# Patient Record
Sex: FEMALE | Race: BLACK OR AFRICAN AMERICAN | Employment: UNEMPLOYED | URBAN - METROPOLITAN AREA
[De-identification: names, ages, dates, MRNs, and addresses within clinical notes are randomized per-mention and may not be internally consistent; named-entity substitution may affect disease eponyms.]

---

## 2018-11-18 ENCOUNTER — APPOINTMENT (OUTPATIENT)
Dept: CT IMAGING | Age: 48
End: 2018-11-18
Payer: COMMERCIAL

## 2018-11-18 ENCOUNTER — HOSPITAL ENCOUNTER (EMERGENCY)
Age: 48
Discharge: HOME | End: 2018-11-18
Attending: EMERGENCY MEDICINE
Payer: COMMERCIAL

## 2018-11-18 ENCOUNTER — APPOINTMENT (OUTPATIENT)
Dept: GENERAL RADIOLOGY | Age: 48
End: 2018-11-18
Payer: COMMERCIAL

## 2018-11-18 VITALS
TEMPERATURE: 98.6 F | HEART RATE: 86 BPM | BODY MASS INDEX: 37.59 KG/M2 | SYSTOLIC BLOOD PRESSURE: 171 MMHG | OXYGEN SATURATION: 96 % | WEIGHT: 240 LBS | DIASTOLIC BLOOD PRESSURE: 91 MMHG | RESPIRATION RATE: 20 BRPM

## 2018-11-18 DIAGNOSIS — S09.90XA HEAD INJURY, CLOSED, WITHOUT LOC, INITIAL ENCOUNTER: ICD-10-CM

## 2018-11-18 DIAGNOSIS — R07.9 CHEST PAIN, UNSPECIFIED TYPE: ICD-10-CM

## 2018-11-18 DIAGNOSIS — R74.01 TRANSAMINITIS: ICD-10-CM
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
## 2018-11-18 DIAGNOSIS — M79.2 RADICULAR PAIN OF LEFT UPPER EXTREMITY: ICD-10-CM

## 2018-11-18 LAB
A/G RATIO: 1.1 (ref 1.1–2.2)
ALBUMIN SERPL-MCNC: 4.1 G/DL (ref 3.4–5)
ALP BLD-CCNC: 69 U/L (ref 40–129)
ALT SERPL-CCNC: 61 U/L (ref 10–40)
ANION GAP SERPL CALCULATED.3IONS-SCNC: 17 MMOL/L (ref 3–16)
AST SERPL-CCNC: 128 U/L (ref 15–37)
BILIRUB SERPL-MCNC: <0.2 MG/DL (ref 0–1)
BUN BLDV-MCNC: 11 MG/DL (ref 7–20)
CALCIUM SERPL-MCNC: 8.7 MG/DL (ref 8.3–10.6)
CHLORIDE BLD-SCNC: 107 MMOL/L (ref 99–110)
CO2: 24 MMOL/L (ref 21–32)
CREAT SERPL-MCNC: 0.6 MG/DL (ref 0.6–1.1)
GFR AFRICAN AMERICAN: >60
GFR NON-AFRICAN AMERICAN: >60
GLOBULIN: 3.6 G/DL
GLUCOSE BLD-MCNC: 112 MG/DL (ref 70–99)
HCG QUALITATIVE: NEGATIVE
HCT VFR BLD CALC: 36 % (ref 36–48)
HEMOGLOBIN: 12.2 G/DL (ref 12–16)
MCH RBC QN AUTO: 30.8 PG (ref 26–34)
MCHC RBC AUTO-ENTMCNC: 33.9 G/DL (ref 31–36)
MCV RBC AUTO: 91 FL (ref 80–100)
PDW BLD-RTO: 22.4 % (ref 12.4–15.4)
PLATELET # BLD: 334 K/UL (ref 135–450)
PMV BLD AUTO: 8.9 FL (ref 5–10.5)
POTASSIUM SERPL-SCNC: 3.5 MMOL/L (ref 3.5–5.1)
RBC # BLD: 3.96 M/UL (ref 4–5.2)
SODIUM BLD-SCNC: 148 MMOL/L (ref 136–145)
TOTAL PROTEIN: 7.7 G/DL (ref 6.4–8.2)
TROPONIN: <0.01 NG/ML
WBC # BLD: 10.5 K/UL (ref 4–11)

## 2018-11-18 PROCEDURE — 71046 X-RAY EXAM CHEST 2 VIEWS: CPT

## 2018-11-18 PROCEDURE — 96374 THER/PROPH/DIAG INJ IV PUSH: CPT

## 2018-11-18 PROCEDURE — 70450 CT HEAD/BRAIN W/O DYE: CPT

## 2018-11-18 PROCEDURE — 72125 CT NECK SPINE W/O DYE: CPT

## 2018-11-18 PROCEDURE — 93010 ELECTROCARDIOGRAM REPORT: CPT | Performed by: INTERNAL MEDICINE

## 2018-11-18 PROCEDURE — 2580000003 HC RX 258: Performed by: EMERGENCY MEDICINE

## 2018-11-18 PROCEDURE — 6360000002 HC RX W HCPCS: Performed by: EMERGENCY MEDICINE

## 2018-11-18 PROCEDURE — 84484 ASSAY OF TROPONIN QUANT: CPT

## 2018-11-18 PROCEDURE — 96375 TX/PRO/DX INJ NEW DRUG ADDON: CPT

## 2018-11-18 PROCEDURE — 99285 EMERGENCY DEPT VISIT HI MDM: CPT

## 2018-11-18 PROCEDURE — 96361 HYDRATE IV INFUSION ADD-ON: CPT

## 2018-11-18 PROCEDURE — 84703 CHORIONIC GONADOTROPIN ASSAY: CPT

## 2018-11-18 PROCEDURE — 85027 COMPLETE CBC AUTOMATED: CPT

## 2018-11-18 PROCEDURE — 93005 ELECTROCARDIOGRAM TRACING: CPT | Performed by: EMERGENCY MEDICINE

## 2018-11-18 PROCEDURE — 80053 COMPREHEN METABOLIC PANEL: CPT

## 2018-11-18 RX ORDER — LIDOCAINE 50 MG/G
1 PATCH TOPICAL DAILY
Qty: 30 PATCH | Refills: 0 | Status: SHIPPED | OUTPATIENT
Start: 2018-11-18

## 2018-11-18 RX ORDER — CYCLOBENZAPRINE HCL 10 MG
10 TABLET ORAL 3 TIMES DAILY PRN
Qty: 30 TABLET | Refills: 0 | Status: SHIPPED | OUTPATIENT
Start: 2018-11-18 | End: 2018-11-28

## 2018-11-18 RX ORDER — 0.9 % SODIUM CHLORIDE 0.9 %
1000 INTRAVENOUS SOLUTION INTRAVENOUS ONCE
Status: COMPLETED | OUTPATIENT
Start: 2018-11-18 | End: 2018-11-18

## 2018-11-18 RX ORDER — DICLOFENAC SODIUM 75 MG/1
75 TABLET, DELAYED RELEASE ORAL 2 TIMES DAILY
Qty: 20 TABLET | Refills: 0 | Status: SHIPPED | OUTPATIENT
Start: 2018-11-18

## 2018-11-18 RX ORDER — KETOROLAC TROMETHAMINE 30 MG/ML
30 INJECTION, SOLUTION INTRAMUSCULAR; INTRAVENOUS ONCE
Status: COMPLETED | OUTPATIENT
Start: 2018-11-18 | End: 2018-11-18

## 2018-11-18 RX ORDER — LORAZEPAM 2 MG/ML
1 INJECTION INTRAMUSCULAR ONCE
Status: COMPLETED | OUTPATIENT
Start: 2018-11-18 | End: 2018-11-18

## 2018-11-18 RX ADMIN — SODIUM CHLORIDE 1000 ML: 9 INJECTION, SOLUTION INTRAVENOUS at 02:45

## 2018-11-18 RX ADMIN — KETOROLAC TROMETHAMINE 30 MG: 30 INJECTION, SOLUTION INTRAMUSCULAR at 03:49

## 2018-11-18 RX ADMIN — LORAZEPAM 1 MG: 2 INJECTION INTRAMUSCULAR; INTRAVENOUS at 03:48

## 2018-11-18 ASSESSMENT — PAIN DESCRIPTION - PAIN TYPE: TYPE: CHRONIC PAIN

## 2018-11-18 ASSESSMENT — PAIN DESCRIPTION - DESCRIPTORS: DESCRIPTORS: ACHING;CONSTANT

## 2018-11-18 ASSESSMENT — PAIN SCALES - GENERAL
PAINLEVEL_OUTOF10: 0
PAINLEVEL_OUTOF10: 8

## 2018-11-18 ASSESSMENT — PAIN DESCRIPTION - ORIENTATION: ORIENTATION: LEFT

## 2018-11-18 ASSESSMENT — PAIN DESCRIPTION - LOCATION: LOCATION: ARM

## 2018-11-18 NOTE — ED PROVIDER NOTES
Recently had [facility]AG      Social History     Social History    Marital status: Single     Spouse name: N/A    Number of children: N/A    Years of education: N/A     Occupational History    Not on file. Social History Main Topics    Smoking status: Current Every Day Smoker     Packs/day: 0.25     Years: 10.00     Types: Cigarettes     Last attempt to quit: 8/1/2009    Smokeless tobacco: Never Used      Comment: H.O. smoking 1 p.p.d x 10 yrs / Quit 8/2009    Alcohol use Yes      Comment: SOCIALLY- WEEKLY    Drug use: No    Sexual activity: Not on file     Other Topics Concern    Not on file     Social History Narrative    No narrative on file     No current facility-administered medications for this encounter. Current Outpatient Prescriptions   Medication Sig Dispense Refill    diclofenac (VOLTAREN) 75 MG EC tablet Take 1 tablet by mouth 2 times daily 20 tablet 0    cyclobenzaprine (FLEXERIL) 10 MG tablet Take 1 tablet by mouth 3 times daily as needed for Muscle spasms 30 tablet 0    lidocaine (LIDODERM) 5 % Place 1 patch onto the skin daily 12 hours on, 12 hours off. 30 patch 0    losartan (COZAAR) 50 MG tablet Take 1 tablet by mouth daily 30 tablet 3    metoprolol succinate (TOPROL XL) 200 MG extended release tablet Take 1 tablet by mouth daily 30 tablet 3    amLODIPine (NORVASC) 10 MG tablet Take 1 tablet by mouth daily 30 tablet 3    diclofenac sodium 1 % GEL Apply 2 g topically 4 times daily as needed for Pain (and swelling) 1 Tube 3    albuterol (PROVENTIL HFA) 108 (90 BASE) MCG/ACT inhaler Inhale 2 puffs into the lungs every 6 hours as needed for Wheezing 1 Inhaler 3    ibuprofen (IBU) 600 MG tablet Take 1 tablet by mouth every 8 hours as needed for Pain 90 tablet 3    mometasone (NASONEX) 50 MCG/ACT nasal spray USE 2 SPRAYS BY NASAL ROUTE DAILY 17 g 3    multivitamin (ANTIOXIDANT;PROSIGHT) TABS per tablet Take 1 tablet by mouth daily.         Elastic Bandages & Supports

## 2018-11-18 NOTE — ED NOTES
Patient walked out of room stating \"my chest hurts\". MD made aware and orders received.       Adelaida Landers RN  11/18/18 0251

## 2018-11-20 LAB
EKG ATRIAL RATE: 105 BPM
EKG DIAGNOSIS: NORMAL
EKG P AXIS: 43 DEGREES
EKG P-R INTERVAL: 144 MS
EKG Q-T INTERVAL: 332 MS
EKG QRS DURATION: 82 MS
EKG QTC CALCULATION (BAZETT): 438 MS
EKG R AXIS: 23 DEGREES
EKG T AXIS: 16 DEGREES
EKG VENTRICULAR RATE: 105 BPM

## 2020-07-25 ENCOUNTER — HOSPITAL ENCOUNTER (EMERGENCY)
Facility: CLINIC | Age: 50
Discharge: HOME | End: 2020-07-26
Attending: EMERGENCY MEDICINE | Admitting: EMERGENCY MEDICINE

## 2020-07-25 ENCOUNTER — APPOINTMENT (OUTPATIENT)
Dept: CT IMAGING | Facility: CLINIC | Age: 50
End: 2020-07-25
Attending: EMERGENCY MEDICINE

## 2020-07-25 DIAGNOSIS — S01.81XA FACIAL LACERATION, INITIAL ENCOUNTER: ICD-10-CM

## 2020-07-25 DIAGNOSIS — R55 SYNCOPE AND COLLAPSE: ICD-10-CM

## 2020-07-25 DIAGNOSIS — S16.1XXA STRAIN OF NECK MUSCLE, INITIAL ENCOUNTER: ICD-10-CM

## 2020-07-25 DIAGNOSIS — F10.920 ALCOHOLIC INTOXICATION WITHOUT COMPLICATION (H): ICD-10-CM

## 2020-07-25 LAB
ANION GAP SERPL CALCULATED.3IONS-SCNC: 10 MMOL/L (ref 3–14)
BASOPHILS # BLD AUTO: 0 10E9/L (ref 0–0.2)
BASOPHILS NFR BLD AUTO: 0.9 %
BUN SERPL-MCNC: 23 MG/DL (ref 7–30)
CALCIUM SERPL-MCNC: 8.5 MG/DL (ref 8.5–10.1)
CHLORIDE SERPL-SCNC: 113 MMOL/L (ref 94–109)
CO2 SERPL-SCNC: 22 MMOL/L (ref 20–32)
CREAT SERPL-MCNC: 0.79 MG/DL (ref 0.52–1.04)
DIFFERENTIAL METHOD BLD: ABNORMAL
EOSINOPHIL # BLD AUTO: 0.1 10E9/L (ref 0–0.7)
EOSINOPHIL NFR BLD AUTO: 1.1 %
ERYTHROCYTE [DISTWIDTH] IN BLOOD BY AUTOMATED COUNT: 19.6 % (ref 10–15)
GFR SERPL CREATININE-BSD FRML MDRD: 88 ML/MIN/{1.73_M2}
GLUCOSE SERPL-MCNC: 87 MG/DL (ref 70–99)
HCT VFR BLD AUTO: 39.4 % (ref 35–47)
HGB BLD-MCNC: 13.2 G/DL (ref 11.7–15.7)
IMM GRANULOCYTES # BLD: 0 10E9/L (ref 0–0.4)
IMM GRANULOCYTES NFR BLD: 0.4 %
LYMPHOCYTES # BLD AUTO: 1.4 10E9/L (ref 0.8–5.3)
LYMPHOCYTES NFR BLD AUTO: 29.2 %
MCH RBC QN AUTO: 30.6 PG (ref 26.5–33)
MCHC RBC AUTO-ENTMCNC: 33.5 G/DL (ref 31.5–36.5)
MCV RBC AUTO: 91 FL (ref 78–100)
MONOCYTES # BLD AUTO: 0.5 10E9/L (ref 0–1.3)
MONOCYTES NFR BLD AUTO: 10.4 %
NEUTROPHILS # BLD AUTO: 2.7 10E9/L (ref 1.6–8.3)
NEUTROPHILS NFR BLD AUTO: 58 %
NRBC # BLD AUTO: 0 10*3/UL
NRBC BLD AUTO-RTO: 0 /100
PLATELET # BLD AUTO: 378 10E9/L (ref 150–450)
POTASSIUM SERPL-SCNC: 3.6 MMOL/L (ref 3.4–5.3)
RBC # BLD AUTO: 4.32 10E12/L (ref 3.8–5.2)
SODIUM SERPL-SCNC: 145 MMOL/L (ref 133–144)
TROPONIN I SERPL-MCNC: <0.015 UG/L (ref 0–0.04)
WBC # BLD AUTO: 4.6 10E9/L (ref 4–11)

## 2020-07-25 PROCEDURE — 80048 BASIC METABOLIC PNL TOTAL CA: CPT | Performed by: EMERGENCY MEDICINE

## 2020-07-25 PROCEDURE — 12052 INTMD RPR FACE/MM 2.6-5.0 CM: CPT

## 2020-07-25 PROCEDURE — 25000125 ZZHC RX 250

## 2020-07-25 PROCEDURE — 70450 CT HEAD/BRAIN W/O DYE: CPT

## 2020-07-25 PROCEDURE — 93005 ELECTROCARDIOGRAM TRACING: CPT

## 2020-07-25 PROCEDURE — 80320 DRUG SCREEN QUANTALCOHOLS: CPT | Performed by: EMERGENCY MEDICINE

## 2020-07-25 PROCEDURE — 84484 ASSAY OF TROPONIN QUANT: CPT | Performed by: EMERGENCY MEDICINE

## 2020-07-25 PROCEDURE — 72125 CT NECK SPINE W/O DYE: CPT

## 2020-07-25 PROCEDURE — 99285 EMERGENCY DEPT VISIT HI MDM: CPT | Mod: 25

## 2020-07-25 PROCEDURE — 85025 COMPLETE CBC W/AUTO DIFF WBC: CPT | Performed by: EMERGENCY MEDICINE

## 2020-07-25 RX ORDER — BUPIVACAINE HYDROCHLORIDE 5 MG/ML
INJECTION, SOLUTION PERINEURAL
Status: COMPLETED
Start: 2020-07-25 | End: 2020-07-25

## 2020-07-25 RX ORDER — LIDOCAINE HYDROCHLORIDE AND EPINEPHRINE 10; 10 MG/ML; UG/ML
INJECTION, SOLUTION INFILTRATION; PERINEURAL
Status: COMPLETED
Start: 2020-07-25 | End: 2020-07-25

## 2020-07-25 RX ADMIN — LIDOCAINE HYDROCHLORIDE AND EPINEPHRINE 2 ML: 10; 10 INJECTION, SOLUTION INFILTRATION; PERINEURAL at 22:22

## 2020-07-25 RX ADMIN — BUPIVACAINE HYDROCHLORIDE 5 ML: 5 INJECTION, SOLUTION PERINEURAL at 22:05

## 2020-07-25 ASSESSMENT — ENCOUNTER SYMPTOMS
HEADACHES: 1
MYALGIAS: 1

## 2020-07-25 NOTE — ED AVS SNAPSHOT
[facility] Emergency Department  [address] [facility] [address]  Phone:  [phone]  Fax:  [phone]                                    Jose Corral   MRN: [id number]    Department:  [facility] Emergency Department   Date of Visit:  7/25/2020           After Visit Summary Signature Page    I have received my discharge instructions, and my questions have been answered. I have discussed any challenges I see with this plan with the nurse or doctor.    ..........................................................................................................................................  Patient/Patient Representative Signature      ..........................................................................................................................................  Patient Representative Print Name and Relationship to Patient    ..................................................               ................................................  Date                                   Time    ..........................................................................................................................................  Reviewed by Signature/Title    ...................................................              ..............................................  Date                                               Time          22EPIC Rev 08/18

## 2020-07-26 VITALS
RESPIRATION RATE: 18 BRPM | SYSTOLIC BLOOD PRESSURE: 145 MMHG | TEMPERATURE: 98.5 F | OXYGEN SATURATION: 100 % | HEART RATE: 81 BPM | DIASTOLIC BLOOD PRESSURE: 89 MMHG

## 2020-07-26 LAB
ETHANOL SERPL-MCNC: 0.41 G/DL
INTERPRETATION ECG - MUSE: NORMAL

## 2020-07-26 NOTE — ED TRIAGE NOTES
"Pt reportedly had a witnessed fall while going downstairs. Per EMS, pt's brother stated he thought she \"blacked out\" and that is what caused her to fall. Pt fell down 3 stairs and landed on tile on her face . Pt placed in c-collar by EMS, not complaining of neck pain upon arrival to ED. Pt is intoxicated. Pt's upper R. Lip is lacerated and bleeding.   "

## 2020-07-26 NOTE — ED NOTES
Bed: ED04  Expected date: 7/25/20  Expected time: 9:35 PM  Means of arrival: Ambulance  Comments:  [id number]

## 2020-07-26 NOTE — ED NOTES
Pt awake now and ambulated to the bathroom. Pt remains confused but is now more coherent, MD notified

## 2020-07-26 NOTE — ED NOTES
Contacted sister-in-law, Saundra @ [phone], and updated of the POC. Pt will remain being monitored at the moment. Will contact family again for updates

## 2020-07-26 NOTE — ED NOTES
Pt became apneic and SpO2 on the monitor was down to 54% on RA. RN rouse pt up immediately, pt woke up and was able to take deep breaths. RN applied 4L of O2 via NC and pt's SpO2 increased upto 100%. Pt encouraged to continue deep breaths. Pt remains lethargic but appropriate. Continues to be confused but pleasant. MD notified and will continue to monitor pt.

## 2020-07-26 NOTE — ED PROVIDER NOTES
History     Chief Complaint:  Fall      HPI history is limited due to patient intoxication  Jose Corral is a 49 year old female who presents via EMS for evaluation of a fall. Patient's brother states that the patient had a syncopal episode on the stairs and fell down three steps and landing on her face. Patient currently complains of headache and generalized body aches. Patient does endorse drinking gin tonight but states she only had one shot. EMS placed the patient in a c-collar PTA.     Allergies:  No known drug allergies    Medications:    The patient is not currently taking any prescribed medications.    Past Medical History:    The patient does not have any past pertinent medical history.    Past Surgical History:    The patient does not have any past pertinent medical history.    Family History:    History reviewed. No pertinent family history.     Social History:  The patient presents to the emergency department via EMS.  PCP: No primary care provider on file.    Review of Systems   Musculoskeletal: Positive for myalgias.   Neurological: Positive for syncope and headaches.   All other systems reviewed and are negative.    Physical Exam     Patient Vitals for the past 24 hrs:   BP Temp Temp src Pulse Heart Rate Resp SpO2   07/25/20 2148 (!) 182/109 98.5  F (36.9  C) Oral 90 90 18 98 %     Physical Exam  General: The patient is alert, in no respiratory distress. In C-collar.    HENT: Mucous membranes moist.    Cardiovascular: Regular rate and rhythm. Good pulses in all four extremities. Normal capillary refill and skin turgor.     Respiratory: Lungs are clear. No nasal flaring. No retractions. No wheezing, no crackles.    Gastrointestinal: Abdomen soft. No guarding, no rebound. No palpable hernias.     Musculoskeletal: No gross deformity.     Skin: No rashes or petechiae. Right upper lip 3 cm laceration near the [place] border.    Neurologic: The patient is alert and oriented x3. GCS 15. No testable  cranial nerve deficit. Follows commands with clear and appropriate speech. Gives appropriate answers. Good strength in all extremities. No gross neurologic deficit. Gross sensation intact. Pupils are round and reactive. No meningismus.     Lymphatic: No cervical adenopathy. No lower extremity swelling.    Psychiatric: The patient is non-tearful.      Emergency Department Course   ECG:  Normal sinus rhythm moderate LVH nonspecific ST segment ventricular 84 PA interval 154 QRS of 88  axis is within normal limits    Imaging:  CT Head without contrast:   Small right parietal scalp hematoma no intracranial hemorrhage    CT Cervical spine w/o contrast   No CT evidence for fracture or subluxation      Laboratory:  CBC: WBC: 4.6, HGB: 13.2, PLT: 378  BMP: Glucose 87, Sodium 145 (H), Chloride 113 (H), o/w WNL (Creatinine: 0.79)  2237 Troponin: <0.015  Alcohol ethyl: Pending    Procedures:  Complex lip laceration repair and infraorbital nerve block on the right  Location right upper lip  Through the vermilion border  3 cm in length  Anesthesia was with an infraorbital nerve block performed with 3 mL's of bupivacaine no epi there were no complications it was performed in the usual technique  The area was cleaned and explored.  I used 1 deep interrupted 4-0 Vicryl suture to approximate the edges and then used one 5-0 Vicryl repeated to approximate the Prolene border successfully.  I then placed 2 more Vicryl repeat sutures and four 5-0 chromic sutures to approximate the lip there were no complications          Emergency Department Course:  Nursing notes and vitals reviewed. (6627) I performed an exam of the patient as documented above.     Blood drawn. This was sent to the lab for further testing, results above.     The patient was sent for a CT while in the emergency department, findings above.     The patient's lip was sutured emergently due to bleeding the bleeding was quite heavy.  The patient had cleaning  exploration and hemostasis prior to going to CT    I did sign the patient out to Dr. Mack the coming physician regarding these results.    Impression & Plan    Medical Decision Making:  The patient had a brief episode of loss of consciousness admitted to only 1 cup of alcohol but her brother who came with her was there at the time said she had a lot more than that.  Her breath did smell of alcohol and I was suspicious that she was intoxicated.  The alcohol level is pending the patient had a significant lip laceration that was closed emergently due to bleeding I did however have time to clean and explore it and close it well.  She was sent for a head CT and CT scan of her C-spine.  Her laboratory studies that came back including her EKG looked good and I do not feel is likely was a syncopal episode but it is possible.  Here she is appropriate with no neurologic deficit outside of some mild intoxication.  I did center regarding results and the results were still pending but anticipate that she will go home.    Diagnosis:  1. Syncope and collapse    2. Alcoholic intoxication without complication (H)    3. Facial laceration, initial encounter    4. Strain of neck muscle, initial encounter          Disposition:  Patient signed out    Discharge Medications:  New Prescriptions    No medications on file       Francis Espinosa  7/25/2020   [facility] EMERGENCY DEPARTMENT  Scribe Disclosure:  I, Francis Espinosa, am serving as a scribe at 9:48 PM on 7/25/2020 to document services personally performed by Bunny Villarreal MD based on my observations and the provider's statements to me.        Bunny Villarreal MD  07/26/20 4014

## 2020-07-26 NOTE — ED NOTES
Contacted brother, Emre @ [phone] and updated pt on POC. Brother is now on his way to pick pt up. Pt understands POC.

## 2020-07-26 NOTE — ED NOTES
CT follow up:    CT Head w/o Contrast (Final result)   Result time 07/26/20 00:02:46   Final result by Nathan Dodson MD (07/26/20 00:02:46)                 Impression:     IMPRESSION:   HEAD CT:   1. Small right parietal scalp hematoma. No acute intracranial hemorrhage or calvarial fracture.     CERVICAL SPINE CT:   1.  No CT evidence for acute fracture or post traumatic subluxation.               Family willing to come provide transport home once medically cleared.  EtOH level pending, expect it to be elevated.  She is maintaining her airway and awakens to voice.  Anticipate discharge later this am.          Rahat Mack MD  07/26/20 0015

## 2020-07-26 NOTE — ED NOTES
Spoke with Pt's sister-in-law, Saundra. She is going to come pickup the pt when she is ready for discharge. # is [phone]

## 2020-07-26 NOTE — DISCHARGE INSTRUCTIONS
Discharge Instructions  Laceration (Cut)    You were seen today for a laceration (cut).  Your provider examined your laceration for any problems such a buried foreign body (like glass, a splinter, or gravel), or injury to blood vessels, tendons, and nerves.  Your provider may have also rinsed and/or scrubbed your laceration to help prevent an infection. It may not be possible to find all problems with your laceration on the first visit; occasionally foreign bodies or a tendon injury can go undetected.    Your laceration may have been closed in one of several ways:  No closure: many wounds will heal just fine without closure.  Stitches: regular stitches that require removal.  Staples: skin staples are often used in the scalp/head.  Wound adhesive (glue): skin glue can be used for certain lacerations and doesn t require removal.  Wound strips (aka Butterfly bandages or steri-strips): these are bandages that help to close a wound.  Absorbable stitches:  dissolving  stitches that go away on their own and usually don t require removal.    A small percentage of wounds will develop an infection regardless of how well the wound is cared for. Antibiotics are generally not indicated to prevent an infection so are only given for a small number of high-risk wounds. Some lacerations are too high risk to close, and are left open to heal because closure can increase the likelihood that an infection will develop.    Remember that all lacerations, no matter how expertly repaired, will cause scarring. We consider many factors, techniques, and materials, in our efforts to provide the best possible cosmetic outcome.    Generally, every Emergency Department visit should have a follow-up clinic visit with either a primary or a specialty clinic/provider. Please follow-up as instructed by your emergency provider today.     Return to the Emergency Department right away if:  You have more redness, swelling, pain, drainage (pus), a bad smell,  or red streaking from your laceration as these symptoms could indicate an infection.  You have a fever of 100.4 F or more.  You have bleeding that you cannot stop at home. If your cut starts to bleed, hold pressure on the bleeding area with a clean cloth or put pressure over the bandage.  If the bleeding does not stop after using constant pressure for 30 minutes, you should return to the Emergency Department for further treatment.  An area past the laceration is cool, pale, or blue compared with the other side, or has a slower return of color when squeezed.  Your dressing seems too tight or starts to get uncomfortable or painful. For children, signs of a problem might be irritability or restlessness.  You have loss of normal function or use of an area, such as being unable to straighten or bend a finger normally.  You have a numb area past the laceration.    Return to the Emergency Department or see your regular provider if:  The laceration starts to come open.   You have something coming out of the cut or a feeling that there is something in the laceration.  Your wound will not heal, or keeps breaking open. There can always be glass, wood, dirt or other things in any wound.  They will not always show up, even on x-rays.  If a wound does not heal, this may be why, and it is important to follow-up with your regular provider.    Home Care:  Take your dressing off in 12-24 hours, or as instructed by your provider, to check your laceration. Remove the dressing sooner if it seems too tight or painful, or if it is getting numb, tingly, or pale past the dressing.  Gently wash your laceration 1-2 times daily with clean water and mild soap. It is okay to shower or run clean water over the laceration, but do not let the laceration soak in water (no swimming).  If your laceration was closed with wound adhesive or strips: pat it dry and leave it open to the air. For all other repairs: after you wash your laceration, or at least  2 times a day, apply antibiotic ointment (such as Neosporin  or Bacitracin ) to the laceration, then cover it with a Band-Aid  or gauze.  Keep the laceration clean. Wear gloves or other protective clothing if you are around dirt.    Follow-up for removal:  If your wound was closed with staples or regular stitches, they need to be removed according to the instructions and timeline specified by your provider today.  If your wound was closed with absorbable ( dissolving ) sutures, they should fall out, dissolve, or not be visible in about one week. If they are still visible, then they should be removed according to the instructions and timeline specified by your provider today.    Scars:  To help minimize scarring:  Wear sunscreen over the healed laceration when out in the sun.  Massage the area regularly once healed.  You may apply Vitamin E to the healed wound.  Wait. Scars improve in appearance over months and years.    If you were given a prescription for medicine here today, be sure to read all of the information (including the package insert) that comes with your prescription.  This will include important information about the medicine, its side effects, and any warnings that you need to know about.  The pharmacist who fills the prescription can provide more information and answer questions you may have about the medicine.  If you have questions or concerns that the pharmacist cannot address, please call or return to the Emergency Department.       Remember that you can always come back to the Emergency Department if you are not able to see your regular provider in the amount of time listed above, if you get any new symptoms, or if there is anything that worries you.  ~~~~~~~~~~~~~~~~~~~~~~~~~~~~~~~    Discharge Instructions  Trauma    You were seen today for an injury due to some kind of trauma (crash, fall, etc.).  Some injuries may not show up until after you leave the Emergency Department.  It is important  that you pay attention to these instructions and follow-up with your regular doctor as instructed.    Return to the Emergency Department right away if:  You have abdominal pain or bruises, chest pain, pain in a new area, or pain that is getting worse.  You get short of breath.  You develop a fever over 101 degrees.  You have weakness in your arms or legs.  You faint or you are very lightheaded.  You have any new symptoms, you are feeling weak or unusually ill, or something worries you.   Injuries to the brain are possible with any accident.  Return right away if you have confusion, vomiting more than once, difficulty walking or a headache that is getting worse. Bring a child or a person who can t talk back if they seem to be behaving in an abnormal way.      MORE INFORMATION:    General Injuries:  Aches and pains are usually worse the day after your accident, but should not be severe, and should start getting better after that. Aches and pains are common in the neck and back.  Injuries from your accident may prevent you from working.  Follow-up with your regular doctor to get a work note and to find out how long you will not be able to work.  Pain medications or your injuries may make it unsafe for you to drive or operate machinery.  Use ice to injured areas for the first one or two days. Apply a bag of ice wrapped in a cloth for about 15 minutes at a time. You can do this as often as once an hour. Do not sleep with an ice pack, since it can burn you.   You can use non-prescription pain medicine, like Tylenol  (acetaminophen), Advil  (ibuprofen), Motrin  (ibuprofen), Nuprin  (ibuprofen) if your emergency doctor or your own doctor told you this is okay. Tylenol  (acetaminophen) is in many prescription medicines and non-prescription medicines--check all of your medicines to be sure you aren t taking more than 3000 mg per day.  Limit your activity for at least one or two days. Avoid doing things that hurt.  You need to  see your doctor if any injured area is not back to normal in 1 week.    Car Accident:  If you have been on a backboard or had a neck collar on, this may make you stiff and sore.  This should get better in 1-2 days.  Return to the Emergency Department if the pain or discomfort is severe or gets worse.  Be careful of shards of glass on your body or in your belongings.    Fractures, Sprains, and Strains:  Return to the Emergency Department right away if your injured area gets more painful, if the splint or dressing seems to be too tight, if it gets numb or tingly past the injury, or if the area past the injury gets pale, blue, or cold.  Use your crutches if you were given them today. Don t put weight on the injured area until the pain is gone.  Keep the injured area above the level of your heart while laying or sitting down.  This well help lessen the swelling (puffiness) and the pain.  You may use an elastic bandage (Ace  Wrap) if it makes you more comfortable. Wrap it just tight enough to provide mild compression, and loosen it if you get swelling past the bandage.    Note about X-rays: If you had x-rays done today, they were read by your emergency physician. They will also be read later by a radiologist. We will contact you if the radiologist thinks they show something different than the emergency physician did. Remember that there are some fractures (breaks in the bone) that can t be seen right away. Even if your x-rays today were normal, you must see your doctor in clinic to re-check.     Splints:  A splint put on in the Emergency Department is temporary. Your regular doctor or orthopedic doctor will remove it, and replace it with a cast or boot if needed.  Keep the splint dry. Cover it with a plastic bag when you wash. Even with a plastic bag, you still can t get in water or let water get right on it. If it does get wet, you should come back or see your doctor to have it replaced.  Do not put objects inside the  splint to scratch.  If there is an elastic bandage (Ace  Wrap) holding the splint on this may be loosened a little to relieve pressure or pain.  If pain continues return to the Emergency Department right away.  Return if the splint starts cutting into your skin.  Do not remove your splint by yourself unless told to by your doctor. You can t take it off and put it back on again.     Wounds:  Infections can follow many injuries. Watch for fevers, redness spreading from the wound, pus or stitches that open up. Return here or see your doctor if these happen.  There can always be glass, wood, dirt or other things in any wound.  They won t always show up even on x-rays.  If a wound doesn t heal, this may be why, and it is important to follow-up with your regular doctor. Small pieces of glass or other materials may work their way out on their own.  Cuts or scrapes may start to bleed after leaving the Emergency Department.  If this happens, hold pressure on the bleeding area with a clean cloth or put pressure over the bandage.  If the bleeding doesn t stop after you use constant pressure for   hour, you should return to the Emergency Department for further treatment.  Any bandage or dressing put on here should be removed in 12-24 hours, or as your doctor instructs. Remove the dressing sooner if it seems too tight or painful, or if it is getting numb, tingly, or pale past the dressing.  After you take off the dressing, wash the cut or scrape with soap and water once or twice a day.  Apply ointment like Bacitracin  (polypeptide antibiotic) to scrapes or cuts, and keep them covered with a Band-Aid  or gauze if possible, until they heal up or until your stitches are taken out.  Dermabond  or Steri-Strips  should be left alone and will come off by themselves.  Dissolving stitches should go away or fall out within about a week.  Regular stitches need to be taken out by your doctor in clinic.  Call today and schedule an  appointment.  Leave your stitches in for as long as you were told today.    Most injuries are preventable!  As your local emergency physicians, we encourage you to:  Wear your seat belt.  Do not talk on your cell phone while driving.  Do not read or send text messages while driving.  Wear a bike or motorcycle helmet.  Wear a helmet while skiing and snowboarding.  Wear personal flotation devices at all times while on the water.  Always have your child in a car seat.  Do not allow children less than 12 years old to ride in the front seat.  Go to the CDC website to find more information on preventing injures:  http://www.cdc.gov/injury/index.html    If you were given a prescription for medicine here today, be sure to read all of the information (including the package insert) that comes with your prescription.  This will include important information about the medicine, its side effects, and any warnings that you need to know about.  The pharmacist who fills the prescription can provide more information and answer questions you may have about the medicine.  If you have questions or concerns that the pharmacist cannot address, please call or return to the Emergency Department.     Opioid Medication Information    Pain medications are among the most commonly prescribed medicines, so we are including this information for all our patients. If you did not receive pain medication or get a prescription for pain medicine, you can ignore it.     You may have been given a prescription for an opioid (narcotic) pain medicine and/or have received a pain medicine while here in the Emergency Department. These medicines can make you drowsy or impaired. You must not drive, operate dangerous equipment, or engage in any other dangerous activities while taking these medications. If you drive while taking these medications, you could be arrested for DUI, or driving under the influence. Do not drink any alcohol while you are taking these  medications.     Opioid pain medications can cause addiction. If you have a history of chemical dependency of any type, you are at a higher risk of becoming addicted to pain medications.  Only take these prescribed medications to treat your pain when all other options have been tried. Take it for as short a time and as few doses as possible. Store your pain pills in a secure place, as they are frequently stolen and provide a dangerous opportunity for children or visitors in your house to start abusing these powerful medications. We will not replace any lost or stolen medicine.  As soon as your pain is better, you should flush all your remaining medication.     Many prescription pain medications contain Tylenol  (acetaminophen), including Vicodin , Tylenol #3 , Norco , Lortab , and Percocet .  You should not take any extra pills of Tylenol  if you are using these prescription medications or you can get very sick.  Do not ever take more than 3000 mg of acetaminophen in any 24 hour period.    All opioids tend to cause constipation. Drink plenty of water and eat foods that have a lot of fiber, such as fruits, vegetables, prune juice, apple juice and high fiber cereal.  Take a laxative if you don t move your bowels at least every other day. Miralax , Milk of Magnesia, Colace , or Senna  can be used to keep you regular.      Remember that you can always come back to the Emergency Department if you are not able to see your regular doctor in the amount of time listed above, if you get any new symptoms, or if there is anything that worries you.

## 2020-10-16 ENCOUNTER — HOSPITAL ENCOUNTER (EMERGENCY)
Facility: CLINIC | Age: 50
Discharge: HOME | End: 2020-10-16
Attending: EMERGENCY MEDICINE | Admitting: EMERGENCY MEDICINE

## 2020-10-16 ENCOUNTER — APPOINTMENT (OUTPATIENT)
Dept: GENERAL RADIOLOGY | Facility: CLINIC | Age: 50
End: 2020-10-16
Attending: EMERGENCY MEDICINE

## 2020-10-16 VITALS
DIASTOLIC BLOOD PRESSURE: 134 MMHG | HEART RATE: 74 BPM | SYSTOLIC BLOOD PRESSURE: 193 MMHG | RESPIRATION RATE: 48 BRPM | WEIGHT: 206 LBS | OXYGEN SATURATION: 100 % | TEMPERATURE: 98.5 F | HEIGHT: 65 IN | BODY MASS INDEX: 34.32 KG/M2

## 2020-10-16 DIAGNOSIS — E87.6 HYPOKALEMIA: ICD-10-CM

## 2020-10-16 DIAGNOSIS — I15.9 SECONDARY HYPERTENSION: ICD-10-CM

## 2020-10-16 LAB
ALBUMIN SERPL-MCNC: 3.3 G/DL (ref 3.4–5)
ALP SERPL-CCNC: 97 U/L (ref 40–150)
ALT SERPL W P-5'-P-CCNC: 31 U/L (ref 0–50)
ANION GAP SERPL CALCULATED.3IONS-SCNC: 11 MMOL/L (ref 3–14)
AST SERPL W P-5'-P-CCNC: 42 U/L (ref 0–45)
BASOPHILS # BLD AUTO: 0 10E9/L (ref 0–0.2)
BASOPHILS NFR BLD AUTO: 0.3 %
BILIRUB SERPL-MCNC: 0.5 MG/DL (ref 0.2–1.3)
BUN SERPL-MCNC: 9 MG/DL (ref 7–30)
CALCIUM SERPL-MCNC: 8.8 MG/DL (ref 8.5–10.1)
CHLORIDE SERPL-SCNC: 97 MMOL/L (ref 94–109)
CO2 SERPL-SCNC: 29 MMOL/L (ref 20–32)
CREAT SERPL-MCNC: 0.59 MG/DL (ref 0.52–1.04)
DIFFERENTIAL METHOD BLD: ABNORMAL
EOSINOPHIL # BLD AUTO: 0.1 10E9/L (ref 0–0.7)
EOSINOPHIL NFR BLD AUTO: 0.7 %
ERYTHROCYTE [DISTWIDTH] IN BLOOD BY AUTOMATED COUNT: 20.3 % (ref 10–15)
GFR SERPL CREATININE-BSD FRML MDRD: >90 ML/MIN/{1.73_M2}
GLUCOSE SERPL-MCNC: 135 MG/DL (ref 70–99)
HCT VFR BLD AUTO: 36.7 % (ref 35–47)
HGB BLD-MCNC: 12.6 G/DL (ref 11.7–15.7)
IMM GRANULOCYTES # BLD: 0 10E9/L (ref 0–0.4)
IMM GRANULOCYTES NFR BLD: 0.4 %
INTERPRETATION ECG - MUSE: NORMAL
LACTATE BLD-SCNC: 1.6 MMOL/L (ref 0.7–2)
LYMPHOCYTES # BLD AUTO: 1.3 10E9/L (ref 0.8–5.3)
LYMPHOCYTES NFR BLD AUTO: 17.2 %
MCH RBC QN AUTO: 31 PG (ref 26.5–33)
MCHC RBC AUTO-ENTMCNC: 34.3 G/DL (ref 31.5–36.5)
MCV RBC AUTO: 90 FL (ref 78–100)
MONOCYTES # BLD AUTO: 0.9 10E9/L (ref 0–1.3)
MONOCYTES NFR BLD AUTO: 12.9 %
NEUTROPHILS # BLD AUTO: 5 10E9/L (ref 1.6–8.3)
NEUTROPHILS NFR BLD AUTO: 68.5 %
NRBC # BLD AUTO: 0 10*3/UL
NRBC BLD AUTO-RTO: 0 /100
PLATELET # BLD AUTO: 208 10E9/L (ref 150–450)
POTASSIUM SERPL-SCNC: 2.8 MMOL/L (ref 3.4–5.3)
PROT SERPL-MCNC: 8.6 G/DL (ref 6.8–8.8)
RBC # BLD AUTO: 4.06 10E12/L (ref 3.8–5.2)
SODIUM SERPL-SCNC: 137 MMOL/L (ref 133–144)
TROPONIN I SERPL-MCNC: <0.015 UG/L (ref 0–0.04)
WBC # BLD AUTO: 7.3 10E9/L (ref 4–11)

## 2020-10-16 PROCEDURE — 87040 BLOOD CULTURE FOR BACTERIA: CPT | Performed by: EMERGENCY MEDICINE

## 2020-10-16 PROCEDURE — 71045 X-RAY EXAM CHEST 1 VIEW: CPT

## 2020-10-16 PROCEDURE — 96375 TX/PRO/DX INJ NEW DRUG ADDON: CPT

## 2020-10-16 PROCEDURE — 83605 ASSAY OF LACTIC ACID: CPT | Performed by: EMERGENCY MEDICINE

## 2020-10-16 PROCEDURE — 250N000013 HC RX MED GY IP 250 OP 250 PS 637: Performed by: EMERGENCY MEDICINE

## 2020-10-16 PROCEDURE — 80053 COMPREHEN METABOLIC PANEL: CPT | Performed by: EMERGENCY MEDICINE

## 2020-10-16 PROCEDURE — 96365 THER/PROPH/DIAG IV INF INIT: CPT

## 2020-10-16 PROCEDURE — 99285 EMERGENCY DEPT VISIT HI MDM: CPT | Mod: 25

## 2020-10-16 PROCEDURE — 258N000003 HC RX IP 258 OP 636: Performed by: EMERGENCY MEDICINE

## 2020-10-16 PROCEDURE — 96361 HYDRATE IV INFUSION ADD-ON: CPT

## 2020-10-16 PROCEDURE — 94640 AIRWAY INHALATION TREATMENT: CPT

## 2020-10-16 PROCEDURE — 85025 COMPLETE CBC W/AUTO DIFF WBC: CPT | Performed by: EMERGENCY MEDICINE

## 2020-10-16 PROCEDURE — 36415 COLL VENOUS BLD VENIPUNCTURE: CPT | Performed by: EMERGENCY MEDICINE

## 2020-10-16 PROCEDURE — 250N000011 HC RX IP 250 OP 636: Performed by: EMERGENCY MEDICINE

## 2020-10-16 PROCEDURE — 93005 ELECTROCARDIOGRAM TRACING: CPT

## 2020-10-16 PROCEDURE — 84484 ASSAY OF TROPONIN QUANT: CPT | Performed by: EMERGENCY MEDICINE

## 2020-10-16 PROCEDURE — 250N000009 HC RX 250: Performed by: EMERGENCY MEDICINE

## 2020-10-16 RX ORDER — POTASSIUM CHLORIDE 1500 MG/1
20 TABLET, EXTENDED RELEASE ORAL 2 TIMES DAILY
Qty: 28 TABLET | Refills: 0 | Status: SHIPPED | OUTPATIENT
Start: 2020-10-16 | End: 2020-10-16

## 2020-10-16 RX ORDER — POTASSIUM CHLORIDE 1500 MG/1
20 TABLET, EXTENDED RELEASE ORAL 2 TIMES DAILY
Qty: 28 TABLET | Refills: 0 | Status: SHIPPED | OUTPATIENT
Start: 2020-10-16 | End: 2020-10-30

## 2020-10-16 RX ORDER — POTASSIUM CHLORIDE 1500 MG/1
40 TABLET, EXTENDED RELEASE ORAL ONCE
Status: COMPLETED | OUTPATIENT
Start: 2020-10-16 | End: 2020-10-16

## 2020-10-16 RX ORDER — METHYLPREDNISOLONE SODIUM SUCCINATE 125 MG/2ML
125 INJECTION, POWDER, LYOPHILIZED, FOR SOLUTION INTRAMUSCULAR; INTRAVENOUS ONCE
Status: COMPLETED | OUTPATIENT
Start: 2020-10-16 | End: 2020-10-16

## 2020-10-16 RX ORDER — LORAZEPAM 2 MG/ML
1 INJECTION INTRAMUSCULAR ONCE
Status: COMPLETED | OUTPATIENT
Start: 2020-10-16 | End: 2020-10-16

## 2020-10-16 RX ORDER — LISINOPRIL 20 MG/1
20 TABLET ORAL DAILY
Qty: 21 TABLET | Refills: 0 | Status: SHIPPED | OUTPATIENT
Start: 2020-10-16 | End: 2021-04-27

## 2020-10-16 RX ORDER — LISINOPRIL 20 MG/1
20 TABLET ORAL DAILY
Qty: 21 TABLET | Refills: 0 | Status: SHIPPED | OUTPATIENT
Start: 2020-10-16 | End: 2020-10-16

## 2020-10-16 RX ORDER — POTASSIUM CHLORIDE 7.45 MG/ML
10 INJECTION INTRAVENOUS
Status: DISCONTINUED | OUTPATIENT
Start: 2020-10-16 | End: 2020-10-16 | Stop reason: HOSPADM

## 2020-10-16 RX ORDER — METOPROLOL TARTRATE 1 MG/ML
5 INJECTION, SOLUTION INTRAVENOUS ONCE
Status: COMPLETED | OUTPATIENT
Start: 2020-10-16 | End: 2020-10-16

## 2020-10-16 RX ORDER — IPRATROPIUM BROMIDE AND ALBUTEROL SULFATE 2.5; .5 MG/3ML; MG/3ML
6 SOLUTION RESPIRATORY (INHALATION) ONCE
Status: COMPLETED | OUTPATIENT
Start: 2020-10-16 | End: 2020-10-16

## 2020-10-16 RX ORDER — SODIUM CHLORIDE 9 MG/ML
INJECTION, SOLUTION INTRAVENOUS CONTINUOUS
Status: DISCONTINUED | OUTPATIENT
Start: 2020-10-16 | End: 2020-10-16 | Stop reason: HOSPADM

## 2020-10-16 RX ADMIN — POTASSIUM CHLORIDE 40 MEQ: 1500 TABLET, EXTENDED RELEASE ORAL at 12:22

## 2020-10-16 RX ADMIN — IPRATROPIUM BROMIDE AND ALBUTEROL SULFATE 3 ML: .5; 3 SOLUTION RESPIRATORY (INHALATION) at 11:30

## 2020-10-16 RX ADMIN — SODIUM CHLORIDE 1000 ML: 9 INJECTION, SOLUTION INTRAVENOUS at 11:28

## 2020-10-16 RX ADMIN — POTASSIUM CHLORIDE 10 MEQ: 7.46 INJECTION, SOLUTION INTRAVENOUS at 12:20

## 2020-10-16 RX ADMIN — LORAZEPAM 1 MG: 2 INJECTION INTRAMUSCULAR; INTRAVENOUS at 11:25

## 2020-10-16 RX ADMIN — METHYLPREDNISOLONE SODIUM SUCCINATE 125 MG: 125 INJECTION, POWDER, FOR SOLUTION INTRAMUSCULAR; INTRAVENOUS at 11:28

## 2020-10-16 RX ADMIN — METOPROLOL TARTRATE 5 MG: 5 INJECTION INTRAVENOUS at 13:06

## 2020-10-16 ASSESSMENT — ENCOUNTER SYMPTOMS
DIAPHORESIS: 1
DIZZINESS: 1
PALPITATIONS: 1
SHORTNESS OF BREATH: 1

## 2020-10-16 ASSESSMENT — MIFFLIN-ST. JEOR: SCORE: 1565.29

## 2020-10-16 NOTE — ED TRIAGE NOTES
"Pt has hx of asthma and \"haven't had an attack in years\".  Pt had difficulty breathing yesterday and again today while she was at work at a  and moving around quickly.  Pt gasping for breath at a rapid rate on arrival to ED.  "

## 2020-10-16 NOTE — ED AVS SNAPSHOT
[facility]  [address]  [facility] [address]  Phone: [phone]  Fax: [phone]                                    Jose Corral   MRN: [id number]    Department: [facility]   Date of Visit: 10/16/2020           After Visit Summary Signature Page    I have received my discharge instructions, and my questions have been answered. I have discussed any challenges I see with this plan with the nurse or doctor.    ..........................................................................................................................................  Patient/Patient Representative Signature      ..........................................................................................................................................  Patient Representative Print Name and Relationship to Patient    ..................................................               ................................................  Date                                   Time    ..........................................................................................................................................  Reviewed by Signature/Title    ...................................................              ..............................................  Date                                               Time          22EPIC Rev 08/18

## 2020-10-16 NOTE — ED PROVIDER NOTES
History     Chief Complaint:  Shortness of Breath     HPI   Jose Corral is a 49 year old female with history of asthma who presents for evaluation of shortness of breath. The patient reports that last night after work she started to become short of breath and feeling that her asthma was being exacerbated. However, this improved until this morning on her way to work the shortness of breath returned. She endorses associated palpitations describing that her heart is racing and that she is dizzy and diaphoretic. The patient denies chest pain. She notes that this has happened prior and she is a daily smoker.     Allergies:  No Known Drug Allergies    Medications:   No current medications.     Past Medical History:    Asthma     Past Surgical History:    Past surgical history reviewed. No pertinent past surgical history.      Family History:    Family history reviewed. No pertinent family history.     Social History:  The patient was unaccompanied to the ED.  Smoking Status: Never Smoker  Smokeless Tobacco: Never Used  Alcohol Use: Positive  Drug Use: Negative    Review of Systems   Constitutional: Positive for diaphoresis.   Respiratory: Positive for shortness of breath.    Cardiovascular: Positive for palpitations. Negative for chest pain.   Neurological: Positive for dizziness.   All other systems reviewed and are negative.    Physical Exam     Patient Vitals for the past 24 hrs:   BP Temp Temp src Pulse Resp SpO2 Height Weight   10/16/20 1345 (!) 194/140 -- -- 77 -- 100 % -- --   10/16/20 1330 (!) 214/130 -- -- 74 -- 100 % -- --   10/16/20 1315 -- -- -- 74 -- 98 % -- --   10/16/20 1300 (!) 216/127 -- -- 85 -- 97 % -- --   10/16/20 1245 (!) 194/121 -- -- 82 -- 100 % -- --   10/16/20 1230 (!) 191/129 -- -- 78 -- 100 % -- --   10/16/20 1215 (!) 207/119 -- -- 87 -- 92 % -- --   10/16/20 1200 (!) 162/114 -- -- 82 -- 100 % -- --   10/16/20 1145 (!) 168/87 -- -- 80 -- 97 % -- --   10/16/20 1130 (!) 197/118 -- -- 77 --  "91 % -- --   10/16/20 1115 (!) 138/100 -- -- 85 -- -- -- --   10/16/20 1100 (!) 136/117 98.5  F (36.9  C) Oral 101 (!) 48 100 % 1.651 m (5' 5\") 93.4 kg (206 lb)       Physical Exam    Constitutional: Alert. Breathing rapid shallow breaths. She appears tremulous and anxious.   HENT:    Nose: Nose normal.    Mouth/Throat: Oropharynx is clear, mucous membranes are moist  Eyes: EOM are normal. Pupils are equal, round, and reactive to light.   CV: Tachycardic rate. Regular rhythm, no murmurs, rubs or gallops.  Resp: tachypneic but clear lungs. No stridor, no wheezing, no adventitious lung sounds.  GI: Soft, non-distended. There is no tenderness. No rebound or guarding.   MSK: Normal range of motion. No deformity.   Neurological:   A/Ox3  5/5 strength is symmetric to the upper and lower extremities;   Sensation intact to light touch throughout the upper and lower extremities;   Skin: Skin is warm and dry.    Emergency Department Course     ECG:  ECG taken at 1122  Sinus rhythm   Moderate voltage criteria for LVH, may be normal variant   Borderline ECG  Rate 77 bpm. MA interval 162 ms. QRS duration 82 ms. QT/QTc 422/477 ms. P-R-T axes 47 21 34.    Imaging:  Radiology findings were communicated with the patient who voiced understanding of the findings.    XR Chest Port 1 View  Heart size is normal. Pulmonary vasculature is not  distended. Lungs are clear. No pleural fluid. No acute disease.  Reading per radiology     Laboratory:  Laboratory findings were communicated with the patient who voiced understanding of the findings.    CBC: WBC 7.3, HGB 12.6,   CMP: Potassium 2.8 (L), Glucose 135 (H), Albumin 3.3 (L), o/w WNL (Creatinine 0.59)    Troponin (Collected 1112): <0.015    Lactic Acid (Resulted: 1126): 1.6     Blood Culture x2: Pending         Interventions:  1125 Ativan 1 mg IV  1128 NS 1000 mL IV  1128 solu-medrol 125 mg IV   1220 potassium chloride 10 mEq IV  1222 potassium chloride 40 mEq PO  Metoprolol 5 mg " IV    Emergency Department Course:     Nursing notes and vitals reviewed. I performed an exam of the patient as documented above.     1112 IV was inserted and blood was drawn for laboratory testing, results above.    1122 EKG obtained as noted above.     1154 Portable chest XR obtained in the ED, results above.      Prior to discharge, I personally reviewed the results with the patient and all related questions were answered. The patient verbalized understanding and is amenable to plan.     Impression & Plan        Medical Decision Making:  This is a 49-year-old female who comes in appearing quite anxious and having difficulty breathing.  On exam, the patient is having clear lung sounds and is satting 100% on room air.  She was given a DuoNeb as she has a history of asthma.  Patient improved shortly after DuoNeb.  Labs and x-ray were obtained.  Potassium returned at 2.8.  She received IV potassium and oral potassium as well.  Her blood pressures did remain elevated despite her calming down and no longer tightening her muscles whenever blood pressure machine will go off.  She does have a history of intermittent hypertension when these episodes happen.  Patient was given 5 mg of IV metoprolol and this significantly improved her blood pressure and overshot a 20% improvement.  She continued to have no focal neurologic deficits.  She was feeling much better and was asking to be discharged home.  Troponin negative.  EKG nonischemic.  I do believe that the patient's constellation of symptoms is due to a combination of hypokalemia, anxiety, and high blood pressure.  Clinical suspicion for tachyarrhythmia, preexcitation arrhythmia, acute coronary syndrome, pulmonary embolism, dissection, pneumothorax, perforated viscus is low at this time.  Patient states that symptoms like this have happened before in the past and they always resolved.  She was concerned because there were 2 episodes within the last 24 hours today.  I  believe these episodes may be primarily brought on by her hypokalemia and recommended her to start oral potassium at home.  Patient also requested a prescription for blood pressure medicine as this is been recurrent but she does not see a primary care doctor regularly.  I prescribed her lisinopril and referred her to a primary care doctor.  Patient expressed understanding and she was in agreement the plan of care.        Diagnosis:      ICD-10-CM    1. Hypokalemia  E87.6    2. Secondary hypertension  I15.9        Disposition:   The patient is discharged to home.     Discharge Medications:  New Prescriptions    LISINOPRIL (ZESTRIL) 20 MG TABLET    Take 1 tablet (20 mg) by mouth daily for 21 days    POTASSIUM CHLORIDE ER (KLOR-CON M) 20 MEQ CR TABLET    Take 1 tablet (20 mEq) by mouth 2 times daily for 14 days     Scribe Disclosure:  I, Orla Severson, am serving as a scribe at 11:03 AM on 10/16/2020 to document services personally performed by Narayan Jones DO based on my observations and the provider's statements to me.     [facility] EMERGENCY DEPT         Narayan Jones DO  10/16/20 1403

## 2020-10-21 LAB
BACTERIA SPEC CULT: NO GROWTH
BACTERIA SPEC CULT: NO GROWTH
SPECIMEN SOURCE: NORMAL
SPECIMEN SOURCE: NORMAL

## 2020-11-05 ENCOUNTER — APPOINTMENT (OUTPATIENT)
Dept: ULTRASOUND IMAGING | Facility: CLINIC | Age: 50
End: 2020-11-05
Attending: EMERGENCY MEDICINE
Payer: OTHER GOVERNMENT

## 2020-11-05 ENCOUNTER — HOSPITAL ENCOUNTER (EMERGENCY)
Facility: CLINIC | Age: 50
Discharge: HOME | End: 2020-11-05
Attending: EMERGENCY MEDICINE | Admitting: EMERGENCY MEDICINE
Payer: OTHER GOVERNMENT

## 2020-11-05 VITALS
RESPIRATION RATE: 20 BRPM | SYSTOLIC BLOOD PRESSURE: 190 MMHG | TEMPERATURE: 98.8 F | HEART RATE: 70 BPM | OXYGEN SATURATION: 99 % | DIASTOLIC BLOOD PRESSURE: 115 MMHG

## 2020-11-05 DIAGNOSIS — R60.0 BILATERAL LOWER EXTREMITY EDEMA: ICD-10-CM

## 2020-11-05 DIAGNOSIS — I10 HYPERTENSION, UNSPECIFIED TYPE: ICD-10-CM

## 2020-11-05 PROBLEM — A59.01 TRICHOMONAL VAGINITIS: Status: ACTIVE | Noted: 2020-11-05

## 2020-11-05 PROBLEM — S06.5XAA SUBDURAL HEMATOMA (H): Status: ACTIVE | Noted: 2020-11-05

## 2020-11-05 LAB
ALBUMIN SERPL-MCNC: 3.3 G/DL (ref 3.4–5)
ALBUMIN UR-MCNC: 10 MG/DL
ALP SERPL-CCNC: 67 U/L (ref 40–150)
ALT SERPL W P-5'-P-CCNC: 26 U/L (ref 0–50)
ANION GAP SERPL CALCULATED.3IONS-SCNC: 10 MMOL/L (ref 3–14)
APPEARANCE UR: ABNORMAL
AST SERPL W P-5'-P-CCNC: 43 U/L (ref 0–45)
BACTERIA #/AREA URNS HPF: ABNORMAL /HPF
BASOPHILS # BLD AUTO: 0 10E9/L (ref 0–0.2)
BASOPHILS NFR BLD AUTO: 1.1 %
BILIRUB SERPL-MCNC: 0.4 MG/DL (ref 0.2–1.3)
BILIRUB UR QL STRIP: NEGATIVE
BUN SERPL-MCNC: 10 MG/DL (ref 7–30)
CALCIUM SERPL-MCNC: 8.2 MG/DL (ref 8.5–10.1)
CHLORIDE SERPL-SCNC: 109 MMOL/L (ref 94–109)
CO2 SERPL-SCNC: 25 MMOL/L (ref 20–32)
COLOR UR AUTO: ABNORMAL
CREAT SERPL-MCNC: 0.6 MG/DL (ref 0.52–1.04)
D DIMER PPP FEU-MCNC: 1.3 UG/ML FEU (ref 0–0.5)
DIFFERENTIAL METHOD BLD: ABNORMAL
EOSINOPHIL # BLD AUTO: 0 10E9/L (ref 0–0.7)
EOSINOPHIL NFR BLD AUTO: 0.8 %
ERYTHROCYTE [DISTWIDTH] IN BLOOD BY AUTOMATED COUNT: 19.6 % (ref 10–15)
GFR SERPL CREATININE-BSD FRML MDRD: >90 ML/MIN/{1.73_M2}
GLUCOSE SERPL-MCNC: 88 MG/DL (ref 70–99)
GLUCOSE UR STRIP-MCNC: NEGATIVE MG/DL
HCT VFR BLD AUTO: 32.7 % (ref 35–47)
HGB BLD-MCNC: 11.2 G/DL (ref 11.7–15.7)
HGB UR QL STRIP: NEGATIVE
IMM GRANULOCYTES # BLD: 0 10E9/L (ref 0–0.4)
IMM GRANULOCYTES NFR BLD: 0.3 %
INR PPP: 0.95 (ref 0.86–1.14)
INTERPRETATION ECG - MUSE: NORMAL
KETONES UR STRIP-MCNC: NEGATIVE MG/DL
LEUKOCYTE ESTERASE UR QL STRIP: NEGATIVE
LYMPHOCYTES # BLD AUTO: 1.1 10E9/L (ref 0.8–5.3)
LYMPHOCYTES NFR BLD AUTO: 29.1 %
MAGNESIUM SERPL-MCNC: 1.8 MG/DL (ref 1.6–2.3)
MCH RBC QN AUTO: 31.5 PG (ref 26.5–33)
MCHC RBC AUTO-ENTMCNC: 34.3 G/DL (ref 31.5–36.5)
MCV RBC AUTO: 92 FL (ref 78–100)
MONOCYTES # BLD AUTO: 0.5 10E9/L (ref 0–1.3)
MONOCYTES NFR BLD AUTO: 13.1 %
MUCOUS THREADS #/AREA URNS LPF: PRESENT /LPF
NEUTROPHILS # BLD AUTO: 2.1 10E9/L (ref 1.6–8.3)
NEUTROPHILS NFR BLD AUTO: 55.6 %
NITRATE UR QL: NEGATIVE
NRBC # BLD AUTO: 0 10*3/UL
NRBC BLD AUTO-RTO: 0 /100
PH UR STRIP: 6.5 PH (ref 5–7)
PLATELET # BLD AUTO: 313 10E9/L (ref 150–450)
POTASSIUM SERPL-SCNC: 3.8 MMOL/L (ref 3.4–5.3)
PROT SERPL-MCNC: 7.4 G/DL (ref 6.8–8.8)
RBC # BLD AUTO: 3.55 10E12/L (ref 3.8–5.2)
RBC #/AREA URNS AUTO: 1 /HPF (ref 0–2)
SARS-COV-2 RNA SPEC QL NAA+PROBE: NOT DETECTED
SODIUM SERPL-SCNC: 144 MMOL/L (ref 133–144)
SOURCE: ABNORMAL
SP GR UR STRIP: 1.02 (ref 1–1.03)
SPECIMEN SOURCE: NORMAL
SQUAMOUS #/AREA URNS AUTO: 8 /HPF (ref 0–1)
UROBILINOGEN UR STRIP-MCNC: NORMAL MG/DL (ref 0–2)
WBC # BLD AUTO: 3.7 10E9/L (ref 4–11)
WBC #/AREA URNS AUTO: 2 /HPF (ref 0–5)

## 2020-11-05 PROCEDURE — C9803 HOPD COVID-19 SPEC COLLECT: HCPCS

## 2020-11-05 PROCEDURE — 85610 PROTHROMBIN TIME: CPT | Performed by: EMERGENCY MEDICINE

## 2020-11-05 PROCEDURE — 93970 EXTREMITY STUDY: CPT

## 2020-11-05 PROCEDURE — 99285 EMERGENCY DEPT VISIT HI MDM: CPT | Mod: 25

## 2020-11-05 PROCEDURE — 81001 URINALYSIS AUTO W/SCOPE: CPT | Performed by: EMERGENCY MEDICINE

## 2020-11-05 PROCEDURE — 80053 COMPREHEN METABOLIC PANEL: CPT | Performed by: EMERGENCY MEDICINE

## 2020-11-05 PROCEDURE — U0003 INFECTIOUS AGENT DETECTION BY NUCLEIC ACID (DNA OR RNA); SEVERE ACUTE RESPIRATORY SYNDROME CORONAVIRUS 2 (SARS-COV-2) (CORONAVIRUS DISEASE [COVID-19]), AMPLIFIED PROBE TECHNIQUE, MAKING USE OF HIGH THROUGHPUT TECHNOLOGIES AS DESCRIBED BY CMS-2020-01-R: HCPCS | Performed by: EMERGENCY MEDICINE

## 2020-11-05 PROCEDURE — 84703 CHORIONIC GONADOTROPIN ASSAY: CPT | Performed by: EMERGENCY MEDICINE

## 2020-11-05 PROCEDURE — 250N000013 HC RX MED GY IP 250 OP 250 PS 637: Performed by: EMERGENCY MEDICINE

## 2020-11-05 PROCEDURE — 93005 ELECTROCARDIOGRAM TRACING: CPT

## 2020-11-05 PROCEDURE — 83735 ASSAY OF MAGNESIUM: CPT | Performed by: EMERGENCY MEDICINE

## 2020-11-05 PROCEDURE — 85025 COMPLETE CBC W/AUTO DIFF WBC: CPT | Performed by: EMERGENCY MEDICINE

## 2020-11-05 PROCEDURE — 85379 FIBRIN DEGRADATION QUANT: CPT | Performed by: EMERGENCY MEDICINE

## 2020-11-05 RX ORDER — LOSARTAN POTASSIUM AND HYDROCHLOROTHIAZIDE 12.5; 5 MG/1; MG/1
1 TABLET ORAL DAILY
Qty: 30 TABLET | Refills: 0 | Status: SHIPPED | OUTPATIENT
Start: 2020-11-05 | End: 2021-03-12

## 2020-11-05 RX ORDER — ACETAMINOPHEN 500 MG
1000 TABLET ORAL ONCE
Status: COMPLETED | OUTPATIENT
Start: 2020-11-05 | End: 2020-11-05

## 2020-11-05 RX ADMIN — ACETAMINOPHEN 1000 MG: 500 TABLET, FILM COATED ORAL at 08:13

## 2020-11-05 ASSESSMENT — ENCOUNTER SYMPTOMS
CONFUSION: 0
DYSURIA: 0
SORE THROAT: 0
SHORTNESS OF BREATH: 0
ACTIVITY CHANGE: 0
MYALGIAS: 0
VOMITING: 0
ABDOMINAL PAIN: 0
APPETITE CHANGE: 0
DIFFICULTY URINATING: 0
FEVER: 0
SEIZURES: 0
COUGH: 0
FLANK PAIN: 0
NAUSEA: 0

## 2020-11-05 NOTE — DISCHARGE INSTRUCTIONS
Elevate the legs as much as possible. Elevation means the legs need to be ABOVE THE HEART. Sitting upright in a recliner with the legs on the foot rest is not enough elevation because the legs are still below the heart.       Discharge Instructions  Hypertension - High Blood Pressure    During you visit to the Emergency Department, your blood pressure was higher than the recommended blood pressure.  This may be related to stress, pain, medication or other temporary conditions. In these cases, your blood pressure may return to normal on its own. If you have a history of high blood pressure, you may need to have your provider adjust your medications. Sometimes, your high measurement here may indicate that you have developed high blood pressure that will stay high unless it is treated. As a general rule, high blood pressure causes problems over years rather than days, weeks, or months. So, while it is important to treat blood pressure, it is rarely important to treat blood pressure immediately. Occasionally we will begin a medication in the Emergency Department; more often we will recommend close follow-up for medications with a primary doctor/clinic.    Generally, every Emergency Department visit should have a follow-up clinic visit with either a primary or a specialty clinic/provider. Please follow-up as instructed by your emergency provider today.    Return to the Emergency Department if you start to have:  A severe headache.  Chest pain.  Shortness of breath.  Weakness or numbness that affects one part of the body.  Confusion.  Vision changes.  Significant swelling of legs and/or eyes.  A reaction to any medication started in the Emergency Department.    What can I do to help myself?  Avoid alcohol.  Take any blood pressure medicine that you are prescribed.  Get a good night s sleep.  Lower your salt intake.  Exercise.  Lose weight.  Manage stress.  See your doctor regularly    If blood pressure medication was  started in the Emergency Department:  The medicine may not have an immediate effect. The body and brain determine what blood pressure you have. The medicine s job is to retrain the body s  thermostat  to a lower blood pressure.  You will need to follow up with your provider to see how this medicine is working for you.  If you were given a prescription for medicine here today, be sure to read all of the information (including the package insert) that comes with your prescription.  This will include important information about the medicine, its side effects, and any warnings that you need to know about.  The pharmacist who fills the prescription can provide more information and answer questions you may have about the medicine.  If you have questions or concerns that the pharmacist cannot address, please call or return to the Emergency Department.   Remember that you can always come back to the Emergency Department if you are not able to see your regular provider in the amount of time listed above, if you get any new symptoms, or if there is anything that worries you.

## 2020-11-05 NOTE — ED NOTES
Updated patient on plan of care and test results. Headache improved and BP remains elevated, currently lower than upon arrival.

## 2020-11-05 NOTE — ED TRIAGE NOTES
Pt aox4, ABCs. Pt arrives c/o leg pain and swelling to both legs. Pt able to ambulate without difficulty. Pain 8/10, pain increases with palpation.

## 2020-11-05 NOTE — ED AVS SNAPSHOT
[facility] Emergency Dept  [address]  [facility] [address]  Phone: [phone]  Fax: [phone]                                    Jose Corral   MRN: [id number]    Department: [facility] Emergency Dept   Date of Visit: 11/5/2020           After Visit Summary Signature Page    I have received my discharge instructions, and my questions have been answered. I have discussed any challenges I see with this plan with the nurse or doctor.    ..........................................................................................................................................  Patient/Patient Representative Signature      ..........................................................................................................................................  Patient Representative Print Name and Relationship to Patient    ..................................................               ................................................  Date                                   Time    ..........................................................................................................................................  Reviewed by Signature/Title    ...................................................              ..............................................  Date                                               Time          22EPIC Rev 08/18

## 2020-11-05 NOTE — ED PROVIDER NOTES
History     Chief Complaint:  Leg Swelling       HPI  Jose Corral is a 49 year old female with a history of alcohol abuse, asthma, sarcoidosis who presents for evaluation of bilateral lower extremity swelling over the last 4 to 5 days.  The patient states that she sleeps in a recliner and she puts the foot rest up at night however her feet and legs are still in a dependent position.  She denies any history of heart problems or kidney problems.  She states that both legs are swollen the same and she is also noted some puffiness around her face.  She denies any pain, fall or injury, abdominal pain or swelling, chest pain, shortness of breath or orthopnea.  She states that her asthma has been well controlled recently.  She denies any recent fevers or illness.  She states that this is happened transiently in the past after traveling or long car rides but this time she has not had any recent travel.  She denies any other symptoms at this time.  No change in urine output.    Allergies:  No Known Drug Allergies     Medications:   Zestril    Medical History:   Alcohol abuse   Diabetes type 2   Carpal tunnel  Obesity   Allergic rhinitis   Hypertension   Subdural hematoma  Asthma  Sarcoidosis   Lymphadenopathy   Depression    Surgical History   Cholecystectomy    Family History:   Father:  Diabetes, hypertension  Mother: Diabetes, CAD, hypertension     Social History:  Patient was not accompanied to the ED.   Smoking Status:  Smoker    Type: Cigarettes  Smokeless Tobacco: Never Used   Alcohol Use: Positive   Drug Use: Negative      Review of Systems   Constitutional: Negative for activity change, appetite change and fever.   HENT: Negative for congestion and sore throat.    Respiratory: Negative for cough and shortness of breath.    Cardiovascular: Positive for leg swelling. Negative for chest pain.   Gastrointestinal: Negative for abdominal pain, nausea and vomiting.   Genitourinary: Negative for difficulty urinating,  dysuria and flank pain.   Musculoskeletal: Negative for myalgias.   Skin: Negative for rash.   Neurological: Negative for seizures and syncope.   Psychiatric/Behavioral: Negative for confusion.   All other systems reviewed and are negative.      Physical Exam     Patient Vitals for the past 24 hrs:   BP Temp Pulse Resp SpO2   11/05/20 0703 (!) 205/136 -- -- 20 --   11/05/20 0701 -- 98.8  F (37.1  C) 103 -- 97 %          Physical Exam  General: Well appearing, nontoxic. Resting comfortably  Head:  Scalp, face, and head appear normal  Eyes:  Pupils are equal, round    Conjunctivae non-injected and sclerae white  ENT:    The external nose is normal    Pinnae are normal  Neck:  Normal range of motion    There is no rigidity noted    Trachea is in the midline  CV:  Regular rate and rhythm     Normal S1/S2, no S3/S4    No murmur or rub. Radial pulses 2+ bilaterally. DP pulses 2+ and intact bilaterally.   Resp:  Lungs are clear and equal bilaterally  There is no tachypnea    No increased work of breathing    No rales, wheezing, or rhonchi  GI:  Abdomen is soft, obese, no rigidity or guarding    No distension, or mass    No tenderness or rebound tenderness   MS:  Normal muscular tone    Symmetric motor strength    2+ pitting edema to the bilateral lower extremities below the knees. Calves, compartments and musculature of the bilateral lower extremities are atraumatic and non tender to palpation.  Skin:  No rash or acute skin lesions noted  Neuro: Awake and alert  Speech is normal and fluent  Moves all extremities spontaneously  Psych:  Normal affect. Appropriate interactions.      Emergency Department Course     ECG:  ECG taken at 0754, ECG read at 0806  Normal sinus rhythm  Minimal voltage criteria for LVH, may be normal variant  Septal infarct, age undetermined  Abnormal ECG  Rate 81 bpm. AR interval 158 ms. QRS duration 82 ms. QT/QTc 416/483 ms. P-R-T axes 45 16 58.     Imaging:  Radiology results were communicated with  the patient who voiced understanding of the findings.    US Lower Extremity Venous Duplex Bilateral  No deep venous thrombosis in the bilateral lower  extremities. Evaluation of the peroneal veins in the calves is  somewhat limited due to body habitus and lower extremity edema.    Reading per radiology     Laboratory:  Laboratory findings were communicated with the patient who voiced understanding of the findings.    CBC: WBC 3.7 (L) , HGB 11.2 (L) ,    CMP: Albumin 3.3 (L) Calcium 8.2 (L)  o/w WNL (Creatinine 0.60)    INR: 0.95  Magnesium: 1.8  D dimer: 1.3 (H)     UA with Microscopic: Protein albumin 10 (A) Bacteria few (A) .Squamous Epithelial 8 (H) Mucous present (A) o/w WNL      Asymptomatic COVID-19 Virus (Coronavirus), PCR NP Swab: pending        Interventions:   0813 Tylenol 1000 mg PO     Emergency Department Course:  0702 Nursing notes and vitals reviewed.   I performed an exam of the patient as documented above.     0754 EKG obtained as noted above.     0802 IV was inserted and blood was drawn for laboratory testing, results above.    0905 The patient provided a urine sample here in the emergency department. This was sent for laboratory testing, findings above.     0931 The patient was sent for US while in the emergency department, results above.    1044 Findings and plan explained to the Patient. Patient discharged home with instructions regarding supportive care, medications, and reasons to return. The importance of close follow-up was reviewed. The patient was prescribed as below.    Impression & Plan     Medical Decision Making:  Jose Corral is a 49 year old female who presents for evaluation of bilateral lower extremity edema.  On my evaluation she is well-appearing, hemodynamically stable and afebrile.  No increased work of breathing, respiratory stress.  Lungs are clear to auscultation without evidence of rales or pulmonary edema.  She denies any orthopnea.  No fevers or infectious  signs or symptoms.  A broad differential diagnosis is considered.  Notably the patient was diagnosed with elevated blood pressure readings at prior ED visits however she reports she has not followed up with a primary care provider regarding this.  Today her blood pressure is significantly elevated at 190-200 systolic.  She denies any chest pain or symptoms consistent with cardiac ischemia.  Work-up in the emergency department revealed unremarkable complete chemistry.  CBC shows mild normocytic anemia with hemoglobin of 11.2.  Patient denies any blood loss.  D-dimer was elevated at 1.3 therefore bilateral lower extremity venous duplex ultrasounds were obtained these were negative for any evidence of DVT.  Patient's clinical symptoms are not consistent with pulmonary embolism and I would not work-up further for pulmonary embolism at this time.  EKG shows normal sinus rhythm without evidence of ischemia or dysrhythmia.  There is no change from prior EKGs.  Urinalysis does show mild proteinuria.  Total serum protein is normal.  Ultimately I feel that her bilateral lower extremity edema is likely related to uncontrolled hypertension.  At this time there is no evidence for overt heart failure.  I discussed the findings with the patient.  I stressed the importance of blood pressure control and will start the patient on combination losartan hydrochlorothiazide.  I recommended compression stockings as well as elevating the legs is much as possible.  Patient require close outpatient follow-up for blood pressure recheck and medication titration as well as monitoring of her symptoms.  The patient was agreeable with the plan of care.  Close return precautions were provided and she was discharged in stable condition.    Covid-19  Jose Corral was evaluated during a global COVID-19 pandemic, which necessitated consideration that the patient might be at risk for infection with the SARS-CoV-2 virus that causes COVID-19.    Applicable protocols for evaluation were followed during the patient's care.   COVID-19 was considered as part of the patient's evaluation. The plan for testing is:  a test was obtained during this visit.    Diagnosis:     ICD-10-CM    1. Hypertension, unspecified type  I10    2. Bilateral lower extremity edema  R60.0         Disposition:  Discharged to home.    Discharge Medications:  New Prescriptions    LOSARTAN-HYDROCHLOROTHIAZIDE (HYZAAR) 50-12.5 MG TABLET    Take 1 tablet by mouth daily       Scribe Disclosure:  Hemanth DAMON, am serving as a scribe at 7:11 AM on 11/5/2020 to document services personally performed by Osmin Velasquez MD based on my observations and the provider's statements to me.     [place]rtle BeachOsmin Price RD, MD  11/05/20 1140

## 2020-11-06 NOTE — CONSULTS
"Care Management Note        ED Provider send me a message through \"in Basket\" asking me to assist Pt with a PCP    Pt is homeless and living in a hotel in [place].   Pt does not have any health insurance.    I phoned Pt at [phone], and educated Pt that there are free clinics where she can make an appointment and have a doctor help her manage her health.  Pt was agreeable to making an appointment on her own.  She did not have a way to write down the information and asked me to text it to her.  I texted Pt the following information:    [facility]  [phone]  [facility]  [phone].    Glory Harrington RN Care Coordinator,  BSN, PHN, CCM, MARIANNE  Inpatient Care Coordination - Emergency Department  [facility]   [phone]      "

## 2021-03-12 ENCOUNTER — HOSPITAL ENCOUNTER (EMERGENCY)
Facility: CLINIC | Age: 51
Discharge: HOME | End: 2021-03-12
Attending: EMERGENCY MEDICINE | Admitting: EMERGENCY MEDICINE

## 2021-03-12 ENCOUNTER — APPOINTMENT (OUTPATIENT)
Dept: CT IMAGING | Facility: CLINIC | Age: 51
End: 2021-03-12
Attending: EMERGENCY MEDICINE

## 2021-03-12 ENCOUNTER — APPOINTMENT (OUTPATIENT)
Dept: GENERAL RADIOLOGY | Facility: CLINIC | Age: 51
End: 2021-03-12
Attending: EMERGENCY MEDICINE

## 2021-03-12 ENCOUNTER — HOSPITAL ENCOUNTER (EMERGENCY)
Facility: CLINIC | Age: 51
End: 2021-03-12

## 2021-03-12 VITALS
RESPIRATION RATE: 20 BRPM | SYSTOLIC BLOOD PRESSURE: 199 MMHG | DIASTOLIC BLOOD PRESSURE: 107 MMHG | HEART RATE: 73 BPM | TEMPERATURE: 97 F | OXYGEN SATURATION: 100 %

## 2021-03-12 DIAGNOSIS — I10 BENIGN ESSENTIAL HYPERTENSION: ICD-10-CM

## 2021-03-12 DIAGNOSIS — F10.920 ALCOHOLIC INTOXICATION WITHOUT COMPLICATION (H): ICD-10-CM

## 2021-03-12 DIAGNOSIS — F32.A DEPRESSION, UNSPECIFIED DEPRESSION TYPE: ICD-10-CM

## 2021-03-12 DIAGNOSIS — S00.03XA HEMATOMA OF FRONTAL SCALP, INITIAL ENCOUNTER: ICD-10-CM

## 2021-03-12 LAB
ANION GAP SERPL CALCULATED.3IONS-SCNC: 7 MMOL/L (ref 3–14)
B-HCG FREE SERPL-ACNC: <5 IU/L
BASOPHILS # BLD AUTO: 0 10E9/L (ref 0–0.2)
BASOPHILS NFR BLD AUTO: 1.2 %
BUN SERPL-MCNC: 11 MG/DL (ref 7–30)
CALCIUM SERPL-MCNC: 8.8 MG/DL (ref 8.5–10.1)
CHLORIDE SERPL-SCNC: 108 MMOL/L (ref 94–109)
CO2 SERPL-SCNC: 28 MMOL/L (ref 20–32)
CREAT SERPL-MCNC: 0.51 MG/DL (ref 0.52–1.04)
DIFFERENTIAL METHOD BLD: ABNORMAL
EOSINOPHIL # BLD AUTO: 0 10E9/L (ref 0–0.7)
EOSINOPHIL NFR BLD AUTO: 1.2 %
ERYTHROCYTE [DISTWIDTH] IN BLOOD BY AUTOMATED COUNT: 21.9 % (ref 10–15)
ETHANOL SERPL-MCNC: 0.41 G/DL
GFR SERPL CREATININE-BSD FRML MDRD: >90 ML/MIN/{1.73_M2}
GLUCOSE SERPL-MCNC: 87 MG/DL (ref 70–99)
HCT VFR BLD AUTO: 34.9 % (ref 35–47)
HGB BLD-MCNC: 12 G/DL (ref 11.7–15.7)
IMM GRANULOCYTES # BLD: 0 10E9/L (ref 0–0.4)
IMM GRANULOCYTES NFR BLD: 0 %
INR PPP: 0.93 (ref 0.86–1.14)
INTERPRETATION ECG - MUSE: NORMAL
LYMPHOCYTES # BLD AUTO: 1.2 10E9/L (ref 0.8–5.3)
LYMPHOCYTES NFR BLD AUTO: 35.1 %
MCH RBC QN AUTO: 31 PG (ref 26.5–33)
MCHC RBC AUTO-ENTMCNC: 34.4 G/DL (ref 31.5–36.5)
MCV RBC AUTO: 90 FL (ref 78–100)
MONOCYTES # BLD AUTO: 0.4 10E9/L (ref 0–1.3)
MONOCYTES NFR BLD AUTO: 11.9 %
NEUTROPHILS # BLD AUTO: 1.8 10E9/L (ref 1.6–8.3)
NEUTROPHILS NFR BLD AUTO: 50.6 %
NRBC # BLD AUTO: 0 10*3/UL
NRBC BLD AUTO-RTO: 0 /100
NT-PROBNP SERPL-MCNC: 75 PG/ML (ref 0–450)
PLATELET # BLD AUTO: 311 10E9/L (ref 150–450)
POTASSIUM SERPL-SCNC: 3.4 MMOL/L (ref 3.4–5.3)
RBC # BLD AUTO: 3.87 10E12/L (ref 3.8–5.2)
SODIUM SERPL-SCNC: 143 MMOL/L (ref 133–144)
TROPONIN I SERPL-MCNC: <0.015 UG/L (ref 0–0.04)
WBC # BLD AUTO: 3.5 10E9/L (ref 4–11)

## 2021-03-12 PROCEDURE — 84702 CHORIONIC GONADOTROPIN TEST: CPT

## 2021-03-12 PROCEDURE — 93005 ELECTROCARDIOGRAM TRACING: CPT

## 2021-03-12 PROCEDURE — 82077 ASSAY SPEC XCP UR&BREATH IA: CPT | Performed by: EMERGENCY MEDICINE

## 2021-03-12 PROCEDURE — 85610 PROTHROMBIN TIME: CPT | Performed by: EMERGENCY MEDICINE

## 2021-03-12 PROCEDURE — 70486 CT MAXILLOFACIAL W/O DYE: CPT

## 2021-03-12 PROCEDURE — 83880 ASSAY OF NATRIURETIC PEPTIDE: CPT | Performed by: EMERGENCY MEDICINE

## 2021-03-12 PROCEDURE — 71046 X-RAY EXAM CHEST 2 VIEWS: CPT

## 2021-03-12 PROCEDURE — 99285 EMERGENCY DEPT VISIT HI MDM: CPT | Mod: 25

## 2021-03-12 PROCEDURE — 250N000011 HC RX IP 250 OP 636: Performed by: EMERGENCY MEDICINE

## 2021-03-12 PROCEDURE — 80048 BASIC METABOLIC PNL TOTAL CA: CPT | Performed by: EMERGENCY MEDICINE

## 2021-03-12 PROCEDURE — 250N000013 HC RX MED GY IP 250 OP 250 PS 637: Performed by: EMERGENCY MEDICINE

## 2021-03-12 PROCEDURE — 85025 COMPLETE CBC W/AUTO DIFF WBC: CPT | Performed by: EMERGENCY MEDICINE

## 2021-03-12 PROCEDURE — 90791 PSYCH DIAGNOSTIC EVALUATION: CPT

## 2021-03-12 PROCEDURE — 70450 CT HEAD/BRAIN W/O DYE: CPT

## 2021-03-12 PROCEDURE — 96374 THER/PROPH/DIAG INJ IV PUSH: CPT

## 2021-03-12 PROCEDURE — 84484 ASSAY OF TROPONIN QUANT: CPT | Performed by: EMERGENCY MEDICINE

## 2021-03-12 RX ORDER — GLIPIZIDE 10 MG/1
1 TABLET ORAL
Status: DISCONTINUED | OUTPATIENT
Start: 2021-03-12 | End: 2021-03-12 | Stop reason: HOSPADM

## 2021-03-12 RX ORDER — ACETAMINOPHEN 500 MG
1000 TABLET ORAL EVERY 4 HOURS PRN
Status: DISCONTINUED | OUTPATIENT
Start: 2021-03-12 | End: 2021-03-12

## 2021-03-12 RX ORDER — ACETAMINOPHEN 500 MG
1000 TABLET ORAL ONCE
Status: DISCONTINUED | OUTPATIENT
Start: 2021-03-12 | End: 2021-03-12 | Stop reason: HOSPADM

## 2021-03-12 RX ORDER — LABETALOL HYDROCHLORIDE 5 MG/ML
10 INJECTION, SOLUTION INTRAVENOUS ONCE
Status: COMPLETED | OUTPATIENT
Start: 2021-03-12 | End: 2021-03-12

## 2021-03-12 RX ORDER — LOSARTAN POTASSIUM AND HYDROCHLOROTHIAZIDE 12.5; 5 MG/1; MG/1
1 TABLET ORAL DAILY
Qty: 30 TABLET | Refills: 0 | Status: ON HOLD | OUTPATIENT
Start: 2021-03-12 | End: 2021-04-29

## 2021-03-12 RX ADMIN — HYDROCHLOROTHIAZIDE: 12.5 CAPSULE ORAL at 13:37

## 2021-03-12 RX ADMIN — LABETALOL HYDROCHLORIDE 10 MG: 5 INJECTION, SOLUTION INTRAVENOUS at 12:49

## 2021-03-12 ASSESSMENT — ENCOUNTER SYMPTOMS
SHORTNESS OF BREATH: 1
DIZZINESS: 1
ABDOMINAL PAIN: 0
HEADACHES: 1

## 2021-03-12 NOTE — ED NOTES
DATE:  3/12/2021   TIME OF RECEIPT FROM LAB:  1226  LAB TEST:  ETOH   LAB VALUE:  0.41  RESULTS GIVEN WITH READ-BACK TO (PROVIDER):  Dee Dee Foss, *  TIME LAB VALUE REPORTED TO PROVIDER:   1225

## 2021-03-12 NOTE — DISCHARGE INSTRUCTIONS
Recommend cutting back/stopping alcohol use  Restart the blood pressure medication and follow-up with primary care provider    Discharge Instructions  Alcohol Intoxication    You have been seen today with alcohol intoxication. This means that you have enough alcohol in your system to impair your ability to mentally and physically function, perhaps to the extent that you were unable to care for yourself.    Generally, every Emergency Department visit should have a follow-up clinic visit with either a primary or a specialty clinic/provider. Please follow-up as instructed by your emergency provider today.    You may have come to the Emergency Department because of your intoxication, or for another reason, such as because of an injury. No matter what the case is, this visit is a  red flag  regarding alcohol use, and you should consider whether your drinking pattern is a problem for you.     You may be at risk for alcohol-related problems if:    Men: you drink more than 14 drinks per week, or more than 4 drinks per occasion.    Women: you drink more than 7 drinks per week or more than 3 drinks per occasion.    You have black-outs.  You do things you regret while drinking.  You have legal problems because of drinking.  You have job problems because of drinking (you call in sick to work because of drinking).    CAGE Questions  Have you ever felt you should cut down on your drinking?  Have people annoyed you by criticizing your drinking?  Have you ever felt bad or guilty about your drinking?  Have you ever had a drink first thing in the morning to steady your nerves or get rid of a hangover (eye opener)?    If you answer yes to any of the CAGE questions, you may have a problem with alcohol.      Return to the Emergency Department if:  You become shaky or tremble when you try to stop drinking.   You have severe abdominal pain (belly pain).   You have a seizure or pass out.    You vomit (throw up) blood or have blood in your  stool. This may be bright red or it may look like black coffee grounds.  You become lightheaded or faint.      For further help, contact:   Your caregiver.    Alcoholics Anonymous (AA).    [facility]: [phone]  [facility] Central Office: [phone]   A drug or alcohol rehabilitation program.    You can get information on alcohol resources and groups by calling the number 211 or [phone] on any phone.     Seek medical care if:  You have persistent vomiting.   You have persistent pain in any part of your body.    You do not feel better after a few days.    If you were given a prescription for medicine here today, be sure to read all of the information (including the package insert) that comes with your prescription.  This will include important information about the medicine, its side effects, and any warnings that you need to know about.  The pharmacist who fills the prescription can provide more information and answer questions you may have about the medicine.  If you have questions or concerns that the pharmacist cannot address, please call or return to the Emergency Department.   Remember that you can always come back to the Emergency Department if you are not able to see your regular doctor in the amount of time listed above, if you get any new symptoms, or if there is anything that worries you.

## 2021-03-12 NOTE — ED PROVIDER NOTES
[facility] ED Mental Health Handoff Note:       Brief HPI:  This is a 50 year old female signed out to me by Dr. Foss.  See initial ED Provider note for full details of the presentation. Interval history is pertinent for seen by DEC.  Safety plan and therapy outpatient.      Home meds reviewed and ordered/administered: Yes    Medically stable for inpatient mental health admission: Yes.    Evaluated by mental health: No. Patient is clinically sober and awaiting evaluation for disposition.    Safety concerns: At the time I received sign out, there were no safety concerns.    Hold Status:  Active Orders   Legal    Health Officer Authority to Detain (GISEL)     Frequency: Effective Now     Start Date/Time: 03/12/21 1030      Number of Occurrences: Until Specified     Order Comments: This patient presented with an altered mental state that is suspected to be due to an intoxicating substance. The level of mentation is such that abuse of this substance is suspected. Given the patient's level of alteration and the circumstances in which the patient presented, it is likely that the patient utilizes intoxicating substances on a regular basis or has relapsed into utilizing them after a period of attempted sobriety. Given these circumstances, I am highly concerned that the patient has a problem with chemical dependency and cannot make safe decisions at this time. Currently, this endangers the patient's well-being and safety, and I am thus placing a Health Officer Authority hold upon the patient at this time.              Exam:   Patient Vitals for the past 24 hrs:   BP Temp Temp src Pulse Resp SpO2   03/12/21 1424 (!) 168/104 -- -- -- -- --   03/12/21 1337 (!) 143/86 -- -- -- -- --   03/12/21 1309 (!) 161/99 -- -- 74 -- --   03/12/21 1228 (!) 194/114 -- -- 82 -- --   03/12/21 1002 (!) 207/140 97  F (36.1  C) Temporal 88 20 98 %       ED Course:    Medications   artificial tears (GENTEAL) 0.1-0.2-0.3 % ophthalmic solution  1 drop (has no administration in time range)   acetaminophen (TYLENOL) tablet 1,000 mg (has no administration in time range)   labetalol (NORMODYNE/TRANDATE) injection 10 mg (10 mg Intravenous Given 3/12/21 1249)   losartan-hydrochlorothiazide (HYZAAR) 50-12.5 mg combo dose ( Oral Given 3/12/21 6087)            There were no significant events during my shift.    Patient was seen and evaluated by DEC.  Please see their note for further details.  There does not appear to be any need safety concerns but patient feels comfortable observing.  She is to find a safe ride home.  She appears to be clinically sober and able to discharge.  Previous provider has ordered home medications for blood pressure.  Patient has no other concerns at this time.  She wishes to leave.    Impression:    ICD-10-CM    1. Depression, unspecified depression type  F32.9    2. Benign essential hypertension  I10    3. Alcoholic intoxication without complication (H)  F10.920    4. Hematoma of frontal scalp, initial encounter  S00.03XA        Plan:    1. Discharged.      RESULTS:   Results for orders placed or performed during the hospital encounter of 03/12/21 (from the past 24 hour(s))   EKG 12-lead, tracing only     Status: None    Collection Time: 03/12/21 10:45 AM   Result Value Ref Range    Interpretation ECG Click View Image link to view waveform and result    CBC with platelets differential     Status: Abnormal    Collection Time: 03/12/21 11:34 AM   Result Value Ref Range    WBC 3.5 (L) 4.0 - 11.0 10e9/L    RBC Count 3.87 3.8 - 5.2 10e12/L    Hemoglobin 12.0 11.7 - 15.7 g/dL    Hematocrit 34.9 (L) 35.0 - 47.0 %    MCV 90 78 - 100 fl    MCH 31.0 26.5 - 33.0 pg    MCHC 34.4 31.5 - 36.5 g/dL    RDW 21.9 (H) 10.0 - 15.0 %    Platelet Count 311 150 - 450 10e9/L    Diff Method Automated Method     % Neutrophils 50.6 %    % Lymphocytes 35.1 %    % Monocytes 11.9 %    % Eosinophils 1.2 %    % Basophils 1.2 %    % Immature Granulocytes 0.0 %     Nucleated RBCs 0 0 /100    Absolute Neutrophil 1.8 1.6 - 8.3 10e9/L    Absolute Lymphocytes 1.2 0.8 - 5.3 10e9/L    Absolute Monocytes 0.4 0.0 - 1.3 10e9/L    Absolute Eosinophils 0.0 0.0 - 0.7 10e9/L    Absolute Basophils 0.0 0.0 - 0.2 10e9/L    Abs Immature Granulocytes 0.0 0 - 0.4 10e9/L    Absolute Nucleated RBC 0.0    Basic metabolic panel     Status: Abnormal    Collection Time: 03/12/21 11:34 AM   Result Value Ref Range    Sodium 143 133 - 144 mmol/L    Potassium 3.4 3.4 - 5.3 mmol/L    Chloride 108 94 - 109 mmol/L    Carbon Dioxide 28 20 - 32 mmol/L    Anion Gap 7 3 - 14 mmol/L    Glucose 87 70 - 99 mg/dL    Urea Nitrogen 11 7 - 30 mg/dL    Creatinine 0.51 (L) 0.52 - 1.04 mg/dL    GFR Estimate >90 >60 mL/min/[1.73_m2]    GFR Estimate If Black >90 >60 mL/min/[1.73_m2]    Calcium 8.8 8.5 - 10.1 mg/dL   Troponin I     Status: None    Collection Time: 03/12/21 11:34 AM   Result Value Ref Range    Troponin I ES <0.015 0.000 - 0.045 ug/L   Nt probnp inpatient (BNP)     Status: None    Collection Time: 03/12/21 11:34 AM   Result Value Ref Range    N-Terminal Pro BNP Inpatient 75 0 - 450 pg/mL   INR     Status: None    Collection Time: 03/12/21 11:34 AM   Result Value Ref Range    INR 0.93 0.86 - 1.14   Alcohol ethyl     Status: Abnormal    Collection Time: 03/12/21 11:34 AM   Result Value Ref Range    Ethanol g/dL 0.41 (HH) <0.01 g/dL   ISTAT HCG Quantitative Pregnancy POCT     Status: None    Collection Time: 03/12/21 11:36 AM   Result Value Ref Range    HCG Quantitative Serum <5.0 <5.0 IU/L   CT Head w/o Contrast     Status: None    Collection Time: 03/12/21 12:16 PM    Narrative    CT SCAN OF THE HEAD WITHOUT CONTRAST   3/12/2021 12:16 PM     HISTORY: Facial trauma.    TECHNIQUE: Axial images of the head and coronal reformations without  IV contrast material. Radiation dose for this scan was reduced using  automated exposure control, adjustment of the mA and/or kV according  to patient size, or iterative  reconstruction technique.    COMPARISON: Head CT 7/25/2020.    FINDINGS: The cerebral hemispheres, brainstem, and cerebellum  demonstrate normal morphology and attenuation. No evidence of acute  ischemia, hemorrhage, mass, mass effect or hydrocephalus. The  visualized calvarium, tympanic cavities, mastoid cavities, and  paranasal sinuses are unremarkable. Left frontal scalp contusion.      Impression    IMPRESSION:     1. No acute intracranial abnormality.  2. Left frontal scalp contusion.    STEVEN FULTON MD   CT Facial Bones without Contrast     Status: None    Collection Time: 03/12/21 12:16 PM    Narrative    CT FACIAL BONES WITHOUT CONTRAST  3/12/2021 12:16 PM     HISTORY: Facial trauma, fall.    TECHNIQUE: Axial CT images of the facial bones were completed with  sagittal and coronal reformations. Radiation dose for this scan was  reduced using automated exposure control, adjustment of the mA and/or  kV according to patient size, or iterative reconstruction technique.     COMPARISON: None.    FINDINGS: Left frontal scalp contusion. No evidence of underlying  fracture. Bilateral orbital proptosis related to prominent  intraorbital fat. Mild paranasal sinus mucosal thickening. Dental  disease with bilateral mandibular periapical abscesses.      Impression    IMPRESSION:   1. No evidence of fracture.  2. Left frontal contusion.    STEVEN FULTON MD   XR Chest 2 Views     Status: None    Collection Time: 03/12/21 12:16 PM    Narrative    CHEST TWO VIEWS  3/12/2021 12:16 PM     HISTORY: 50-year-old woman with chest pain.       Impression    IMPRESSION: Since 10/16/2020, heart size appears enlarged, though may  be due to AP technique. No pleural effusion or pneumothorax. Minimal  scattered opacities in both lung bases in the right middle lobe. This  may have been variably present on previous exam, though given  difference in technique, somewhat difficult to compare. Early  pneumonia is possible, although thought  to be less likely.    MD Steffi ALEJANDRO MD Bennett, Jennifer L, MD  03/12/21 [id number]

## 2021-03-12 NOTE — ED TRIAGE NOTES
"Pt arrives with multiple complaints. Pt intoxicated, states fell at work this morning and was dropped off by coworker. States \"can't deal with life anymore\". Pt states needs mental help. Pt is tearful and anxious in triage.  Pt has swollen L eye. Hypertensive in triage.   "

## 2021-03-12 NOTE — ED PROVIDER NOTES
"  History   Chief Complaint:  Multiple Complaints     HPI   Jose Corral is a 50 year old female with history of alcohol abuse, hypertension, and diabetes mellitus who presents with multiple complaints. Patient states she drank last night and has been drinking everyday. She denies a history of withdrawal. She has been dealing with family problems and feels a large amount of stress recently. She has been having suicidal ideation but no plan to execute. She states she had onset dizziness while walking to work this morning and describes it as \"seeing stars\". She fell while at work hit her head. She denies loss of consciousness but has a swollen left eye. In addition, she complains of a headache which has been ongoing for some time. She also complains of blurry vision and describes it as a \"going in and out\" feeling\" for a month. She has recently began smoking cigarettes again. She complains of chest pain and shortness of breath that are also chronic for her. Denies abdominal pain. No hx of DVT/PE. Is not taking the blood pressure medication anymore.    Review of Systems   Eyes: Positive for visual disturbance.        Swollen Left Eye      Respiratory: Positive for shortness of breath.    Cardiovascular: Positive for chest pain.   Gastrointestinal: Negative for abdominal pain.   Neurological: Positive for dizziness and headaches.   Psychiatric/Behavioral: Positive for suicidal ideas. Negative for self-injury.   All other systems reviewed and are negative.    Allergies:  No known drug allergies    Medications:  Lisinopril   Hyzarr     Past Medical History:    Alcohol Abuse   Diabetes Mellitus   Trichomonal Vaginitis   DUB  Carpal Tunnel Syndrome   Hypertension   Asthma      Social History:  Patient arrives unaccompanied from work.     Physical Exam     Patient Vitals for the past 24 hrs:   BP Temp Temp src Pulse Resp SpO2   03/12/21 1309 (!) 161/99 -- -- 74 -- --   03/12/21 1228 (!) 194/114 -- -- 82 -- -- "   03/12/21 1002 (!) 207/140 97  F (36.1  C) Temporal 88 20 98 %       Physical Exam    General: Sitting up in bed  Eyes:  The pupils are equal and round    EOMI    Mild swelling on left eyebrow     Conjunctivae and sclerae are normal  ENT:    Wearing a mask  Neck:  Normal range of motion, no neck tenderness, full ROM of neck  CV:  Regular rate, regular rhythm    Skin warm and well perfused   Resp:  Non labored breathing on room air    No tachypnea    No cough heard  GI:  Abdomen is soft, there is no rigidity    No distension    No rebound tenderness     No abdominal tenderness  MS:  Bilateral LE edema, symmetric  Skin:  No rash or acute skin lesions noted  Neuro:   Awake, alert.      Speech is normal and fluent.    Face is symmetric.     Moves all extremities equally    Gait steady  Psych: Tearful, appears anxious    Emergency Department Course   ECG  ECG taken at 1045, ECG read at 1053  Normal sinus rhythm   Minimal voltage criteria for LVH, may be normal variant   Septal infarct, age undetermined    Abnormal ECG   Rate 68 bpm. IA interval 172 ms. QRS duration 98 ms. QT/QTc 442/469 ms. P-R-T axes 52 20 39.     Imaging:  CT Head w/o Contrast:  1. No acute intracranial abnormality.  2. Left frontal scalp contusion.  Reading per radiology    XR Chest 2 Views:  Since 10/16/2020, heart size appears enlarged, though may  be due to AP technique. No pleural effusion or pneumothorax. Minimal  scattered opacities in both lung bases in the right middle lobe. This  may have been variably present on previous exam, though given  difference in technique, somewhat difficult to compare. Early  pneumonia is possible, although thought to be less likely.  Reading per radiology    CT Facial Bones w/o Contrast:  1. No evidence of fracture.  2. Left frontal contusion.  Reading per radiology    Laboratory:  CBC: WBC 3.5 (L), HGB 12.0,    BMP: Creatinine 0.51, o/w WNL    Troponin (Resulted 1212): <0.015  BNP: 75  INR:  0.93    Alcohol Ethyl: 0.41 (VH)    ISTAT Qualitative Pregnancy POCT: <5.0    Emergency Department Course:    Reviewed:  I reviewed nursing notes and past medical history    Assessments:  1025 I obtained history and examined the patient as noted above.   1304 I rechecked the patient and explained findings.     Interventions:  1249 Labetalol 10 mg IV   Pending Hyzaar 12.5 mg IV     Disposition:  Care of the patient was transferred to my colleague Dr. Marcos pending DEC assessment.       Impression & Plan     Medical Decision Making:  Jose Corral is a 50 year old female who presented to the ED with multiple complaints. Patient with fall while intoxicated. CT head and face show no fractures or intra-cranial pathology. No other injuries from fall. Is hypertensive which is a chronic problem for her and she is not taking the blood pressure medication as she reports she did not have money to fill prescription. Reports having money now to fill prescription. Has not follow-up with PCP. Troponin negative. Likely has leg swelling from uncontrolled hypertension. Doubt DVT. I do not that she had leg swelling in December as well and had negative leg ultrasounds done at that time. Don't think repeat ultrasound indicated. Chest xray with mild cardiomegaly likely from uncontrolled hypertension. Doubt pneumonia based on symptoms. Discussed these findings with her and she wants to leave to go home. Do think she needs DEC assessment once clinically sober as she does endorse depression and passive suicidal ideation. Patient signed out to Dr. Marcos pending DEC assessment and likely home after this. Will refill the blood pressure medication started in December and gave her resources for PCP follow-up.    Diagnosis:    ICD-10-CM    1. Depression, unspecified depression type  F32.9    2. Benign essential hypertension  I10    3. Alcoholic intoxication without complication (H)  F10.920    4. Hematoma of frontal scalp, initial  encounter  S00.03XA      Discharge Medications:  Current Discharge Medication List          Scribe Disclosure:  I, Miguel Ribeiro, am serving as a scribe at 10:10 AM on 3/12/2021 to document services personally performed by Dee Dee Foss MD based on my observations and the provider's statements to me.              Dee Dee Foss MD  03/12/21 [id number]

## 2021-04-27 ENCOUNTER — APPOINTMENT (OUTPATIENT)
Dept: CT IMAGING | Facility: CLINIC | Age: 51
DRG: 305 | End: 2021-04-27
Attending: EMERGENCY MEDICINE

## 2021-04-27 ENCOUNTER — TRANSFERRED RECORDS (OUTPATIENT)
Dept: HEALTH INFORMATION MANAGEMENT | Facility: CLINIC | Age: 51
End: 2021-04-27

## 2021-04-27 ENCOUNTER — HOSPITAL ENCOUNTER (INPATIENT)
Facility: CLINIC | Age: 51
LOS: 2 days | Discharge: LEFT BEFORE DISCHARGE | DRG: 305 | End: 2021-04-29
Attending: EMERGENCY MEDICINE | Admitting: INTERNAL MEDICINE

## 2021-04-27 DIAGNOSIS — R07.9 CHEST PAIN, UNSPECIFIED TYPE: ICD-10-CM

## 2021-04-27 DIAGNOSIS — I16.0 HYPERTENSIVE URGENCY: ICD-10-CM

## 2021-04-27 DIAGNOSIS — R05.9 COUGH: ICD-10-CM

## 2021-04-27 DIAGNOSIS — R45.851 SUICIDAL IDEATION: ICD-10-CM

## 2021-04-27 DIAGNOSIS — R00.2 PALPITATIONS: ICD-10-CM

## 2021-04-27 DIAGNOSIS — F32.A DEPRESSION, UNSPECIFIED DEPRESSION TYPE: ICD-10-CM

## 2021-04-27 LAB
ALBUMIN SERPL-MCNC: 3.5 G/DL (ref 3.4–5)
ALBUMIN UR-MCNC: 100 MG/DL
ALP SERPL-CCNC: 84 U/L (ref 40–150)
ALT SERPL W P-5'-P-CCNC: 33 U/L (ref 0–50)
ANION GAP SERPL CALCULATED.3IONS-SCNC: 7 MMOL/L (ref 3–14)
APPEARANCE UR: ABNORMAL
AST SERPL W P-5'-P-CCNC: 52 U/L (ref 0–45)
BACTERIA #/AREA URNS HPF: ABNORMAL /HPF
BASOPHILS # BLD AUTO: 0.1 10E9/L (ref 0–0.2)
BASOPHILS NFR BLD AUTO: 1.8 %
BILIRUB SERPL-MCNC: 0.2 MG/DL (ref 0.2–1.3)
BILIRUB UR QL STRIP: NEGATIVE
BUN SERPL-MCNC: 10 MG/DL (ref 7–30)
CALCIUM SERPL-MCNC: 8.3 MG/DL (ref 8.5–10.1)
CHLORIDE SERPL-SCNC: 109 MMOL/L (ref 94–109)
CO2 SERPL-SCNC: 28 MMOL/L (ref 20–32)
COLOR UR AUTO: YELLOW
CREAT SERPL-MCNC: 0.59 MG/DL (ref 0.52–1.04)
D DIMER PPP FEU-MCNC: 1 UG/ML FEU (ref 0–0.5)
DIFFERENTIAL METHOD BLD: ABNORMAL
EOSINOPHIL # BLD AUTO: 0.1 10E9/L (ref 0–0.7)
EOSINOPHIL NFR BLD AUTO: 1.3 %
ERYTHROCYTE [DISTWIDTH] IN BLOOD BY AUTOMATED COUNT: 20.7 % (ref 10–15)
ETHANOL SERPL-MCNC: 0.46 G/DL
FLUAV RNA RESP QL NAA+PROBE: NEGATIVE
FLUBV RNA RESP QL NAA+PROBE: NEGATIVE
GFR SERPL CREATININE-BSD FRML MDRD: >90 ML/MIN/{1.73_M2}
GLUCOSE BLDC GLUCOMTR-MCNC: 103 MG/DL (ref 70–99)
GLUCOSE SERPL-MCNC: 87 MG/DL (ref 70–99)
GLUCOSE UR STRIP-MCNC: NEGATIVE MG/DL
HCT VFR BLD AUTO: 37.2 % (ref 35–47)
HGB BLD-MCNC: 12.6 G/DL (ref 11.7–15.7)
HGB UR QL STRIP: ABNORMAL
IMM GRANULOCYTES # BLD: 0 10E9/L (ref 0–0.4)
IMM GRANULOCYTES NFR BLD: 0.5 %
KETONES UR STRIP-MCNC: ABNORMAL MG/DL
LABORATORY COMMENT REPORT: NORMAL
LEUKOCYTE ESTERASE UR QL STRIP: ABNORMAL
LYMPHOCYTES # BLD AUTO: 1.9 10E9/L (ref 0.8–5.3)
LYMPHOCYTES NFR BLD AUTO: 49.5 %
MCH RBC QN AUTO: 31.5 PG (ref 26.5–33)
MCHC RBC AUTO-ENTMCNC: 33.9 G/DL (ref 31.5–36.5)
MCV RBC AUTO: 93 FL (ref 78–100)
MONOCYTES # BLD AUTO: 0.4 10E9/L (ref 0–1.3)
MONOCYTES NFR BLD AUTO: 10 %
MUCOUS THREADS #/AREA URNS LPF: PRESENT /LPF
NEUTROPHILS # BLD AUTO: 1.4 10E9/L (ref 1.6–8.3)
NEUTROPHILS NFR BLD AUTO: 36.9 %
NITRATE UR QL: NEGATIVE
NRBC # BLD AUTO: 0 10*3/UL
NRBC BLD AUTO-RTO: 0 /100
NT-PROBNP SERPL-MCNC: 65 PG/ML (ref 0–450)
PH UR STRIP: 5.5 PH (ref 5–7)
PLATELET # BLD AUTO: 292 10E9/L (ref 150–450)
POTASSIUM SERPL-SCNC: 3.2 MMOL/L (ref 3.4–5.3)
PROT SERPL-MCNC: 8.2 G/DL (ref 6.8–8.8)
RBC # BLD AUTO: 4 10E12/L (ref 3.8–5.2)
RBC #/AREA URNS AUTO: 3 /HPF (ref 0–2)
RSV RNA SPEC QL NAA+PROBE: NORMAL
SARS-COV-2 RNA RESP QL NAA+PROBE: NEGATIVE
SODIUM SERPL-SCNC: 144 MMOL/L (ref 133–144)
SOURCE: ABNORMAL
SP GR UR STRIP: 1.03 (ref 1–1.03)
SPECIMEN SOURCE: NORMAL
SQUAMOUS #/AREA URNS AUTO: 10 /HPF (ref 0–1)
TROPONIN I SERPL-MCNC: <0.015 UG/L (ref 0–0.04)
TSH SERPL DL<=0.005 MIU/L-ACNC: 1.3 MU/L (ref 0.4–4)
UROBILINOGEN UR STRIP-MCNC: NORMAL MG/DL (ref 0–2)
WBC # BLD AUTO: 3.8 10E9/L (ref 4–11)
WBC #/AREA URNS AUTO: 8 /HPF (ref 0–5)

## 2021-04-27 PROCEDURE — 83036 HEMOGLOBIN GLYCOSYLATED A1C: CPT | Performed by: EMERGENCY MEDICINE

## 2021-04-27 PROCEDURE — 84484 ASSAY OF TROPONIN QUANT: CPT | Performed by: EMERGENCY MEDICINE

## 2021-04-27 PROCEDURE — 999N001017 HC STATISTIC GLUCOSE BY METER IP

## 2021-04-27 PROCEDURE — 99223 1ST HOSP IP/OBS HIGH 75: CPT | Mod: AI | Performed by: INTERNAL MEDICINE

## 2021-04-27 PROCEDURE — 250N000011 HC RX IP 250 OP 636: Performed by: EMERGENCY MEDICINE

## 2021-04-27 PROCEDURE — 93005 ELECTROCARDIOGRAM TRACING: CPT

## 2021-04-27 PROCEDURE — 84443 ASSAY THYROID STIM HORMONE: CPT | Performed by: EMERGENCY MEDICINE

## 2021-04-27 PROCEDURE — 120N000001 HC R&B MED SURG/OB

## 2021-04-27 PROCEDURE — HZ2ZZZZ DETOXIFICATION SERVICES FOR SUBSTANCE ABUSE TREATMENT: ICD-10-PCS | Performed by: INTERNAL MEDICINE

## 2021-04-27 PROCEDURE — 99285 EMERGENCY DEPT VISIT HI MDM: CPT | Mod: 25

## 2021-04-27 PROCEDURE — 80307 DRUG TEST PRSMV CHEM ANLYZR: CPT | Performed by: INTERNAL MEDICINE

## 2021-04-27 PROCEDURE — 87636 SARSCOV2 & INF A&B AMP PRB: CPT | Performed by: EMERGENCY MEDICINE

## 2021-04-27 PROCEDURE — 250N000009 HC RX 250: Performed by: EMERGENCY MEDICINE

## 2021-04-27 PROCEDURE — 85025 COMPLETE CBC W/AUTO DIFF WBC: CPT | Performed by: EMERGENCY MEDICINE

## 2021-04-27 PROCEDURE — C9803 HOPD COVID-19 SPEC COLLECT: HCPCS

## 2021-04-27 PROCEDURE — 250N000011 HC RX IP 250 OP 636: Performed by: INTERNAL MEDICINE

## 2021-04-27 PROCEDURE — 71275 CT ANGIOGRAPHY CHEST: CPT

## 2021-04-27 PROCEDURE — 82077 ASSAY SPEC XCP UR&BREATH IA: CPT | Performed by: EMERGENCY MEDICINE

## 2021-04-27 PROCEDURE — 81001 URINALYSIS AUTO W/SCOPE: CPT | Performed by: EMERGENCY MEDICINE

## 2021-04-27 PROCEDURE — 83880 ASSAY OF NATRIURETIC PEPTIDE: CPT | Performed by: EMERGENCY MEDICINE

## 2021-04-27 PROCEDURE — 80053 COMPREHEN METABOLIC PANEL: CPT | Performed by: EMERGENCY MEDICINE

## 2021-04-27 PROCEDURE — 250N000013 HC RX MED GY IP 250 OP 250 PS 637: Performed by: INTERNAL MEDICINE

## 2021-04-27 PROCEDURE — 85379 FIBRIN DEGRADATION QUANT: CPT | Performed by: EMERGENCY MEDICINE

## 2021-04-27 RX ORDER — MULTIPLE VITAMINS W/ MINERALS TAB 9MG-400MCG
1 TAB ORAL DAILY
Status: DISCONTINUED | OUTPATIENT
Start: 2021-04-28 | End: 2021-04-29 | Stop reason: HOSPADM

## 2021-04-27 RX ORDER — PANTOPRAZOLE SODIUM 40 MG/1
40 TABLET, DELAYED RELEASE ORAL 2 TIMES DAILY
Status: DISCONTINUED | OUTPATIENT
Start: 2021-04-27 | End: 2021-04-29 | Stop reason: HOSPADM

## 2021-04-27 RX ORDER — LORAZEPAM 2 MG/ML
1-2 INJECTION INTRAMUSCULAR EVERY 30 MIN PRN
Status: DISCONTINUED | OUTPATIENT
Start: 2021-04-27 | End: 2021-04-29

## 2021-04-27 RX ORDER — POLYETHYLENE GLYCOL 3350 17 G/17G
17 POWDER, FOR SOLUTION ORAL DAILY PRN
Status: DISCONTINUED | OUTPATIENT
Start: 2021-04-27 | End: 2021-04-29 | Stop reason: HOSPADM

## 2021-04-27 RX ORDER — LABETALOL HYDROCHLORIDE 5 MG/ML
10 INJECTION, SOLUTION INTRAVENOUS
Status: DISCONTINUED | OUTPATIENT
Start: 2021-04-27 | End: 2021-04-28

## 2021-04-27 RX ORDER — LOSARTAN POTASSIUM 50 MG/1
50 TABLET ORAL DAILY
Status: DISCONTINUED | OUTPATIENT
Start: 2021-04-28 | End: 2021-04-28

## 2021-04-27 RX ORDER — LABETALOL HYDROCHLORIDE 5 MG/ML
10 INJECTION, SOLUTION INTRAVENOUS ONCE
Status: DISCONTINUED | OUTPATIENT
Start: 2021-04-27 | End: 2021-04-27

## 2021-04-27 RX ORDER — LIDOCAINE 40 MG/G
CREAM TOPICAL
Status: DISCONTINUED | OUTPATIENT
Start: 2021-04-27 | End: 2021-04-29 | Stop reason: HOSPADM

## 2021-04-27 RX ORDER — AMOXICILLIN 250 MG
2 CAPSULE ORAL 2 TIMES DAILY PRN
Status: DISCONTINUED | OUTPATIENT
Start: 2021-04-27 | End: 2021-04-29 | Stop reason: HOSPADM

## 2021-04-27 RX ORDER — NICOTINE POLACRILEX 4 MG
15-30 LOZENGE BUCCAL
Status: DISCONTINUED | OUTPATIENT
Start: 2021-04-27 | End: 2021-04-29 | Stop reason: HOSPADM

## 2021-04-27 RX ORDER — LANOLIN ALCOHOL/MO/W.PET/CERES
200 CREAM (GRAM) TOPICAL 3 TIMES DAILY
Status: DISCONTINUED | OUTPATIENT
Start: 2021-04-27 | End: 2021-04-29 | Stop reason: HOSPADM

## 2021-04-27 RX ORDER — FOLIC ACID 1 MG/1
1 TABLET ORAL DAILY
Status: DISCONTINUED | OUTPATIENT
Start: 2021-04-28 | End: 2021-04-29 | Stop reason: HOSPADM

## 2021-04-27 RX ORDER — ACETAMINOPHEN 325 MG/1
650 TABLET ORAL EVERY 4 HOURS PRN
Status: DISCONTINUED | OUTPATIENT
Start: 2021-04-27 | End: 2021-04-29 | Stop reason: HOSPADM

## 2021-04-27 RX ORDER — DEXTROSE MONOHYDRATE 25 G/50ML
25-50 INJECTION, SOLUTION INTRAVENOUS
Status: DISCONTINUED | OUTPATIENT
Start: 2021-04-27 | End: 2021-04-29 | Stop reason: HOSPADM

## 2021-04-27 RX ORDER — ASPIRIN 81 MG/1
81 TABLET, CHEWABLE ORAL DAILY
Status: DISCONTINUED | OUTPATIENT
Start: 2021-04-28 | End: 2021-04-29 | Stop reason: HOSPADM

## 2021-04-27 RX ORDER — SODIUM CHLORIDE AND POTASSIUM CHLORIDE 150; 900 MG/100ML; MG/100ML
INJECTION, SOLUTION INTRAVENOUS CONTINUOUS
Status: DISCONTINUED | OUTPATIENT
Start: 2021-04-27 | End: 2021-04-28

## 2021-04-27 RX ORDER — LANOLIN ALCOHOL/MO/W.PET/CERES
100 CREAM (GRAM) TOPICAL DAILY
Status: DISCONTINUED | OUTPATIENT
Start: 2021-05-05 | End: 2021-04-29 | Stop reason: HOSPADM

## 2021-04-27 RX ORDER — LANOLIN ALCOHOL/MO/W.PET/CERES
100 CREAM (GRAM) TOPICAL 3 TIMES DAILY
Status: DISCONTINUED | OUTPATIENT
Start: 2021-04-29 | End: 2021-04-29 | Stop reason: HOSPADM

## 2021-04-27 RX ORDER — SODIUM CHLORIDE AND POTASSIUM CHLORIDE 150; 900 MG/100ML; MG/100ML
INJECTION, SOLUTION INTRAVENOUS CONTINUOUS
Status: DISCONTINUED | OUTPATIENT
Start: 2021-04-27 | End: 2021-04-27

## 2021-04-27 RX ORDER — ONDANSETRON 2 MG/ML
4 INJECTION INTRAMUSCULAR; INTRAVENOUS EVERY 6 HOURS PRN
Status: DISCONTINUED | OUTPATIENT
Start: 2021-04-27 | End: 2021-04-29 | Stop reason: HOSPADM

## 2021-04-27 RX ORDER — FLUMAZENIL 0.1 MG/ML
0.2 INJECTION, SOLUTION INTRAVENOUS
Status: DISCONTINUED | OUTPATIENT
Start: 2021-04-27 | End: 2021-04-29 | Stop reason: HOSPADM

## 2021-04-27 RX ORDER — IOPAMIDOL 755 MG/ML
500 INJECTION, SOLUTION INTRAVASCULAR ONCE
Status: COMPLETED | OUTPATIENT
Start: 2021-04-27 | End: 2021-04-27

## 2021-04-27 RX ORDER — ASPIRIN 81 MG/1
81 TABLET, CHEWABLE ORAL DAILY
COMMUNITY
End: 2022-03-26

## 2021-04-27 RX ORDER — HYDROCHLOROTHIAZIDE 12.5 MG/1
12.5 CAPSULE ORAL DAILY
Status: DISCONTINUED | OUTPATIENT
Start: 2021-04-28 | End: 2021-04-28

## 2021-04-27 RX ORDER — ACETAMINOPHEN 650 MG/1
650 SUPPOSITORY RECTAL EVERY 4 HOURS PRN
Status: DISCONTINUED | OUTPATIENT
Start: 2021-04-27 | End: 2021-04-29 | Stop reason: HOSPADM

## 2021-04-27 RX ORDER — SODIUM CHLORIDE 9 MG/ML
INJECTION, SOLUTION INTRAVENOUS CONTINUOUS
Status: CANCELLED | OUTPATIENT
Start: 2021-04-27

## 2021-04-27 RX ORDER — OLANZAPINE 5 MG/1
5-10 TABLET, ORALLY DISINTEGRATING ORAL EVERY 6 HOURS PRN
Status: DISCONTINUED | OUTPATIENT
Start: 2021-04-27 | End: 2021-04-29 | Stop reason: HOSPADM

## 2021-04-27 RX ORDER — LOSARTAN POTASSIUM AND HYDROCHLOROTHIAZIDE 12.5; 5 MG/1; MG/1
1 TABLET ORAL DAILY
Status: DISCONTINUED | OUTPATIENT
Start: 2021-04-28 | End: 2021-04-27 | Stop reason: CLARIF

## 2021-04-27 RX ORDER — AMOXICILLIN 250 MG
1 CAPSULE ORAL 2 TIMES DAILY PRN
Status: DISCONTINUED | OUTPATIENT
Start: 2021-04-27 | End: 2021-04-29 | Stop reason: HOSPADM

## 2021-04-27 RX ORDER — IBUPROFEN 600 MG/1
600 TABLET, FILM COATED ORAL EVERY 6 HOURS PRN
Status: DISCONTINUED | OUTPATIENT
Start: 2021-04-27 | End: 2021-04-28

## 2021-04-27 RX ORDER — POTASSIUM CHLORIDE 1.5 G/1.58G
40 POWDER, FOR SOLUTION ORAL ONCE
Status: COMPLETED | OUTPATIENT
Start: 2021-04-27 | End: 2021-04-27

## 2021-04-27 RX ORDER — LORAZEPAM 1 MG/1
1-2 TABLET ORAL EVERY 30 MIN PRN
Status: DISCONTINUED | OUTPATIENT
Start: 2021-04-27 | End: 2021-04-29

## 2021-04-27 RX ORDER — HALOPERIDOL 5 MG/ML
2.5-5 INJECTION INTRAMUSCULAR EVERY 6 HOURS PRN
Status: DISCONTINUED | OUTPATIENT
Start: 2021-04-27 | End: 2021-04-29 | Stop reason: HOSPADM

## 2021-04-27 RX ORDER — ONDANSETRON 4 MG/1
4 TABLET, ORALLY DISINTEGRATING ORAL EVERY 6 HOURS PRN
Status: DISCONTINUED | OUTPATIENT
Start: 2021-04-27 | End: 2021-04-29 | Stop reason: HOSPADM

## 2021-04-27 RX ORDER — HYDRALAZINE HYDROCHLORIDE 20 MG/ML
10 INJECTION INTRAMUSCULAR; INTRAVENOUS EVERY 4 HOURS PRN
Status: DISCONTINUED | OUTPATIENT
Start: 2021-04-27 | End: 2021-04-29 | Stop reason: HOSPADM

## 2021-04-27 RX ADMIN — IOPAMIDOL 68 ML: 755 INJECTION, SOLUTION INTRAVENOUS at 19:20

## 2021-04-27 RX ADMIN — HYDRALAZINE HYDROCHLORIDE 10 MG: 20 INJECTION INTRAMUSCULAR; INTRAVENOUS at 22:27

## 2021-04-27 RX ADMIN — PANTOPRAZOLE SODIUM 40 MG: 40 TABLET, DELAYED RELEASE ORAL at 22:31

## 2021-04-27 RX ADMIN — SODIUM CHLORIDE 87 ML: 9 INJECTION, SOLUTION INTRAVENOUS at 19:20

## 2021-04-27 RX ADMIN — POTASSIUM CHLORIDE 40 MEQ: 1.5 POWDER, FOR SOLUTION ORAL at 22:58

## 2021-04-27 RX ADMIN — POTASSIUM CHLORIDE AND SODIUM CHLORIDE: 900; 150 INJECTION, SOLUTION INTRAVENOUS at 22:38

## 2021-04-27 RX ADMIN — THIAMINE HCL TAB 100 MG 200 MG: 100 TAB at 22:31

## 2021-04-27 RX ADMIN — LABETALOL HYDROCHLORIDE 10 MG: 5 INJECTION, SOLUTION INTRAVENOUS at 23:25

## 2021-04-27 ASSESSMENT — MIFFLIN-ST. JEOR: SCORE: 1475.92

## 2021-04-27 ASSESSMENT — ENCOUNTER SYMPTOMS
FEVER: 0
NERVOUS/ANXIOUS: 1
COUGH: 1
DYSURIA: 1

## 2021-04-27 NOTE — ED PROVIDER NOTES
History   Chief Complaint:  Chest Pain, Cough, and Suicidal     History limited due to intoxication.    JOE Corral is a 50 year old female with history of hypertension, asthma, and diabetes who presents with chest pain and cough. Jose has been having a cough that started in the past couple weeks and palpitations for several days, which progressed more in the past two days. She states that she does not want to take meds for anxiety, despite feeling anxious. The patient did also report having chronic leg swelling and a bruise to her lower right leg, as well as chronic intermittent headaches. She does smoke and drink alcohol, and had a drink last night. Jose denies history of high blood pressure, high cholesterol, and diabetes, as well as having a fever. She denies urinary complications, as well as any history of heart attacks or stents. She denies active suicidal thoughts, but does agree that she has had vague thoughts of suicide recently.     Review of Systems   Constitutional: Negative for fever.   Respiratory: Positive for cough.    Cardiovascular: Positive for chest pain.   Genitourinary: Positive for dysuria (any urinary complications).   Psychiatric/Behavioral: Negative for suicidal ideas. The patient is nervous/anxious.    All other systems reviewed and are negative.      Allergies:  No known allergies.    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    Physical Exam     Patient Vitals for the past 24 hrs:   BP Temp Pulse Resp SpO2   04/27/21 2015 (!) 143/84 -- 81 -- 94 %   04/27/21 2005 (!) 157/103  -- 82 -- --   04/27/21 1939 (!) 178/105 -- 83 -- 95 %   04/27/21 1900 (!) 179/129 -- 87 -- 97 %   04/27/21 1657 (!) 220/132 -- -- -- --   04/27/21 1656 -- 97.3  F (36.3  C) 93 28 98 %       Physical Exam  General: Intoxicated, appears well-developed and well-nourished. Minimaly cooperative.     In moderate distress, anxious, tearful  HEENT:  Head:  Atraumatic  Ears:  External ears are normal  Mouth/Throat:  Oropharynx is without erythema or exudate and mucous membranes are moist.   Eyes:   Conjunctivae normal and EOM are normal. No scleral icterus.  CV:  Normal rate, regular rhythm, normal heart sounds and radial pulses are 2+ and symmetric.  No murmur.  Resp:  Breath sounds are clear bilaterally    Non-labored, no retractions or accessory muscle use  GI:  Obese.  Abdomen is soft, no distension, no tenderness. No rebound or guarding.  No CVA tenderness bilaterally  MS:  Normal range of motion. No edema.    Normal strength in all 4 extremities.     Back atraumatic.    No midline cervical, thoracic, or lumbar tenderness  Skin:  Warm and dry.  Faint bruising to the medial aspect of the left ankle/lower leg.  No tenderness to palpation.  No rash or lesions noted.  Neuro: Intoxicated. Normal strength.  GCS: 14  Psych:  Depressed mood and affect.  Seems very anxious.  Vague suicidal thoughts, but tells me she is not suicidal right now.  Tearful.     Emergency Department Course   ECG  ECG taken at 1828, ECG read at 1836  Normal sinus rhythm  Possible Left atrial enlargement  Left ventricular hypertrophy  Cannot rule out Septal infarct, age undetermined   No significant change as compared to prior, dated 3/12/21.  Rate 83 bpm. OR interval 156 ms. QRS duration 96 ms. QT/QTc 412/484 ms. P-R-T axes 44 19 21.     Imaging:  CT Chest Pulmonary Embolism w Contrast  1.  No evidence for pulmonary embolism.   2.  Cardiomegaly.  3.  Pulmonary arterial hypertension.  4.  Possible esophagitis, please correlate clinically.    Reading per  radiology.    Laboratory:  CBC: WBC 3.8 (L), HGB 12.6,    CMP: Potassium 3.2 (L), Calcium 8.3 (L), AST 52 (H) o/w WNL (Creatinine 0.59)     Ddimer: 1.0 (H)  Troponin (Collected 1751): <0.015  TSH: 1.30    BNP: 65  Alcohol ethyl: 0.46 (H)    UA with microscopic: Ketone trace (A), Blood small (A), Protein albumin 100 (A), Leukocyte esterase small (A), WBC/HPF 8 (H), RBC/HPF 3 (H), Bacteria few (A), Squamous epithelial/HPF 10 (H), Mucous present (A) o/w WNL     Symptomatic Influenza A/B & SARS-CoV2 (COVID19) Virus PCR Multiplex: AWNL      Emergency Department Course:    Reviewed:  I reviewed nursing notes, vitals, past medical history and care everywhere    Assessments:  1712 I obtained history and examined the patient as noted above.   1905 I rechecked the patient and explained findings.     Consults:   2012 I consulted Dr. Shon Rajput of the hospitalist service. They agree to accept care of the patient.    Interventions:   Labetalol 10 mg IV   NS 1000 mL IV    Disposition:  The patient was admitted to the hospital under the care of Dr. Shon Rajput.       Impression & Plan   CMS Diagnoses: None    Medical Decision Making:  Jose Corral is a 50-year-old female with past medical history of diabetes and hypertension who presents with hypertension, palpitations, chest pain, cough, shortness of breath, and suicidal thoughts.  Unfortunately, patient is quite intoxicated this evening with a blood alcohol of 0.46 and challenging to interpret her clinical history over recent days.  I do suspect she has been having ongoing palpitations or chest discomfort, I am just unclear of the timing of her symptoms.  She is quite hypertensive here, with her initial blood pressure of 220/132 on arrival.  We did provide a single dose of IV labetalol and IV fluids here due to suspected hypertensive urgency.  Reassuringly, there have been no signs or evidence of end-organ damage.  EKG shows no concerning ischemic changes and troponin  initially is undetectable; lower concern for ACS.  D-dimer was elevated and due to persistence of cough as well as chest discomfort, we did send for CT imaging to evaluate for pulmonary embolism.  Reassuringly, no signs of pulmonary embolism on CT.  There was noted cardiomegaly and likely pulmonary arterial hypertension.  Patient self reports that she is not on blood pressure medications, but I do feel that she likely would need to be on long-term blood pressure management.  There is also a triage report that she had been suicidal, but did not further explore these symptoms as she was very vague about any depression or suicidal thoughts when I spoke with her at bedside.  Due to concerns for hypertensive urgency, depression and/or suicidal thoughts, as well as ongoing palpitations and chest discomfort I would like to admit the patient for further rule out for ACS as well as further evaluation and treatment of her hypertension.  Lastly she will likely need psychiatry evaluation once appropriately sober to better assess her depression and/or suicidal thoughts.  Patient case discussed with Dr. Rajput who agreed to admission.    Covid-19  Jose Corral was evaluated during a global COVID-19 pandemic, which necessitated consideration that the patient might be at risk for infection with the SARS-CoV-2 virus that causes COVID-19.   Applicable protocols for evaluation were followed during the patient's care.   COVID-19 was considered as part of the patient's evaluation. The plan for testing is:  a test was obtained during this visit.    Diagnosis:    ICD-10-CM    1. Hypertensive urgency  I16.0    2. Cough  R05    3. Palpitations  R00.2    4. Chest pain, unspecified type  R07.9    5. Suicidal ideation  R45.851    6. Depression, unspecified depression type  F32.9        Scribe Disclosure:  I, Osmin Weaver, am serving as a scribe at 5:02 PM on 4/27/2021 to document services personally performed by Javier Graff MD based on my  observations and the provider's statements to me.     Scribe Disclosure:  I, Sylwia Adilia, am serving as a scribe at 8:17 PM on 4/27/2021 to document services personally performed by Javier Graff MD based on my observations and the provider's statements to me.           Javier Graff MD  04/27/21 [id number]

## 2021-04-27 NOTE — ED TRIAGE NOTES
"When initially screened for depression and thoughts of suicide, patient screens positive. Patient then states, \"Wait! Chart no. It's nothing I can't handle.\" Reports has missed a mental health appointment that was set up due to suicidal comments.   "

## 2021-04-27 NOTE — ED TRIAGE NOTES
Patient sent to the ED from urgent care. Patient states she coughed at work and was sent home due to COVID concerns. Patient is very tearful and anxious about this and states that she has allergies. Patient additionally reports has been having some chest pain, which she attributes to anxiety.

## 2021-04-28 ENCOUNTER — APPOINTMENT (OUTPATIENT)
Dept: CARDIOLOGY | Facility: CLINIC | Age: 51
DRG: 305 | End: 2021-04-28
Attending: INTERNAL MEDICINE

## 2021-04-28 LAB
AMPHETAMINES UR QL SCN: NEGATIVE
ANION GAP SERPL CALCULATED.3IONS-SCNC: 10 MMOL/L (ref 3–14)
BARBITURATES UR QL: NEGATIVE
BENZODIAZ UR QL: NEGATIVE
BUN SERPL-MCNC: 8 MG/DL (ref 7–30)
CALCIUM SERPL-MCNC: 8.5 MG/DL (ref 8.5–10.1)
CANNABINOIDS UR QL SCN: NEGATIVE
CHLORIDE SERPL-SCNC: 111 MMOL/L (ref 94–109)
CO2 SERPL-SCNC: 23 MMOL/L (ref 20–32)
COCAINE UR QL: NEGATIVE
CREAT SERPL-MCNC: 0.51 MG/DL (ref 0.52–1.04)
ERYTHROCYTE [DISTWIDTH] IN BLOOD BY AUTOMATED COUNT: 20.8 % (ref 10–15)
GFR SERPL CREATININE-BSD FRML MDRD: >90 ML/MIN/{1.73_M2}
GLUCOSE BLDC GLUCOMTR-MCNC: 102 MG/DL (ref 70–99)
GLUCOSE BLDC GLUCOMTR-MCNC: 102 MG/DL (ref 70–99)
GLUCOSE BLDC GLUCOMTR-MCNC: 105 MG/DL (ref 70–99)
GLUCOSE BLDC GLUCOMTR-MCNC: 117 MG/DL (ref 70–99)
GLUCOSE BLDC GLUCOMTR-MCNC: 75 MG/DL (ref 70–99)
GLUCOSE BLDC GLUCOMTR-MCNC: 76 MG/DL (ref 70–99)
GLUCOSE BLDC GLUCOMTR-MCNC: 88 MG/DL (ref 70–99)
GLUCOSE BLDC GLUCOMTR-MCNC: 93 MG/DL (ref 70–99)
GLUCOSE SERPL-MCNC: 75 MG/DL (ref 70–99)
HBA1C MFR BLD: 5.2 % (ref 0–5.6)
HCT VFR BLD AUTO: 32.3 % (ref 35–47)
HGB BLD-MCNC: 11 G/DL (ref 11.7–15.7)
INTERPRETATION ECG - MUSE: NORMAL
LACTATE BLD-SCNC: 1.7 MMOL/L (ref 0.7–2)
LACTATE BLD-SCNC: 3.6 MMOL/L (ref 0.7–2)
MAGNESIUM SERPL-MCNC: 1.6 MG/DL (ref 1.6–2.3)
MCH RBC QN AUTO: 31.3 PG (ref 26.5–33)
MCHC RBC AUTO-ENTMCNC: 34.1 G/DL (ref 31.5–36.5)
MCV RBC AUTO: 92 FL (ref 78–100)
OPIATES UR QL SCN: NEGATIVE
PCP UR QL SCN: NEGATIVE
PLATELET # BLD AUTO: 269 10E9/L (ref 150–450)
POTASSIUM SERPL-SCNC: 3.5 MMOL/L (ref 3.4–5.3)
POTASSIUM SERPL-SCNC: 3.8 MMOL/L (ref 3.4–5.3)
RBC # BLD AUTO: 3.51 10E12/L (ref 3.8–5.2)
SODIUM SERPL-SCNC: 144 MMOL/L (ref 133–144)
TROPONIN I SERPL-MCNC: <0.015 UG/L (ref 0–0.04)
WBC # BLD AUTO: 3.8 10E9/L (ref 4–11)

## 2021-04-28 PROCEDURE — 250N000013 HC RX MED GY IP 250 OP 250 PS 637: Performed by: INTERNAL MEDICINE

## 2021-04-28 PROCEDURE — 36415 COLL VENOUS BLD VENIPUNCTURE: CPT | Performed by: HOSPITALIST

## 2021-04-28 PROCEDURE — 84132 ASSAY OF SERUM POTASSIUM: CPT | Performed by: INTERNAL MEDICINE

## 2021-04-28 PROCEDURE — 99233 SBSQ HOSP IP/OBS HIGH 50: CPT | Performed by: HOSPITALIST

## 2021-04-28 PROCEDURE — 93306 TTE W/DOPPLER COMPLETE: CPT | Mod: 26 | Performed by: INTERNAL MEDICINE

## 2021-04-28 PROCEDURE — 36415 COLL VENOUS BLD VENIPUNCTURE: CPT | Performed by: INTERNAL MEDICINE

## 2021-04-28 PROCEDURE — 85027 COMPLETE CBC AUTOMATED: CPT | Performed by: INTERNAL MEDICINE

## 2021-04-28 PROCEDURE — 120N000001 HC R&B MED SURG/OB

## 2021-04-28 PROCEDURE — 999N001017 HC STATISTIC GLUCOSE BY METER IP

## 2021-04-28 PROCEDURE — 83735 ASSAY OF MAGNESIUM: CPT | Performed by: INTERNAL MEDICINE

## 2021-04-28 PROCEDURE — 84484 ASSAY OF TROPONIN QUANT: CPT | Performed by: INTERNAL MEDICINE

## 2021-04-28 PROCEDURE — 93306 TTE W/DOPPLER COMPLETE: CPT

## 2021-04-28 PROCEDURE — 83605 ASSAY OF LACTIC ACID: CPT | Performed by: HOSPITALIST

## 2021-04-28 PROCEDURE — 250N000011 HC RX IP 250 OP 636: Performed by: INTERNAL MEDICINE

## 2021-04-28 PROCEDURE — 80048 BASIC METABOLIC PNL TOTAL CA: CPT | Performed by: INTERNAL MEDICINE

## 2021-04-28 PROCEDURE — 250N000013 HC RX MED GY IP 250 OP 250 PS 637: Performed by: HOSPITALIST

## 2021-04-28 RX ORDER — CHLORTHALIDONE 25 MG/1
25 TABLET ORAL DAILY
Status: DISCONTINUED | OUTPATIENT
Start: 2021-04-28 | End: 2021-04-29

## 2021-04-28 RX ORDER — CLONIDINE HYDROCHLORIDE 0.1 MG/1
0.2 TABLET ORAL 3 TIMES DAILY
Status: DISCONTINUED | OUTPATIENT
Start: 2021-04-28 | End: 2021-04-29 | Stop reason: HOSPADM

## 2021-04-28 RX ORDER — LOSARTAN POTASSIUM 100 MG/1
100 TABLET ORAL DAILY
Status: DISCONTINUED | OUTPATIENT
Start: 2021-04-28 | End: 2021-04-29 | Stop reason: HOSPADM

## 2021-04-28 RX ORDER — CARVEDILOL 12.5 MG/1
12.5 TABLET ORAL 2 TIMES DAILY WITH MEALS
Status: DISCONTINUED | OUTPATIENT
Start: 2021-04-28 | End: 2021-04-28

## 2021-04-28 RX ORDER — CARVEDILOL 25 MG/1
25 TABLET ORAL 2 TIMES DAILY WITH MEALS
Status: DISCONTINUED | OUTPATIENT
Start: 2021-04-28 | End: 2021-04-29 | Stop reason: HOSPADM

## 2021-04-28 RX ORDER — LABETALOL HYDROCHLORIDE 5 MG/ML
20 INJECTION, SOLUTION INTRAVENOUS EVERY 4 HOURS PRN
Status: DISCONTINUED | OUTPATIENT
Start: 2021-04-28 | End: 2021-04-29 | Stop reason: HOSPADM

## 2021-04-28 RX ORDER — CHLORTHALIDONE 25 MG/1
25 TABLET ORAL DAILY
Status: DISCONTINUED | OUTPATIENT
Start: 2021-04-28 | End: 2021-04-28

## 2021-04-28 RX ADMIN — LORAZEPAM 2 MG: 2 INJECTION INTRAMUSCULAR; INTRAVENOUS at 10:40

## 2021-04-28 RX ADMIN — THIAMINE HCL TAB 100 MG 200 MG: 100 TAB at 22:12

## 2021-04-28 RX ADMIN — LABETALOL HYDROCHLORIDE 10 MG: 5 INJECTION, SOLUTION INTRAVENOUS at 08:20

## 2021-04-28 RX ADMIN — CLONIDINE HYDROCHLORIDE 0.2 MG: 0.1 TABLET ORAL at 16:17

## 2021-04-28 RX ADMIN — CLONIDINE HYDROCHLORIDE 0.2 MG: 0.1 TABLET ORAL at 22:12

## 2021-04-28 RX ADMIN — PANTOPRAZOLE SODIUM 40 MG: 40 TABLET, DELAYED RELEASE ORAL at 22:12

## 2021-04-28 RX ADMIN — CLONIDINE HYDROCHLORIDE 0.2 MG: 0.1 TABLET ORAL at 10:08

## 2021-04-28 RX ADMIN — PANTOPRAZOLE SODIUM 40 MG: 40 TABLET, DELAYED RELEASE ORAL at 08:19

## 2021-04-28 RX ADMIN — CHLORTHALIDONE 25 MG: 25 TABLET ORAL at 16:17

## 2021-04-28 RX ADMIN — FOLIC ACID 1 MG: 1 TABLET ORAL at 08:20

## 2021-04-28 RX ADMIN — CARVEDILOL 25 MG: 25 TABLET, FILM COATED ORAL at 18:47

## 2021-04-28 RX ADMIN — ACETAMINOPHEN 650 MG: 325 TABLET, FILM COATED ORAL at 13:45

## 2021-04-28 RX ADMIN — LOSARTAN POTASSIUM 100 MG: 100 TABLET, FILM COATED ORAL at 08:20

## 2021-04-28 RX ADMIN — THIAMINE HCL TAB 100 MG 200 MG: 100 TAB at 16:17

## 2021-04-28 RX ADMIN — MULTIPLE VITAMINS W/ MINERALS TAB 1 TABLET: TAB at 08:20

## 2021-04-28 RX ADMIN — LORAZEPAM 1 MG: 2 INJECTION INTRAMUSCULAR; INTRAVENOUS at 13:49

## 2021-04-28 RX ADMIN — ONDANSETRON 4 MG: 2 INJECTION INTRAMUSCULAR; INTRAVENOUS at 10:40

## 2021-04-28 RX ADMIN — POTASSIUM CHLORIDE AND SODIUM CHLORIDE: 900; 150 INJECTION, SOLUTION INTRAVENOUS at 09:54

## 2021-04-28 RX ADMIN — ASPIRIN 81 MG 81 MG: 81 TABLET ORAL at 08:30

## 2021-04-28 RX ADMIN — CARVEDILOL 12.5 MG: 12.5 TABLET, FILM COATED ORAL at 09:54

## 2021-04-28 RX ADMIN — THIAMINE HCL TAB 100 MG 200 MG: 100 TAB at 08:19

## 2021-04-28 RX ADMIN — HYDRALAZINE HYDROCHLORIDE 10 MG: 20 INJECTION INTRAMUSCULAR; INTRAVENOUS at 09:54

## 2021-04-28 ASSESSMENT — ACTIVITIES OF DAILY LIVING (ADL)
ADLS_ACUITY_SCORE: 16
ADLS_ACUITY_SCORE: 14

## 2021-04-28 NOTE — PROGRESS NOTES
DATE:  4/28/2021   TIME OF RECEIPT FROM LAB:  0811  LAB TEST:  lactate  LAB VALUE:  3.6  RESULTS GIVEN WITH READ-BACK TO (PROVIDER): Dr. Rahat Rajput  TIME LAB VALUE REPORTED TO PROVIDER:   0811    Informed primary RN, Katja Hurley.  (Dr. Rajput is rounding on the patient as I write this. )      **Physician came up and saw pt and altered orders as necessary.

## 2021-04-28 NOTE — PLAN OF CARE
Presentation/Diagnosis: Pt admitted  for chest pain, cough, palpitation. DARIN of 0.4, hypertensive urgency.   History: ETOH. HTN, asthma, DM, sarcoidosis   Labs/Protocols: B, 93   Vitals: Hypertensive. IV Labetalol and Hydralazine given. Most recent /89  Telemetry: T SR   Respiratory: WDL.   Neuro: Anxious, sad, depressed. Suicide precautions.   GI/: WDL. Need to collect pregnancy test   Skin: WDL.   LDA's: r & l piv sl   Diet: Reg   Activity: SBA.   Teaching: Educated on lorazepam, BP meds, nausea medication    Plan: plan is to have psych consult, PT, and SW. Home in 1-2 days.

## 2021-04-28 NOTE — ED NOTES
[facility]  ED Nurse Handoff Report    Jose Corral is a 50 year old female   ED Chief complaint: Chest Pain, Cough, and Suicidal  . ED Diagnosis:   Final diagnoses:   Hypertensive urgency   Cough   Palpitations   Chest pain, unspecified type   Suicidal ideation   Depression, unspecified depression type     Allergies: No Known Allergies    Code Status: Full Code  Activity level - Baseline/Home:  Independent. Activity Level - Current:   Stand by Assist. Lift room needed: No. Bariatric: No   Needed: No   Isolation: No. Infection: Not Applicable.     Vital Signs:   Vitals:    04/27/21 1900 04/27/21 1939 04/27/21 2005 04/27/21 2015   BP: (!) 179/129 (!) 178/105 (!) 157/103 (!) 143/84   Pulse: 87 83 82 81   Resp:       Temp:       SpO2: 97% 95%  94%       Cardiac Rhythm:  ,      Pain level:    Patient confused: Yes. Patient Falls Risk: Yes.   Elimination Status: Has voided   Patient Report - Initial Complaint: Chest pain. Focused Assessment: Alert. Intoxicated and forgetful.  C/O chest pain and palpitations that started today after being told she could not come to work due to a cough. Pt states she then went home and drank and she does that to cope sometimes. Hypertensive since arrival. Labetalol given with improvement. Pt is intermittently tearful and anxious. In need of redirection. Not using call light for needs. Declines nicotine patch.   Tests Performed: Labs, Chest CT. Abnormal Results:   Labs Ordered and Resulted from Time of ED Arrival Up to the Time of Departure from the ED   CBC WITH PLATELETS DIFFERENTIAL - Abnormal; Notable for the following components:       Result Value    WBC 3.8 (*)     RDW 20.7 (*)     Absolute Neutrophil 1.4 (*)     All other components within normal limits   D DIMER QUANTITATIVE - Abnormal; Notable for the following components:    D Dimer 1.0 (*)     All other components within normal limits   COMPREHENSIVE METABOLIC PANEL - Abnormal; Notable for the  following components:    Potassium 3.2 (*)     Calcium 8.3 (*)     AST 52 (*)     All other components within normal limits   ALCOHOL ETHYL - Abnormal; Notable for the following components:    Ethanol g/dL 0.46 (*)     All other components within normal limits   ROUTINE UA WITH MICROSCOPIC - Abnormal; Notable for the following components:    Ketones Urine Trace (*)     Blood Urine Small (*)     Protein Albumin Urine 100 (*)     Leukocyte Esterase Urine Small (*)     WBC Urine 8 (*)     RBC Urine 3 (*)     Bacteria Urine Few (*)     Squamous Epithelial /HPF Urine 10 (*)     Mucous Urine Present (*)     All other components within normal limits   TROPONIN I   TSH WITH FREE T4 REFLEX   INFLUENZA A/B & SARS-COV2 PCR MULTIPLEX   NT PROBNP INPATIENT   CARDIAC CONTINUOUS MONITORING   DOCUMENT IN LEGAL HOLD NAVIGATOR     Labs Ordered and Resulted from Time of ED Arrival Up to the Time of Departure from the ED   CBC WITH PLATELETS DIFFERENTIAL - Abnormal; Notable for the following components:       Result Value    WBC 3.8 (*)     RDW 20.7 (*)     Absolute Neutrophil 1.4 (*)     All other components within normal limits   D DIMER QUANTITATIVE - Abnormal; Notable for the following components:    D Dimer 1.0 (*)     All other components within normal limits   COMPREHENSIVE METABOLIC PANEL - Abnormal; Notable for the following components:    Potassium 3.2 (*)     Calcium 8.3 (*)     AST 52 (*)     All other components within normal limits   ALCOHOL ETHYL - Abnormal; Notable for the following components:    Ethanol g/dL 0.46 (*)     All other components within normal limits   ROUTINE UA WITH MICROSCOPIC - Abnormal; Notable for the following components:    Ketones Urine Trace (*)     Blood Urine Small (*)     Protein Albumin Urine 100 (*)     Leukocyte Esterase Urine Small (*)     WBC Urine 8 (*)     RBC Urine 3 (*)     Bacteria Urine Few (*)     Squamous Epithelial /HPF Urine 10 (*)     Mucous Urine Present (*)     All other  components within normal limits   TROPONIN I   TSH WITH FREE T4 REFLEX   INFLUENZA A/B & SARS-COV2 PCR MULTIPLEX   NT PROBNP INPATIENT   CARDIAC CONTINUOUS MONITORING   DOCUMENT IN LEGAL HOLD NAVIGATOR     CT Chest Pulmonary Embolism w Contrast   Final Result   IMPRESSION:   1.  No evidence for pulmonary embolism.    2.  Cardiomegaly.   3.  Pulmonary arterial hypertension.   4.  Possible esophagitis, please correlate clinically.        .   Treatments provided: IVF,   Family Comments: No family at bedside. Pt has been in touch with her brother.   OBS brochure/video discussed/provided to patient:  No  ED Medications:   Medications   labetalol (NORMODYNE/TRANDATE) injection 10 mg (has no administration in time range)   0.9% sodium chloride BOLUS (has no administration in time range)   CT Scan Flush (87 mLs Intravenous Given 4/27/21 1920)   iopamidol (ISOVUE-370) solution 500 mL (68 mLs Intravenous Given 4/27/21 1920)     Drips infusing:  No  For the majority of the shift, the patient's behavior yellow Interventions performed were Redirection.    Sepsis treatment initiated: No     Patient tested for COVID 19 prior to admission: YES    ED Nurse Name/Phone Number: Katja Pruett RN,   8:50 PM    RECEIVING UNIT ED HANDOFF REVIEW    Above ED Nurse Handoff Report was reviewed: Yes  Reviewed by: Faina Glaser RN on April 27, 2021 at 9:40 PM
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
## 2021-04-28 NOTE — PHARMACY-ADMISSION MEDICATION HISTORY
"Admission medication history interview status for this patient is complete. See [facility] admission navigator for allergy information, prior to admission medications and immunization status.     Medication history interview done, indicate source(s): Patient  Medication history resources (including written lists, pill bottles, clinic record): past discharge review  Pharmacy: Unknown where patient would like to  prescriptions upon discharge.  Please verify with patient prior to discharge and add the Pharmacy into the Admission navigator, under the \"Prior to Adm Meds\" section.       Changes made to PTA medication list:  Added: aspirin  Deleted: lisinopril  Changed: none    Actions taken by pharmacist (provider contacted, etc):None     Additional medication history information:  - Per AVS on March 12th, patient had a paper prescription for Hyzaar but pt did not fill it.     Medication reconciliation/reorder completed by provider prior to medication history?  N    Prior to Admission medications    Medication Sig Last Dose Taking? Auth Provider   aspirin (ASA) 81 MG chewable tablet Take 81 mg by mouth daily 4/26/2021 at Unknown time Yes Unknown, Entered By History   losartan-hydrochlorothiazide (HYZAAR) 50-12.5 MG tablet Take 1 tablet by mouth daily  at has never filled her paper rx in March  Dee Dee Foss MD         "

## 2021-04-28 NOTE — PLAN OF CARE
VSS ex htn. Initial /118 on admission, gave IV hydralazine x1 and IV labetalol x1, down to 153/72. Continues to c/o palpitations. Denies chest pain/SOB. Tele is SR with PVCs. On RA, LS clear. Pt is anxious and tearful, declined anxiety medication at this time. Also reporting some mild nausea and declines prn zofran. Pt appears intoxicated. CIWAs 7,6. Neuro assessment is intact. Sitter at bedside.     On suicide precautions and 72 hr hold. Pt belongings removed from room and locked in secure pt belongings cabinet include bag of clothing/shoes, backpack with various personal belongings including papers, notebook, food, spare change, credit card. Security called to dispose of 1/2 full bottle of vodka found in pt's bag.
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
## 2021-04-29 VITALS
BODY MASS INDEX: 34.45 KG/M2 | OXYGEN SATURATION: 99 % | SYSTOLIC BLOOD PRESSURE: 178 MMHG | TEMPERATURE: 98.1 F | DIASTOLIC BLOOD PRESSURE: 101 MMHG | HEIGHT: 63 IN | HEART RATE: 63 BPM | RESPIRATION RATE: 18 BRPM | WEIGHT: 194.4 LBS

## 2021-04-29 LAB
GLUCOSE BLDC GLUCOMTR-MCNC: 127 MG/DL (ref 70–99)
HCG UR QL: NEGATIVE
MAGNESIUM SERPL-MCNC: 1.6 MG/DL (ref 1.6–2.3)
POTASSIUM SERPL-SCNC: 3.9 MMOL/L (ref 3.4–5.3)

## 2021-04-29 PROCEDURE — 36415 COLL VENOUS BLD VENIPUNCTURE: CPT | Performed by: INTERNAL MEDICINE

## 2021-04-29 PROCEDURE — 999N001017 HC STATISTIC GLUCOSE BY METER IP

## 2021-04-29 PROCEDURE — 250N000011 HC RX IP 250 OP 636: Performed by: HOSPITALIST

## 2021-04-29 PROCEDURE — 83735 ASSAY OF MAGNESIUM: CPT | Performed by: INTERNAL MEDICINE

## 2021-04-29 PROCEDURE — 81025 URINE PREGNANCY TEST: CPT | Performed by: HOSPITALIST

## 2021-04-29 PROCEDURE — 99233 SBSQ HOSP IP/OBS HIGH 50: CPT | Performed by: HOSPITALIST

## 2021-04-29 PROCEDURE — 999N000216 HC STATISTIC ADULT CD FACE TO FACE-NO CHRG

## 2021-04-29 PROCEDURE — 250N000013 HC RX MED GY IP 250 OP 250 PS 637: Performed by: INTERNAL MEDICINE

## 2021-04-29 PROCEDURE — 250N000013 HC RX MED GY IP 250 OP 250 PS 637: Performed by: HOSPITALIST

## 2021-04-29 PROCEDURE — 84132 ASSAY OF SERUM POTASSIUM: CPT | Performed by: INTERNAL MEDICINE

## 2021-04-29 PROCEDURE — 250N000011 HC RX IP 250 OP 636: Performed by: INTERNAL MEDICINE

## 2021-04-29 RX ORDER — CARVEDILOL 25 MG/1
25 TABLET ORAL 2 TIMES DAILY WITH MEALS
Qty: 28 TABLET | Refills: 0 | Status: ON HOLD | OUTPATIENT
Start: 2021-04-29 | End: 2022-03-28

## 2021-04-29 RX ORDER — CHLORTHALIDONE 25 MG/1
50 TABLET ORAL DAILY
Status: DISCONTINUED | OUTPATIENT
Start: 2021-04-29 | End: 2021-04-29 | Stop reason: HOSPADM

## 2021-04-29 RX ORDER — LOSARTAN POTASSIUM 100 MG/1
100 TABLET ORAL DAILY
Qty: 14 TABLET | Refills: 0 | Status: ON HOLD | OUTPATIENT
Start: 2021-04-30 | End: 2022-03-28

## 2021-04-29 RX ADMIN — LABETALOL HYDROCHLORIDE 20 MG: 5 INJECTION, SOLUTION INTRAVENOUS at 06:06

## 2021-04-29 RX ADMIN — HYDRALAZINE HYDROCHLORIDE 10 MG: 20 INJECTION INTRAMUSCULAR; INTRAVENOUS at 05:32

## 2021-04-29 RX ADMIN — PANTOPRAZOLE SODIUM 40 MG: 40 TABLET, DELAYED RELEASE ORAL at 08:38

## 2021-04-29 RX ADMIN — LOSARTAN POTASSIUM 100 MG: 100 TABLET, FILM COATED ORAL at 08:38

## 2021-04-29 RX ADMIN — THIAMINE HCL TAB 100 MG 200 MG: 100 TAB at 08:38

## 2021-04-29 RX ADMIN — ASPIRIN 81 MG 81 MG: 81 TABLET ORAL at 08:38

## 2021-04-29 RX ADMIN — MULTIPLE VITAMINS W/ MINERALS TAB 1 TABLET: TAB at 08:38

## 2021-04-29 RX ADMIN — FOLIC ACID 1 MG: 1 TABLET ORAL at 08:38

## 2021-04-29 RX ADMIN — NICOTINE POLACRILEX 2 MG: 2 GUM, CHEWING ORAL at 11:40

## 2021-04-29 RX ADMIN — CLONIDINE HYDROCHLORIDE 0.2 MG: 0.1 TABLET ORAL at 08:37

## 2021-04-29 RX ADMIN — CARVEDILOL 25 MG: 25 TABLET, FILM COATED ORAL at 08:38

## 2021-04-29 RX ADMIN — CHLORTHALIDONE 50 MG: 25 TABLET ORAL at 08:37

## 2021-04-29 ASSESSMENT — ACTIVITIES OF DAILY LIVING (ADL)
ADLS_ACUITY_SCORE: 16
ADLS_ACUITY_SCORE: 16
ADLS_ACUITY_SCORE: 13
ADLS_ACUITY_SCORE: 16

## 2021-04-29 NOTE — PROGRESS NOTES
Follow-up hospitalist note    Pt wanting to leave AMA. I told her this AM that she needs to stay as I have started 4 BP meds and she is still uncontrolled, and she needs to have CD eval. I will prescribe coreg and losartan and she needs BMP in 1 week. She will need to sign out AMA as this is not a formal discharge. She was extensively counseled on etoh cessation this morning.    Rahat Rajput, DO  April 29, 2021

## 2021-04-29 NOTE — PLAN OF CARE
Vitals VSS, except BP elevated. Pt given prn hydralazine and labetalol  Neuro Q4hr neuro checks- intact. CIWA scores 1 and 6  Respiratory 95% RA  Cardiac/Tele SR  GI/ Continent   Skin WDL  LDAs PIV SL  Labs K+ 3.9, Mag 1.6  Diet Regular  Activity SBA  Plan Continue to monitor.

## 2021-04-29 NOTE — PLAN OF CARE
Pt A/O x 4, VSS (ex BPs 171/91 - gave scheduled medications - see MAR). Pt denies pain, headache, dizziness, N/V & SOB. Lung sounds clear, RA. Tele: SR. Pt independent in room. Chem Dep, Social Work & PT consulted. Possible discharge Friday. Will continue with plan of care.

## 2021-04-29 NOTE — PLAN OF CARE
A&OX3. Lethargic.  Disoriented to time. LS clear. Up with assist of 1 with gait belt. 72 hour hold. Sitter at bed side. Reg diet. Tele SR. Not able to collect urine output. Next shift nurse updated to follow up. CIWA 3 and 3. . Continue monitoring.

## 2021-04-29 NOTE — PLAN OF CARE
Care Management Discharge Note  --  Discharge Date: 04/29/21  Expected Time of Departure: Leaving AMA    Discharge Disposition: Outpatient Mental Health, Outpatient Chemical Dependency    Discharge Services: Chemical Dependency Resources, Mental Health Resources, Other (see comment)(Low cost dentist)    Discharge DME: None    Discharge Transportation: car, drives self    Private pay costs discussed: Not applicable    PAS Confirmation Code:  Not applicable  Patient/family educated on Medicare website which has current facility and service quality ratings: other (see comments)(NA)    Education Provided on the Discharge Plan:  yes  Persons Notified of Discharge Plans: patient  Patient/Family in Agreement with the Plan: yes    Handoff Referral Completed: No    Additional Information:  Patient is discharging AMA today. Returning home. Requested resources for low cost dental services and information on SUDs program. Information given. She had no other concerns for JAZMIN.    MARCEL Marina   Inpatient Care Coordination   Supervisor  [facility]  [phone]                NICKY Johnson
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
## 2021-04-29 NOTE — CONSULTS
4/29/2021      Spoke with pt via telephone for CD Consult. Pt reports she would like to attend outpatient CD treatment. Pt does not currently have insurance and is unable to self pay. Discussed with pt [facility] funding options. Pt reports she may not meet income eligibility guidelines. Pt reported she would like a referral to mobile SUDS. SW has been notified and a resource sheet with [facility] funding contact numbers  and information has been sent to SW to give to pt. [employer] doesn t take insurance or referrals/pay out of pocket only Phone: [phone]  Fax: [phone] but may be an option as they have a scholarship program. Pt could contact [employer] directly if she's interested.     Lynnette Mathis [facility]  Phone: [phone]  Email: [email or web address]
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
## 2021-11-04 ENCOUNTER — HOSPITAL ENCOUNTER (EMERGENCY)
Facility: CLINIC | Age: 51
Discharge: LEFT BEFORE DISCHARGE | End: 2021-11-04
Admitting: EMERGENCY MEDICINE

## 2021-11-04 VITALS
RESPIRATION RATE: 16 BRPM | HEART RATE: 85 BPM | TEMPERATURE: 98 F | SYSTOLIC BLOOD PRESSURE: 182 MMHG | OXYGEN SATURATION: 98 % | DIASTOLIC BLOOD PRESSURE: 101 MMHG

## 2021-11-04 LAB
FLUAV RNA SPEC QL NAA+PROBE: NEGATIVE
FLUBV RNA RESP QL NAA+PROBE: NEGATIVE
SARS-COV-2 RNA RESP QL NAA+PROBE: NEGATIVE

## 2021-11-04 PROCEDURE — 87636 SARSCOV2 & INF A&B AMP PRB: CPT | Performed by: EMERGENCY MEDICINE

## 2021-11-04 PROCEDURE — C9803 HOPD COVID-19 SPEC COLLECT: HCPCS

## 2021-11-04 PROCEDURE — 999N000104 HC STATISTIC NO CHARGE

## 2021-11-04 NOTE — ED TRIAGE NOTES
Patient is here for fatigue, headaches, dizziness, nausea, vomiting, cough, body aches for two weeks. She states she was feeling so unwell she needed to stay home from work the past two weeks.  She works with children.    ABCs intact.  Patient is alert and oriented x3.

## 2022-03-26 ENCOUNTER — HOSPITAL ENCOUNTER (INPATIENT)
Facility: CLINIC | Age: 52
LOS: 2 days | Discharge: HOME | DRG: 193 | End: 2022-03-28
Attending: EMERGENCY MEDICINE | Admitting: INTERNAL MEDICINE

## 2022-03-26 ENCOUNTER — APPOINTMENT (OUTPATIENT)
Dept: GENERAL RADIOLOGY | Facility: CLINIC | Age: 52
DRG: 193 | End: 2022-03-26
Attending: EMERGENCY MEDICINE

## 2022-03-26 DIAGNOSIS — F10.20 ALCOHOL USE DISORDER, MODERATE, DEPENDENCE (H): ICD-10-CM

## 2022-03-26 DIAGNOSIS — J18.9 PNEUMONIA DUE TO INFECTIOUS ORGANISM, UNSPECIFIED LATERALITY, UNSPECIFIED PART OF LUNG: ICD-10-CM

## 2022-03-26 DIAGNOSIS — I16.0 HYPERTENSIVE URGENCY: ICD-10-CM

## 2022-03-26 DIAGNOSIS — E87.6 HYPOKALEMIA: ICD-10-CM

## 2022-03-26 DIAGNOSIS — E83.42 HYPOMAGNESEMIA: ICD-10-CM

## 2022-03-26 DIAGNOSIS — K85.20 ALCOHOL-INDUCED ACUTE PANCREATITIS, UNSPECIFIED COMPLICATION STATUS: ICD-10-CM

## 2022-03-26 DIAGNOSIS — J45.20 MILD INTERMITTENT ASTHMA, UNSPECIFIED WHETHER COMPLICATED: Primary | ICD-10-CM

## 2022-03-26 LAB
ALBUMIN SERPL-MCNC: 3 G/DL (ref 3.4–5)
ALBUMIN UR-MCNC: 100 MG/DL
ALP SERPL-CCNC: 90 U/L (ref 40–150)
ALT SERPL W P-5'-P-CCNC: 62 U/L (ref 0–50)
ANION GAP SERPL CALCULATED.3IONS-SCNC: 9 MMOL/L (ref 3–14)
APPEARANCE UR: ABNORMAL
AST SERPL W P-5'-P-CCNC: 202 U/L (ref 0–45)
BACTERIA #/AREA URNS HPF: ABNORMAL /HPF
BASOPHILS # BLD AUTO: 0 10E3/UL (ref 0–0.2)
BASOPHILS NFR BLD AUTO: 0 %
BILIRUB SERPL-MCNC: 0.6 MG/DL (ref 0.2–1.3)
BILIRUB UR QL STRIP: NEGATIVE
BUN SERPL-MCNC: 7 MG/DL (ref 7–30)
CALCIUM SERPL-MCNC: 8.5 MG/DL (ref 8.5–10.1)
CHLORIDE BLD-SCNC: 98 MMOL/L (ref 94–109)
CO2 SERPL-SCNC: 30 MMOL/L (ref 20–32)
COLOR UR AUTO: YELLOW
CREAT SERPL-MCNC: 0.49 MG/DL (ref 0.52–1.04)
CREAT SERPL-MCNC: 0.53 MG/DL (ref 0.52–1.04)
EOSINOPHIL # BLD AUTO: 0 10E3/UL (ref 0–0.7)
EOSINOPHIL NFR BLD AUTO: 0 %
ERYTHROCYTE [DISTWIDTH] IN BLOOD BY AUTOMATED COUNT: 14.7 % (ref 10–15)
ETHANOL SERPL-MCNC: 0.33 G/DL
FLUAV RNA SPEC QL NAA+PROBE: NEGATIVE
FLUBV RNA RESP QL NAA+PROBE: NEGATIVE
GFR SERPL CREATININE-BSD FRML MDRD: >90 ML/MIN/1.73M2
GFR SERPL CREATININE-BSD FRML MDRD: >90 ML/MIN/1.73M2
GLUCOSE BLD-MCNC: 89 MG/DL (ref 70–99)
GLUCOSE UR STRIP-MCNC: NEGATIVE MG/DL
HCG SERPL QL: NEGATIVE
HCT VFR BLD AUTO: 33.3 % (ref 35–47)
HGB BLD-MCNC: 12 G/DL (ref 11.7–15.7)
HGB UR QL STRIP: ABNORMAL
HOLD SPECIMEN: NORMAL
IMM GRANULOCYTES # BLD: 0 10E3/UL
IMM GRANULOCYTES NFR BLD: 0 %
KETONES UR STRIP-MCNC: ABNORMAL MG/DL
LEUKOCYTE ESTERASE UR QL STRIP: ABNORMAL
LIPASE SERPL-CCNC: 1428 U/L (ref 73–393)
LYMPHOCYTES # BLD AUTO: 0.9 10E3/UL (ref 0.8–5.3)
LYMPHOCYTES NFR BLD AUTO: 14 %
MAGNESIUM SERPL-MCNC: 1.3 MG/DL (ref 1.6–2.3)
MAGNESIUM SERPL-MCNC: 1.5 MG/DL (ref 1.6–2.3)
MCH RBC QN AUTO: 36.4 PG (ref 26.5–33)
MCHC RBC AUTO-ENTMCNC: 36 G/DL (ref 31.5–36.5)
MCV RBC AUTO: 101 FL (ref 78–100)
MONOCYTES # BLD AUTO: 0.8 10E3/UL (ref 0–1.3)
MONOCYTES NFR BLD AUTO: 13 %
MUCOUS THREADS #/AREA URNS LPF: PRESENT /LPF
NEUTROPHILS # BLD AUTO: 4.7 10E3/UL (ref 1.6–8.3)
NEUTROPHILS NFR BLD AUTO: 73 %
NITRATE UR QL: NEGATIVE
NRBC # BLD AUTO: 0 10E3/UL
NRBC BLD AUTO-RTO: 0 /100
NT-PROBNP SERPL-MCNC: 146 PG/ML (ref 0–900)
PH UR STRIP: 6.5 [PH] (ref 5–7)
PLATELET # BLD AUTO: 112 10E3/UL (ref 150–450)
POTASSIUM BLD-SCNC: 2.8 MMOL/L (ref 3.4–5.3)
POTASSIUM BLD-SCNC: 3 MMOL/L (ref 3.4–5.3)
PROT SERPL-MCNC: 7.9 G/DL (ref 6.8–8.8)
RBC # BLD AUTO: 3.3 10E6/UL (ref 3.8–5.2)
RBC URINE: 7 /HPF
SARS-COV-2 RNA RESP QL NAA+PROBE: NEGATIVE
SODIUM SERPL-SCNC: 137 MMOL/L (ref 133–144)
SP GR UR STRIP: 1.02 (ref 1–1.03)
SQUAMOUS EPITHELIAL: 87 /HPF
TROPONIN I SERPL HS-MCNC: 13 NG/L
UROBILINOGEN UR STRIP-MCNC: 2 MG/DL
WBC # BLD AUTO: 6.4 10E3/UL (ref 4–11)
WBC URINE: 12 /HPF

## 2022-03-26 PROCEDURE — 82565 ASSAY OF CREATININE: CPT | Performed by: INTERNAL MEDICINE

## 2022-03-26 PROCEDURE — 84132 ASSAY OF SERUM POTASSIUM: CPT | Performed by: INTERNAL MEDICINE

## 2022-03-26 PROCEDURE — 93005 ELECTROCARDIOGRAM TRACING: CPT

## 2022-03-26 PROCEDURE — 82040 ASSAY OF SERUM ALBUMIN: CPT | Performed by: EMERGENCY MEDICINE

## 2022-03-26 PROCEDURE — 96368 THER/DIAG CONCURRENT INF: CPT

## 2022-03-26 PROCEDURE — 81001 URINALYSIS AUTO W/SCOPE: CPT | Performed by: EMERGENCY MEDICINE

## 2022-03-26 PROCEDURE — 99223 1ST HOSP IP/OBS HIGH 75: CPT | Mod: AI | Performed by: INTERNAL MEDICINE

## 2022-03-26 PROCEDURE — 84703 CHORIONIC GONADOTROPIN ASSAY: CPT | Performed by: EMERGENCY MEDICINE

## 2022-03-26 PROCEDURE — 250N000013 HC RX MED GY IP 250 OP 250 PS 637: Performed by: INTERNAL MEDICINE

## 2022-03-26 PROCEDURE — 87040 BLOOD CULTURE FOR BACTERIA: CPT | Performed by: EMERGENCY MEDICINE

## 2022-03-26 PROCEDURE — 83735 ASSAY OF MAGNESIUM: CPT | Performed by: INTERNAL MEDICINE

## 2022-03-26 PROCEDURE — 36415 COLL VENOUS BLD VENIPUNCTURE: CPT | Performed by: EMERGENCY MEDICINE

## 2022-03-26 PROCEDURE — 84484 ASSAY OF TROPONIN QUANT: CPT | Performed by: EMERGENCY MEDICINE

## 2022-03-26 PROCEDURE — 96361 HYDRATE IV INFUSION ADD-ON: CPT

## 2022-03-26 PROCEDURE — 258N000003 HC RX IP 258 OP 636: Performed by: EMERGENCY MEDICINE

## 2022-03-26 PROCEDURE — 82077 ASSAY SPEC XCP UR&BREATH IA: CPT | Performed by: EMERGENCY MEDICINE

## 2022-03-26 PROCEDURE — 36415 COLL VENOUS BLD VENIPUNCTURE: CPT | Performed by: INTERNAL MEDICINE

## 2022-03-26 PROCEDURE — 83880 ASSAY OF NATRIURETIC PEPTIDE: CPT | Performed by: EMERGENCY MEDICINE

## 2022-03-26 PROCEDURE — HZ2ZZZZ DETOXIFICATION SERVICES FOR SUBSTANCE ABUSE TREATMENT: ICD-10-PCS | Performed by: INTERNAL MEDICINE

## 2022-03-26 PROCEDURE — 87636 SARSCOV2 & INF A&B AMP PRB: CPT | Performed by: EMERGENCY MEDICINE

## 2022-03-26 PROCEDURE — 96365 THER/PROPH/DIAG IV INF INIT: CPT

## 2022-03-26 PROCEDURE — 258N000003 HC RX IP 258 OP 636: Performed by: INTERNAL MEDICINE

## 2022-03-26 PROCEDURE — 250N000011 HC RX IP 250 OP 636: Performed by: INTERNAL MEDICINE

## 2022-03-26 PROCEDURE — 85014 HEMATOCRIT: CPT | Performed by: EMERGENCY MEDICINE

## 2022-03-26 PROCEDURE — 250N000011 HC RX IP 250 OP 636: Performed by: EMERGENCY MEDICINE

## 2022-03-26 PROCEDURE — 87086 URINE CULTURE/COLONY COUNT: CPT | Performed by: EMERGENCY MEDICINE

## 2022-03-26 PROCEDURE — 99285 EMERGENCY DEPT VISIT HI MDM: CPT | Mod: 25

## 2022-03-26 PROCEDURE — C9803 HOPD COVID-19 SPEC COLLECT: HCPCS

## 2022-03-26 PROCEDURE — 250N000013 HC RX MED GY IP 250 OP 250 PS 637: Performed by: EMERGENCY MEDICINE

## 2022-03-26 PROCEDURE — 80053 COMPREHEN METABOLIC PANEL: CPT | Performed by: EMERGENCY MEDICINE

## 2022-03-26 PROCEDURE — 83735 ASSAY OF MAGNESIUM: CPT | Performed by: EMERGENCY MEDICINE

## 2022-03-26 PROCEDURE — 71046 X-RAY EXAM CHEST 2 VIEWS: CPT

## 2022-03-26 PROCEDURE — 83690 ASSAY OF LIPASE: CPT | Performed by: EMERGENCY MEDICINE

## 2022-03-26 PROCEDURE — 120N000001 HC R&B MED SURG/OB

## 2022-03-26 RX ORDER — OLANZAPINE 5 MG/1
5-10 TABLET, ORALLY DISINTEGRATING ORAL EVERY 6 HOURS PRN
Status: DISCONTINUED | OUTPATIENT
Start: 2022-03-26 | End: 2022-03-28 | Stop reason: HOSPADM

## 2022-03-26 RX ORDER — NALOXONE HYDROCHLORIDE 0.4 MG/ML
0.4 INJECTION, SOLUTION INTRAMUSCULAR; INTRAVENOUS; SUBCUTANEOUS
Status: DISCONTINUED | OUTPATIENT
Start: 2022-03-26 | End: 2022-03-28 | Stop reason: HOSPADM

## 2022-03-26 RX ORDER — POLYETHYLENE GLYCOL 3350 17 G/17G
17 POWDER, FOR SOLUTION ORAL DAILY PRN
Status: DISCONTINUED | OUTPATIENT
Start: 2022-03-26 | End: 2022-03-28 | Stop reason: HOSPADM

## 2022-03-26 RX ORDER — LIDOCAINE 40 MG/G
CREAM TOPICAL
Status: DISCONTINUED | OUTPATIENT
Start: 2022-03-26 | End: 2022-03-28 | Stop reason: HOSPADM

## 2022-03-26 RX ORDER — SODIUM CHLORIDE AND POTASSIUM CHLORIDE 150; 900 MG/100ML; MG/100ML
INJECTION, SOLUTION INTRAVENOUS CONTINUOUS
Status: DISCONTINUED | OUTPATIENT
Start: 2022-03-26 | End: 2022-03-28 | Stop reason: HOSPADM

## 2022-03-26 RX ORDER — PANTOPRAZOLE SODIUM 40 MG/1
40 TABLET, DELAYED RELEASE ORAL
Status: DISCONTINUED | OUTPATIENT
Start: 2022-03-27 | End: 2022-03-28 | Stop reason: HOSPADM

## 2022-03-26 RX ORDER — CEFTRIAXONE 1 G/1
1 INJECTION, POWDER, FOR SOLUTION INTRAMUSCULAR; INTRAVENOUS ONCE
Status: COMPLETED | OUTPATIENT
Start: 2022-03-26 | End: 2022-03-26

## 2022-03-26 RX ORDER — GABAPENTIN 100 MG/1
200 CAPSULE ORAL 2 TIMES DAILY
Status: DISCONTINUED | OUTPATIENT
Start: 2022-03-26 | End: 2022-03-28 | Stop reason: HOSPADM

## 2022-03-26 RX ORDER — FLUMAZENIL 0.1 MG/ML
0.2 INJECTION, SOLUTION INTRAVENOUS
Status: DISCONTINUED | OUTPATIENT
Start: 2022-03-26 | End: 2022-03-28 | Stop reason: HOSPADM

## 2022-03-26 RX ORDER — LORAZEPAM 2 MG/ML
1-2 INJECTION INTRAMUSCULAR EVERY 30 MIN PRN
Status: DISCONTINUED | OUTPATIENT
Start: 2022-03-26 | End: 2022-03-28 | Stop reason: HOSPADM

## 2022-03-26 RX ORDER — FOLIC ACID 1 MG/1
1 TABLET ORAL DAILY
Status: DISCONTINUED | OUTPATIENT
Start: 2022-03-26 | End: 2022-03-28 | Stop reason: HOSPADM

## 2022-03-26 RX ORDER — NALOXONE HYDROCHLORIDE 0.4 MG/ML
0.2 INJECTION, SOLUTION INTRAMUSCULAR; INTRAVENOUS; SUBCUTANEOUS
Status: DISCONTINUED | OUTPATIENT
Start: 2022-03-26 | End: 2022-03-28 | Stop reason: HOSPADM

## 2022-03-26 RX ORDER — AMOXICILLIN 250 MG
1 CAPSULE ORAL 2 TIMES DAILY PRN
Status: DISCONTINUED | OUTPATIENT
Start: 2022-03-26 | End: 2022-03-28 | Stop reason: HOSPADM

## 2022-03-26 RX ORDER — POTASSIUM CHLORIDE 1.5 G/1.58G
40 POWDER, FOR SOLUTION ORAL ONCE
Status: COMPLETED | OUTPATIENT
Start: 2022-03-26 | End: 2022-03-26

## 2022-03-26 RX ORDER — POTASSIUM CHLORIDE 7.45 MG/ML
10 INJECTION INTRAVENOUS ONCE
Status: COMPLETED | OUTPATIENT
Start: 2022-03-26 | End: 2022-03-26

## 2022-03-26 RX ORDER — AMOXICILLIN 250 MG
2 CAPSULE ORAL 2 TIMES DAILY PRN
Status: DISCONTINUED | OUTPATIENT
Start: 2022-03-26 | End: 2022-03-28 | Stop reason: HOSPADM

## 2022-03-26 RX ORDER — CLONIDINE HYDROCHLORIDE 0.1 MG/1
0.1 TABLET ORAL 2 TIMES DAILY
Status: DISCONTINUED | OUTPATIENT
Start: 2022-03-26 | End: 2022-03-28 | Stop reason: HOSPADM

## 2022-03-26 RX ORDER — IPRATROPIUM BROMIDE AND ALBUTEROL SULFATE 2.5; .5 MG/3ML; MG/3ML
3 SOLUTION RESPIRATORY (INHALATION) EVERY 4 HOURS PRN
Status: DISCONTINUED | OUTPATIENT
Start: 2022-03-26 | End: 2022-03-28 | Stop reason: HOSPADM

## 2022-03-26 RX ORDER — MAGNESIUM SULFATE HEPTAHYDRATE 40 MG/ML
2 INJECTION, SOLUTION INTRAVENOUS ONCE
Status: COMPLETED | OUTPATIENT
Start: 2022-03-26 | End: 2022-03-26

## 2022-03-26 RX ORDER — MAGNESIUM OXIDE 400 MG/1
400 TABLET ORAL 3 TIMES DAILY
Status: DISCONTINUED | OUTPATIENT
Start: 2022-03-26 | End: 2022-03-27

## 2022-03-26 RX ORDER — AZITHROMYCIN 250 MG/1
250 TABLET, FILM COATED ORAL DAILY
Status: DISCONTINUED | OUTPATIENT
Start: 2022-03-27 | End: 2022-03-28 | Stop reason: HOSPADM

## 2022-03-26 RX ORDER — MULTIPLE VITAMINS W/ MINERALS TAB 9MG-400MCG
1 TAB ORAL DAILY
Status: DISCONTINUED | OUTPATIENT
Start: 2022-03-26 | End: 2022-03-28 | Stop reason: HOSPADM

## 2022-03-26 RX ORDER — LORAZEPAM 1 MG/1
1-2 TABLET ORAL EVERY 30 MIN PRN
Status: DISCONTINUED | OUTPATIENT
Start: 2022-03-26 | End: 2022-03-28 | Stop reason: HOSPADM

## 2022-03-26 RX ORDER — AZITHROMYCIN 500 MG/5ML
500 INJECTION, POWDER, LYOPHILIZED, FOR SOLUTION INTRAVENOUS ONCE
Status: DISCONTINUED | OUTPATIENT
Start: 2022-03-26 | End: 2022-03-26

## 2022-03-26 RX ORDER — ONDANSETRON 2 MG/ML
4 INJECTION INTRAMUSCULAR; INTRAVENOUS EVERY 6 HOURS PRN
Status: DISCONTINUED | OUTPATIENT
Start: 2022-03-26 | End: 2022-03-28 | Stop reason: HOSPADM

## 2022-03-26 RX ORDER — OXYCODONE HYDROCHLORIDE 5 MG/1
5 TABLET ORAL EVERY 4 HOURS PRN
Status: DISCONTINUED | OUTPATIENT
Start: 2022-03-26 | End: 2022-03-28 | Stop reason: HOSPADM

## 2022-03-26 RX ORDER — POTASSIUM CHLORIDE 1500 MG/1
40 TABLET, EXTENDED RELEASE ORAL ONCE
Status: COMPLETED | OUTPATIENT
Start: 2022-03-26 | End: 2022-03-26

## 2022-03-26 RX ORDER — POTASSIUM CHLORIDE 1500 MG/1
20 TABLET, EXTENDED RELEASE ORAL ONCE
Status: COMPLETED | OUTPATIENT
Start: 2022-03-27 | End: 2022-03-27

## 2022-03-26 RX ORDER — HYDRALAZINE HYDROCHLORIDE 20 MG/ML
10 INJECTION INTRAMUSCULAR; INTRAVENOUS EVERY 4 HOURS PRN
Status: DISCONTINUED | OUTPATIENT
Start: 2022-03-26 | End: 2022-03-28 | Stop reason: HOSPADM

## 2022-03-26 RX ORDER — CEFTRIAXONE 1 G/1
1 INJECTION, POWDER, FOR SOLUTION INTRAMUSCULAR; INTRAVENOUS EVERY 24 HOURS
Status: DISCONTINUED | OUTPATIENT
Start: 2022-03-27 | End: 2022-03-28

## 2022-03-26 RX ORDER — HALOPERIDOL 5 MG/ML
2.5-5 INJECTION INTRAMUSCULAR EVERY 6 HOURS PRN
Status: DISCONTINUED | OUTPATIENT
Start: 2022-03-26 | End: 2022-03-28 | Stop reason: HOSPADM

## 2022-03-26 RX ORDER — ONDANSETRON 4 MG/1
4 TABLET, ORALLY DISINTEGRATING ORAL EVERY 6 HOURS PRN
Status: DISCONTINUED | OUTPATIENT
Start: 2022-03-26 | End: 2022-03-28 | Stop reason: HOSPADM

## 2022-03-26 RX ORDER — SODIUM CHLORIDE 9 MG/ML
INJECTION, SOLUTION INTRAVENOUS CONTINUOUS
Status: DISCONTINUED | OUTPATIENT
Start: 2022-03-26 | End: 2022-03-26

## 2022-03-26 RX ORDER — AZITHROMYCIN 500 MG/5ML
500 INJECTION, POWDER, LYOPHILIZED, FOR SOLUTION INTRAVENOUS ONCE
Status: COMPLETED | OUTPATIENT
Start: 2022-03-26 | End: 2022-03-26

## 2022-03-26 RX ADMIN — AZITHROMYCIN MONOHYDRATE 500 MG: 500 INJECTION, POWDER, LYOPHILIZED, FOR SOLUTION INTRAVENOUS at 21:12

## 2022-03-26 RX ADMIN — Medication 400 MG: at 22:54

## 2022-03-26 RX ADMIN — CEFTRIAXONE 1 G: 1 INJECTION, POWDER, FOR SOLUTION INTRAMUSCULAR; INTRAVENOUS at 19:24

## 2022-03-26 RX ADMIN — CLONIDINE HYDROCHLORIDE 0.1 MG: 0.1 TABLET ORAL at 21:21

## 2022-03-26 RX ADMIN — THIAMINE HCL TAB 100 MG 100 MG: 100 TAB at 21:11

## 2022-03-26 RX ADMIN — MULTIPLE VITAMINS W/ MINERALS TAB 1 TABLET: TAB at 21:11

## 2022-03-26 RX ADMIN — POTASSIUM CHLORIDE AND SODIUM CHLORIDE: 900; 150 INJECTION, SOLUTION INTRAVENOUS at 20:17

## 2022-03-26 RX ADMIN — MAGNESIUM SULFATE 2 G: 2 INJECTION INTRAVENOUS at 18:24

## 2022-03-26 RX ADMIN — POTASSIUM CHLORIDE 10 MEQ: 7.46 INJECTION, SOLUTION INTRAVENOUS at 17:21

## 2022-03-26 RX ADMIN — FOLIC ACID 1 MG: 1 TABLET ORAL at 21:11

## 2022-03-26 RX ADMIN — POTASSIUM CHLORIDE 40 MEQ: 1500 TABLET, EXTENDED RELEASE ORAL at 22:54

## 2022-03-26 RX ADMIN — GABAPENTIN 200 MG: 100 CAPSULE ORAL at 21:12

## 2022-03-26 RX ADMIN — SODIUM CHLORIDE 1000 ML: 9 INJECTION, SOLUTION INTRAVENOUS at 16:48

## 2022-03-26 RX ADMIN — POTASSIUM CHLORIDE 40 MEQ: 1.5 POWDER, FOR SOLUTION ORAL at 17:20

## 2022-03-26 ASSESSMENT — ENCOUNTER SYMPTOMS
COUGH: 1
NUMBNESS: 1
APPETITE CHANGE: 1
HEADACHES: 1
VOMITING: 1
ABDOMINAL PAIN: 0
FEVER: 1
DIARRHEA: 1

## 2022-03-26 ASSESSMENT — ACTIVITIES OF DAILY LIVING (ADL)
ADLS_ACUITY_SCORE: 12
ADLS_ACUITY_SCORE: 12
ADLS_ACUITY_SCORE: 10
ADLS_ACUITY_SCORE: 12

## 2022-03-26 NOTE — H&P
[facility]  History and Physical  Hospitalist - Bobby Rajput DO       Date of Admission:  3/26/2022    Chief Complaint   Cough    History is obtained from the patient, the emergency department physician, as well as the electronic medical record.    History of Present Illness   Jose Corral is a 51 year old female with past medical history of alcohol abuse, hypertension, asthma, sarcoidosis, active nicotine dependence with cigarettes who presented on 3/26/2022 with chief complaint of cough.  The patient reports approximately 2 weeks ago she developed a productive cough.  She is also had fatigue, headache, diarrhea, and one episode of emesis approximately 3 days prior to admission.  She also reports loss in appetite.  She denies any current abdominal pain.  On 3/21, she presented to urgent care who kendall lab work and found her to have lab abnormalities and so she was called and told to come to the emergency department.  She reports subjective chills over the last 2 weeks as well.  She reports that 1 L of gin will last her approximately 2 days.  Her last drink was yesterday.      In the emergency department the patient found have a temperature of 98.6  F, heart rate 94, blood pressure 138/89, respiratory rate 17, SPO2 93% on room air.  Lab work revealed hypokalemia of 2.8, albumin 3.0, AST//62, lipase 1428, platelet 112.  The patient had a negative influenza and Covid 19 PCR.  Chest x-ray reveale mild interstitial infiltrate lateral to the right suprahilar region.  The patient was started on azithromycin and ceftriaxone with concern for developing community-acquired pneumonia.  The patient was provided a DuoNeb treatment by EMS with improvement in some mild wheezing they saw on exam.  The patient was started on CIWA protocol, IV fluids, and a clear liquid diet.    ASSESSMENT/PLAN    Cough  Community Acquired Pneumonia  Asthma/COPD  - Start Azithromycin and ceftriaxone for 5 days  -  Doubt acute exacerbation  - Duonebs prn  - Pt would benefit from Albuterol inhaler at discharge    Acute Alcohol Intoxication  Alcohol Abuse  - Etoh level = 0.33 on admission  - Drinks 1 L of Gin every 2 days  - CIWA with Ativan prn  - MV, FA, Thiamine  - IVF    Alcohol Pancreatitis  - Clear liquid diet  - IVF  - Pain control prn    Active Nicotine Dependence with Cigarettes  - Smokes 1 pck/3days  - Declined nicotine patch  - Discussed outpatient options for assistance with tobacco cessation including chantix and outpatient programs    Hypokalemia  - IVF with KCl  - Electrolyte Replacement Protocol  - Daily BMP    Elevated LFTs  - Suspect secondary to etoh abuse  - IVF  - Daily Hepatic Panel    Thrombocytopenia  - Suspect secondary to etoh abuse  - Daily CBC    Hypertension  - Resume home antihypertensives once confirmed by pharmacy  - Hydralazine prn    Peripheral Neuropathy  - Suspect secondary to etoh abuse  - Discussed the importance of etoh cessation  - Gabapentin 200 mg BID    Chronic Medical Problems:  Sarcoidosis  Hx of Subdural Hematoma    PLAN: Resume home medications as appropriate once confirmed by pharmacy.     DVT Prophylaxis: Enoxaparin (Lovenox) SQ  Code Status: Full Code  Discharge Plan: Expected Discharge: 1-2 days  Anticipated discharge location: Home    Bobby Rajput DO    Primary Care Physician   Physician No Ref-Primary    -----------------------------------------------------------------------------------------------------------------------------------------------------------------------------------------------------    Past Medical History    I have reviewed this patient's medical history and updated it with pertinent information if needed.   Past Medical History:   Diagnosis Date     Alcohol abuse      Diabetes (H)    Hypertension  Peripheral Neuropathy  Sarcoidosis    Past Surgical History   I have reviewed this patient's surgical history and updated it with pertinent information if  needed.  Cholecystectomy   x1    Prior to Admission Medications   Prior to Admission Medications   Prescriptions Last Dose Informant Patient Reported? Taking?   aspirin (ASA) 81 MG chewable tablet   Yes No   Sig: Take 81 mg by mouth daily   carvedilol (COREG) 25 MG tablet   No No   Sig: Take 1 tablet (25 mg) by mouth 2 times daily (with meals) for 14 days   losartan (COZAAR) 100 MG tablet   No No   Sig: Take 1 tablet (100 mg) by mouth daily for 14 days      Facility-Administered Medications: None     Allergies   No Known Allergies    Social History   I have reviewed this patient's social history and updated it with pertinent information if needed. Jose Corral  reports that she has been smoking. She has never used smokeless tobacco. She reports current alcohol use. She reports previous drug use.  Smokes 1 pck every 3 days.  Drinks 1 L gin every 2 days.      Family History   I have reviewed this patient's family history and updated it with pertinent information if needed.   Mom - Heart disease, Diabetes  Brother - Heart disease  -----------------------------------------------------------------------------------------------------------------------------------------------------------------------------------------------------    Review of Systems   The 10 point Review of Systems is negative other than noted in the HPI or here.     Physical Exam   Temp: 98.6  F (37  C) Temp src: Oral BP: (!) 174/116 Pulse: 94   Resp: 12 SpO2: 98 % O2 Device: None (Room air)    Vital Signs with Ranges  Temp:  [98.6  F (37  C)] 98.6  F (37  C)  Pulse:  [86-99] 94  Resp:  [12-19] 12  BP: (138-174)/() 174/116  SpO2:  [93 %-99 %] 98 %  0 lbs 0 oz    Constitutional: Awake, alert, cooperative, no apparent distress.  Eyes: Conjunctiva and pupils examined and normal.  HEENT: Moist mucous membranes, normal dentition.  Respiratory: Clear to auscultation bilaterally, no crackles or wheezing.  Cardiovascular: Regular rate and  rhythm, normal S1 and S2, and no murmur noted.  GI: Soft, non-distended, non-tender, normal bowel sounds.  Lymph/Hematologic: No anterior cervical or supraclavicular adenopathy.  Skin: No rashes, no cyanosis, no edema.  Musculoskeletal: No joint swelling, erythema or tenderness.  Neurologic: Cranial nerves 2-12 intact, normal strength and sensation.  Psychiatric: Alert, oriented to person, place and time, no obvious anxiety or depression.     Data     Recent Labs   Lab 03/26/22  1646   WBC 6.4   HGB 12.0   *   *      POTASSIUM 2.8*   CHLORIDE 98   CO2 30   BUN 7   CR 0.53   ANIONGAP 9   SABINO 8.5   GLC 89   ALBUMIN 3.0*   PROTTOTAL 7.9   BILITOTAL 0.6   ALKPHOS 90   ALT 62*   *   LIPASE 1,428*       Recent Results (from the past 24 hour(s))   XR Chest 2 Views    Narrative    EXAM: XR CHEST 2 VW  LOCATION: [facility]  DATE/TIME: 3/26/2022 4:55 PM    INDICATION: cough  COMPARISON: 3/12/2021      Impression    IMPRESSION: Heart size and pulmonary vessels normal. Mild interstitial infiltrate lateral to right suprahilar region could relate to minimal pneumonitis. Lungs otherwise clear. No pleural effusion.  Consider follow-up exam in 6-8 weeks to reevaluate.

## 2022-03-26 NOTE — ED NOTES
"[facility]  ED Nurse Handoff Report    Jose Corral is a 51 year old female   ED Chief complaint: abnormal lab value  . ED Diagnosis:   Final diagnoses:   Alcohol-induced acute pancreatitis, unspecified complication status   Hypokalemia   Hypomagnesemia   Pneumonia due to infectious organism, unspecified laterality, unspecified part of lung     Allergies: No Known Allergies    Code Status: Full Code  Activity level - Baseline/Home:  Independent. Activity Level - Current:   Assist X 1. Lift room needed: No. Bariatric: No   Needed: No   Isolation: No. Infection: Not Applicable.     Vital Signs:   Vitals:    03/26/22 1645 03/26/22 1650 03/26/22 1745 03/26/22 1800   BP: (!) 149/97  (!) 174/116    Pulse:  95 86 94   Resp:   19 12   Temp:       TempSrc:       SpO2:  99% 94% 98%       Cardiac Rhythm:  ,   Cardiac  Cardiac Rhythm: Normal sinus rhythm  Pain level:    Patient confused: No. Patient Falls Risk: Yes.   Elimination Status: Has voided   Patient Report - Initial Complaint: Abnormal lab value.   Focused Assessment: HPI   Jose Corral is a 51 year old female with history of hypertension and asthma who presents with a cough. For the past month, the patient has been experiencing numbness in her feet that has made it difficult for her to walk. She has never experienced this numbness before. Two weeks ago the patient developed a productive cough. A week ago, she developed fatigue, diarrhea, headache and emesis. She states she experienced diarrhea \"all day\" and her last episode of emesis was two days ago. She states she has lost her appetite but denies any abdominal pain. Due to these symptoms she presented to . She was called two days ago due to her abnormal liver values during this visit, and she was advised to present to the ED. Today, she called EMS due to her abnormal liver value. With EMS she was wheezing and had an O2 sat of 89% on room air. She was given 1 duoneb which seemed " to help her symptoms. Of note, she drinks one bottle of liquor in a day multiple times a week. She does smoke cigarettes.      Review of Systems   Constitutional: Positive for appetite change and fever.   Respiratory: Positive for cough (productive).    Gastrointestinal: Positive for diarrhea and vomiting. Negative for abdominal pain.   Neurological: Positive for numbness (bilateral feet) and headaches.   All other systems reviewed and are negative.      Tests Performed: labs, imaging. Abnormal Results:   Labs Ordered and Resulted from Time of ED Arrival to Time of ED Departure   COMPREHENSIVE METABOLIC PANEL - Abnormal       Result Value    Sodium 137      Potassium 2.8 (*)     Chloride 98      Carbon Dioxide (CO2) 30      Anion Gap 9      Urea Nitrogen 7      Creatinine 0.53      Calcium 8.5      Glucose 89      Alkaline Phosphatase 90       (*)     ALT 62 (*)     Protein Total 7.9      Albumin 3.0 (*)     Bilirubin Total 0.6      GFR Estimate >90     LIPASE - Abnormal    Lipase 1,428 (*)    ROUTINE UA WITH MICROSCOPIC REFLEX TO CULTURE - Abnormal    Color Urine Yellow      Appearance Urine Cloudy (*)     Glucose Urine Negative      Bilirubin Urine Negative      Ketones Urine Trace (*)     Specific Gravity Urine 1.016      Blood Urine Trace (*)     pH Urine 6.5      Protein Albumin Urine 100  (*)     Urobilinogen Urine 2.0      Nitrite Urine Negative      Leukocyte Esterase Urine Small (*)     Bacteria Urine Few (*)     Mucus Urine Present (*)     RBC Urine 7 (*)     WBC Urine 12 (*)     Squamous Epithelials Urine 87 (*)    CBC WITH PLATELETS AND DIFFERENTIAL - Abnormal    WBC Count 6.4      RBC Count 3.30 (*)     Hemoglobin 12.0      Hematocrit 33.3 (*)      (*)     MCH 36.4 (*)     MCHC 36.0      RDW 14.7      Platelet Count 112 (*)     % Neutrophils 73      % Lymphocytes 14      % Monocytes 13      % Eosinophils 0      % Basophils 0      % Immature Granulocytes 0      NRBCs per 100 WBC 0       Absolute Neutrophils 4.7      Absolute Lymphocytes 0.9      Absolute Monocytes 0.8      Absolute Eosinophils 0.0      Absolute Basophils 0.0      Absolute Immature Granulocytes 0.0      Absolute NRBCs 0.0     MAGNESIUM - Abnormal    Magnesium 1.3 (*)    TROPONIN I - Normal    Troponin I High Sensitivity 13     HCG QUALITATIVE PREGNANCY - Normal    hCG Serum Qualitative Negative     INFLUENZA A/B & SARS-COV2 PCR MULTIPLEX - Normal    Influenza A PCR Negative      Influenza B PCR Negative      SARS CoV2 PCR Negative     NT PROBNP INPATIENT - Normal    N terminal Pro BNP Inpatient 146     ETHYL ALCOHOL LEVEL   URINE CULTURE   BLOOD CULTURE   BLOOD CULTURE     XR Chest 2 Views   Final Result   IMPRESSION: Heart size and pulmonary vessels normal. Mild interstitial infiltrate lateral to right suprahilar region could relate to minimal pneumonitis. Lungs otherwise clear. No pleural effusion.   Consider follow-up exam in 6-8 weeks to reevaluate.        .   Treatments provided: See MAR  Family Comments: NA  OBS brochure/video discussed/provided to patient:  N/A  ED Medications:   Medications   0.9% sodium chloride BOLUS (1,000 mLs Intravenous New Bag 3/26/22 1648)     Followed by   sodium chloride 0.9% infusion (has no administration in time range)   potassium chloride 10 mEq in 100 mL sterile water intermittent infusion (premix) (10 mEq Intravenous New Bag 3/26/22 1721)   cefTRIAXone (ROCEPHIN) 1 g vial to attach to  mL bag for ADULTS or NS 50 mL bag for PEDS (has no administration in time range)   azithromycin 500 mg (ZITHROMAX) in 0.9% NaCl 250 mL intermittent infusion 500 mg (has no administration in time range)   magnesium sulfate 2 g in water intermittent infusion (has no administration in time range)   potassium chloride (KLOR-CON) Packet 40 mEq (40 mEq Oral Given 3/26/22 1720)     Drips infusing:  Yes  For the majority of the shift, the patient's behavior Green. Interventions performed were NA.    Sepsis  treatment initiated: No     Patient tested for COVID 19 prior to admission: YES    ED Nurse Name/Phone Number: Evelia Mahan RN,   6:01 PM    RECEIVING UNIT ED HANDOFF REVIEW    Above ED Nurse Handoff Report was reviewed: Yes  Reviewed by: Samantha Schmitz RN on March 26, 2022 at 7:10 PM

## 2022-03-26 NOTE — ED TRIAGE NOTES
Pt arrives via EMS with complaints of abnormal liver values. Pt was drinking today. Per EMS pt was wheezy and 89% on RA. EMS gave 1 duoneb which seemed to help. ABCs intact.

## 2022-03-26 NOTE — PHARMACY-ADMISSION MEDICATION HISTORY
Admission medication history interview status for this patient is complete. See [facility] admission navigator for allergy information, prior to admission medications and immunization status.     Medication history interview done, indicate source(s): Patient  Medication history resources (including written lists, pill bottles, clinic record): patient, Sure Scripts, CareEverywhere  Pharmacy: [facility]    Changes made to PTA medication list:  Added: fexofenadine  Changed:   Reported as Not Taking: carvedilol and losartan--patient states she has not been able to take these medications due to not having insurance coverage.   Removed: aspirin     Actions taken by pharmacist (provider contacted, etc): left sticky to MD     Additional medication history information:None    Medication reconciliation/reorder completed by provider prior to medication history? N    Prior to Admission medications    Medication Sig Last Dose Taking? Auth Provider   Fexofenadine HCl (ALLEGRA ALLERGY PO) Take by mouth daily Past Week at Unknown time Yes Unknown, Entered By History   carvedilol (COREG) 25 MG tablet Take 1 tablet (25 mg) by mouth 2 times daily (with meals) for 14 days   Rahat Rajput DO   losartan (COZAAR) 100 MG tablet Take 1 tablet (100 mg) by mouth daily for 14 days   Rahat Rajput, DO

## 2022-03-26 NOTE — LETTER
[place] [address] [facility]  [address]  [facility] [address]  Phone: [phone]  Fax: [phone]    March 28, 2022        Jose Corral  [id number] [address] [address]          To whom it may concern:    RE: Jose Corral    Patient was seen and treated today at our Hospital from 3/26/2022-3/28/2022  She may return to Work on 3/30/2022          Sincerely,      Devan Velasquez MD  Hospitalist  [facility]

## 2022-03-26 NOTE — ED PROVIDER NOTES
"  History   Chief Complaint:  Cough    HPI   Jose Corral is a 51 year old female with history of hypertension and asthma who presents with a cough. For the past month, the patient has been experiencing numbness in her feet that has made it difficult for her to walk. She has never experienced this numbness before. Two weeks ago the patient developed a productive cough. A week ago, she developed fatigue, diarrhea, headache and emesis. She states she experienced diarrhea \"all day\" and her last episode of emesis was two days ago. She states she has lost her appetite but denies any abdominal pain. Due to these symptoms she presented to . She was called two days ago due to her abnormal liver values during this visit, and she was advised to present to the ED. Today, she called EMS due to her abnormal liver value. With EMS she was wheezing and had an O2 sat of 89% on room air. She was given 1 duoneb which seemed to help her symptoms. Of note, she drinks one bottle of liquor in a day multiple times a week. She does smoke cigarettes.     Review of Systems   Constitutional: Positive for appetite change and fever.   Respiratory: Positive for cough (productive).    Gastrointestinal: Positive for diarrhea and vomiting. Negative for abdominal pain.   Neurological: Positive for numbness (bilateral feet) and headaches.   All other systems reviewed and are negative.    Allergies:  The patient has no known allergies.     Medications:  Coreg  Cozaar    Past Medical History:    Alcohol abuse  Sarcoidosis  Obesity  Asthma  Diabetes  Hypertension  Allergic rhinitis   Carpal tunnel syndrome  Dysfunctional uterine bleeding  Trichomonal vaginitis  Subdural hematoma  Depression  Suicidal ideations  Palpitations     Past Surgical History:    Cholecystectomy    Family History:    Brother: Heart disease  Father: Diabetes, Hyperlipidemia  Mother: Diabetes, Heart disease, Hyperlipidemia    Social History:  The patient was accompanied to " the ED by EMS.  The patient smokes cigarettes.  The patient drinks alcohol multiple times a week.    Physical Exam     Patient Vitals for the past 24 hrs:   BP Temp Temp src Pulse Resp SpO2   03/26/22 1800 -- -- -- 94 12 98 %   03/26/22 1745 (!) 174/116 -- -- 86 19 94 %   03/26/22 1650 -- -- -- 95 -- 99 %   03/26/22 1645 (!) 149/97 -- -- -- -- --   03/26/22 1615 -- -- -- -- -- 99 %   03/26/22 1601 138/89 -- -- -- -- --   03/26/22 1600 138/89 -- -- 99 -- 94 %   03/26/22 1559 -- 98.6  F (37  C) Oral 94 17 93 %       Physical Exam  Constitutional:       General: She is not in acute distress.     Appearance: She is not diaphoretic.   HENT:      Head: Atraumatic.      Mouth/Throat:      Mouth: Mucous membranes are dry.      Pharynx: No oropharyngeal exudate.   Eyes:      General: No scleral icterus.     Pupils: Pupils are equal, round, and reactive to light.   Cardiovascular:      Pulses: Normal pulses.      Heart sounds: Normal heart sounds.   Pulmonary:      Effort: Pulmonary effort is normal. No respiratory distress.      Breath sounds: Normal breath sounds.      Comments: Coughing during exam  Abdominal:      General: Bowel sounds are normal.      Palpations: Abdomen is soft.      Tenderness: There is no abdominal tenderness.   Musculoskeletal:         General: No tenderness.      Comments: Trace edema around ankles bilaterally  Strength in legs intact  Reflex 1+ at patella   Skin:     General: Skin is warm.      Findings: No rash.   Neurological:      General: No focal deficit present.      Mental Status: She is oriented to person, place, and time. Mental status is at baseline.       Emergency Department Course   ECG:  ECG taken at 1603, ECG read at 1610  Normal sinus rhythm  Left ventricular hypertrophy with repolarization abnormality  Abnormal ECG  No ST changes  No significant change compared to EKG dated 04/27/2021  Rate 89 bpm. CA interval 156 ms. QRS duration 102 ms. QT/QTc 392/476 ms. P-R-T axes 46 25  35.    Imaging:  XR Chest 2 Views   Final Result   IMPRESSION: Heart size and pulmonary vessels normal. Mild interstitial infiltrate lateral to right suprahilar region could relate to minimal pneumonitis. Lungs otherwise clear. No pleural effusion.   Consider follow-up exam in 6-8 weeks to reevaluate.          Laboratory:  Labs Ordered and Resulted from Time of ED Arrival to Time of ED Departure   COMPREHENSIVE METABOLIC PANEL - Abnormal       Result Value    Sodium 137      Potassium 2.8 (*)     Chloride 98      Carbon Dioxide (CO2) 30      Anion Gap 9      Urea Nitrogen 7      Creatinine 0.53      Calcium 8.5      Glucose 89      Alkaline Phosphatase 90       (*)     ALT 62 (*)     Protein Total 7.9      Albumin 3.0 (*)     Bilirubin Total 0.6      GFR Estimate >90     LIPASE - Abnormal    Lipase 1,428 (*)    ROUTINE UA WITH MICROSCOPIC REFLEX TO CULTURE - Abnormal    Color Urine Yellow      Appearance Urine Cloudy (*)     Glucose Urine Negative      Bilirubin Urine Negative      Ketones Urine Trace (*)     Specific Gravity Urine 1.016      Blood Urine Trace (*)     pH Urine 6.5      Protein Albumin Urine 100  (*)     Urobilinogen Urine 2.0      Nitrite Urine Negative      Leukocyte Esterase Urine Small (*)     Bacteria Urine Few (*)     Mucus Urine Present (*)     RBC Urine 7 (*)     WBC Urine 12 (*)     Squamous Epithelials Urine 87 (*)    CBC WITH PLATELETS AND DIFFERENTIAL - Abnormal    WBC Count 6.4      RBC Count 3.30 (*)     Hemoglobin 12.0      Hematocrit 33.3 (*)      (*)     MCH 36.4 (*)     MCHC 36.0      RDW 14.7      Platelet Count 112 (*)     % Neutrophils 73      % Lymphocytes 14      % Monocytes 13      % Eosinophils 0      % Basophils 0      % Immature Granulocytes 0      NRBCs per 100 WBC 0      Absolute Neutrophils 4.7      Absolute Lymphocytes 0.9      Absolute Monocytes 0.8      Absolute Eosinophils 0.0      Absolute Basophils 0.0      Absolute Immature Granulocytes 0.0       Absolute NRBCs 0.0     MAGNESIUM - Abnormal    Magnesium 1.3 (*)    TROPONIN I - Normal    Troponin I High Sensitivity 13     HCG QUALITATIVE PREGNANCY - Normal    hCG Serum Qualitative Negative     INFLUENZA A/B & SARS-COV2 PCR MULTIPLEX - Normal    Influenza A PCR Negative      Influenza B PCR Negative      SARS CoV2 PCR Negative     NT PROBNP INPATIENT - Normal    N terminal Pro BNP Inpatient 146     ETHYL ALCOHOL LEVEL   URINE CULTURE   BLOOD CULTURE   BLOOD CULTURE        Emergency Department Course:  Mental Health Risk Assessment      PSS-3    Date and Time Over the past 2 weeks have you felt down, depressed, or hopeless? Over the past 2 weeks have you had thoughts of killing yourself? Have you ever attempted to kill yourself? When did this last happen? User   03/26/22 1600 yes no yes more than 6 months ago STW            Reviewed:  I reviewed nursing notes, vitals, past medical history and care everywhere    Assessments:  1605 I obtained history and examined the patient as noted above.      I rechecked and updated the patient regarding the imaging results, laboratory results and the plan for care.    Interventions:  Medications   0.9% sodium chloride BOLUS (1,000 mLs Intravenous New Bag 3/26/22 1648)     Followed by   sodium chloride 0.9% infusion (has no administration in time range)   potassium chloride 10 mEq in 100 mL sterile water intermittent infusion (premix) (10 mEq Intravenous New Bag 3/26/22 1721)   cefTRIAXone (ROCEPHIN) 1 g vial to attach to  mL bag for ADULTS or NS 50 mL bag for PEDS (has no administration in time range)   azithromycin 500 mg (ZITHROMAX) in 0.9% NaCl 250 mL intermittent infusion 500 mg (has no administration in time range)   magnesium sulfate 2 g in water intermittent infusion (has no administration in time range)   potassium chloride (KLOR-CON) Packet 40 mEq (40 mEq Oral Given 3/26/22 1720)     Disposition:  The patient was admitted to the hospital under the care of   Regulo.     Impression & Plan     Medical Decision Making:  This patient is a 51-year-old woman who presents with multiple complaints.  First she says that she has been coughing over the last 2 weeks.  She was noted to be hypoxic in the low 80s prehospital.  She has coughing and wheezing here in the ED.  COVID-19 test is negative but x-ray appears to show an infiltrate.  She will be covered for community-acquired pneumonia.    The patient also has been vomiting and has had lower chest pain.  She appears to have pancreatitis.  She drinks a liter of hard liquor most days.  She was drinking earlier today.  She also has significant electrolyte abnormalities including hypomagnesemia and hypokalemia.  This is being corrected.  The patient is being hydrated.    Given her numerous acute findings today she will be admitted to the hospital for management of pneumonia, pancreatitis, and electrolyte replacement.    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    Diagnosis:    ICD-10-CM    1. Alcohol-induced acute pancreatitis, unspecified complication status  K85.20    2. Hypokalemia  E87.6    3. Hypomagnesemia  E83.42    4. Pneumonia due to infectious organism, unspecified laterality, unspecified part of lung  J18.9        Discharge Medications:  New Prescriptions    No medications on file       Scribe Disclosure:  I, Wan Pineda, am serving as a scribe at 3:56 PM on 3/26/2022 to document services personally performed by Rolf Rivas MD based on my observations and the provider's statements to me.      Rolf Rivas MD  03/26/22 9537

## 2022-03-26 NOTE — ED NOTES
DATE:  3/26/2022   TIME OF RECEIPT FROM LAB:  1809  LAB TEST:  ETOH  LAB VALUE:  0.33  RESULTS GIVEN WITH READ-BACK TO (PROVIDER):  Rolf Rivas, *  TIME LAB VALUE REPORTED TO PROVIDER:   1810

## 2022-03-27 ENCOUNTER — APPOINTMENT (OUTPATIENT)
Dept: ULTRASOUND IMAGING | Facility: CLINIC | Age: 52
DRG: 193 | End: 2022-03-27
Attending: INTERNAL MEDICINE

## 2022-03-27 LAB
ALBUMIN SERPL-MCNC: 2.8 G/DL (ref 3.4–5)
ALP SERPL-CCNC: 91 U/L (ref 40–150)
ALT SERPL W P-5'-P-CCNC: 49 U/L (ref 0–50)
ANION GAP SERPL CALCULATED.3IONS-SCNC: 5 MMOL/L (ref 3–14)
AST SERPL W P-5'-P-CCNC: 128 U/L (ref 0–45)
BILIRUB DIRECT SERPL-MCNC: 0.2 MG/DL (ref 0–0.2)
BILIRUB SERPL-MCNC: 0.9 MG/DL (ref 0.2–1.3)
BUN SERPL-MCNC: 7 MG/DL (ref 7–30)
CALCIUM SERPL-MCNC: 8.5 MG/DL (ref 8.5–10.1)
CHLORIDE BLD-SCNC: 101 MMOL/L (ref 94–109)
CO2 SERPL-SCNC: 30 MMOL/L (ref 20–32)
CREAT SERPL-MCNC: 0.5 MG/DL (ref 0.52–1.04)
ERYTHROCYTE [DISTWIDTH] IN BLOOD BY AUTOMATED COUNT: 15.2 % (ref 10–15)
FOLATE SERPL-MCNC: 21.3 NG/ML
GFR SERPL CREATININE-BSD FRML MDRD: >90 ML/MIN/1.73M2
GLUCOSE BLD-MCNC: 126 MG/DL (ref 70–99)
HCT VFR BLD AUTO: 31.2 % (ref 35–47)
HGB BLD-MCNC: 11.2 G/DL (ref 11.7–15.7)
INR PPP: 1.05 (ref 0.85–1.15)
MAGNESIUM SERPL-MCNC: 1.5 MG/DL (ref 1.6–2.3)
MCH RBC QN AUTO: 35.9 PG (ref 26.5–33)
MCHC RBC AUTO-ENTMCNC: 35.9 G/DL (ref 31.5–36.5)
MCV RBC AUTO: 100 FL (ref 78–100)
PLATELET # BLD AUTO: 118 10E3/UL (ref 150–450)
POTASSIUM BLD-SCNC: 3.7 MMOL/L (ref 3.4–5.3)
PROT SERPL-MCNC: 7.5 G/DL (ref 6.8–8.8)
RBC # BLD AUTO: 3.12 10E6/UL (ref 3.8–5.2)
SODIUM SERPL-SCNC: 136 MMOL/L (ref 133–144)
VIT B12 SERPL-MCNC: 691 PG/ML (ref 193–986)
WBC # BLD AUTO: 7.6 10E3/UL (ref 4–11)

## 2022-03-27 PROCEDURE — 36415 COLL VENOUS BLD VENIPUNCTURE: CPT | Performed by: INTERNAL MEDICINE

## 2022-03-27 PROCEDURE — 82248 BILIRUBIN DIRECT: CPT | Performed by: INTERNAL MEDICINE

## 2022-03-27 PROCEDURE — 250N000011 HC RX IP 250 OP 636: Performed by: INTERNAL MEDICINE

## 2022-03-27 PROCEDURE — 82746 ASSAY OF FOLIC ACID SERUM: CPT | Performed by: INTERNAL MEDICINE

## 2022-03-27 PROCEDURE — 76700 US EXAM ABDOM COMPLETE: CPT

## 2022-03-27 PROCEDURE — 99232 SBSQ HOSP IP/OBS MODERATE 35: CPT | Performed by: INTERNAL MEDICINE

## 2022-03-27 PROCEDURE — 83735 ASSAY OF MAGNESIUM: CPT | Performed by: INTERNAL MEDICINE

## 2022-03-27 PROCEDURE — 250N000013 HC RX MED GY IP 250 OP 250 PS 637: Performed by: INTERNAL MEDICINE

## 2022-03-27 PROCEDURE — 82607 VITAMIN B-12: CPT | Performed by: INTERNAL MEDICINE

## 2022-03-27 PROCEDURE — 85610 PROTHROMBIN TIME: CPT | Performed by: INTERNAL MEDICINE

## 2022-03-27 PROCEDURE — 85027 COMPLETE CBC AUTOMATED: CPT | Performed by: INTERNAL MEDICINE

## 2022-03-27 PROCEDURE — 82310 ASSAY OF CALCIUM: CPT | Performed by: INTERNAL MEDICINE

## 2022-03-27 PROCEDURE — 120N000001 HC R&B MED SURG/OB

## 2022-03-27 RX ORDER — CARVEDILOL 25 MG/1
25 TABLET ORAL 2 TIMES DAILY WITH MEALS
Status: DISCONTINUED | OUTPATIENT
Start: 2022-03-27 | End: 2022-03-28 | Stop reason: HOSPADM

## 2022-03-27 RX ORDER — GABAPENTIN 100 MG/1
200 CAPSULE ORAL 2 TIMES DAILY
Status: DISCONTINUED | OUTPATIENT
Start: 2022-03-27 | End: 2022-03-27

## 2022-03-27 RX ORDER — MAGNESIUM SULFATE HEPTAHYDRATE 40 MG/ML
4 INJECTION, SOLUTION INTRAVENOUS ONCE
Status: COMPLETED | OUTPATIENT
Start: 2022-03-27 | End: 2022-03-27

## 2022-03-27 RX ORDER — LOSARTAN POTASSIUM 100 MG/1
100 TABLET ORAL DAILY
Status: DISCONTINUED | OUTPATIENT
Start: 2022-03-27 | End: 2022-03-28 | Stop reason: HOSPADM

## 2022-03-27 RX ORDER — FEXOFENADINE HCL 180 MG/1
180 TABLET ORAL DAILY
Status: DISCONTINUED | OUTPATIENT
Start: 2022-03-27 | End: 2022-03-28 | Stop reason: HOSPADM

## 2022-03-27 RX ADMIN — FEXOFENADINE HCL 180 MG: 180 TABLET ORAL at 13:58

## 2022-03-27 RX ADMIN — CLONIDINE HYDROCHLORIDE 0.1 MG: 0.1 TABLET ORAL at 07:59

## 2022-03-27 RX ADMIN — Medication 2500 MCG: at 14:06

## 2022-03-27 RX ADMIN — MAGNESIUM SULFATE HEPTAHYDRATE 4 G: 40 INJECTION, SOLUTION INTRAVENOUS at 11:43

## 2022-03-27 RX ADMIN — POTASSIUM CHLORIDE AND SODIUM CHLORIDE: 900; 150 INJECTION, SOLUTION INTRAVENOUS at 05:20

## 2022-03-27 RX ADMIN — Medication 400 MG: at 07:59

## 2022-03-27 RX ADMIN — GABAPENTIN 200 MG: 100 CAPSULE ORAL at 07:58

## 2022-03-27 RX ADMIN — MULTIPLE VITAMINS W/ MINERALS TAB 1 TABLET: TAB at 08:00

## 2022-03-27 RX ADMIN — FOLIC ACID 1 MG: 1 TABLET ORAL at 07:59

## 2022-03-27 RX ADMIN — CEFTRIAXONE 1 G: 1 INJECTION, POWDER, FOR SOLUTION INTRAMUSCULAR; INTRAVENOUS at 18:29

## 2022-03-27 RX ADMIN — AZITHROMYCIN MONOHYDRATE 250 MG: 250 TABLET ORAL at 08:00

## 2022-03-27 RX ADMIN — OXYCODONE HYDROCHLORIDE 5 MG: 5 TABLET ORAL at 08:00

## 2022-03-27 RX ADMIN — LORAZEPAM 1 MG: 1 TABLET ORAL at 01:00

## 2022-03-27 RX ADMIN — CARVEDILOL 25 MG: 25 TABLET, FILM COATED ORAL at 13:58

## 2022-03-27 RX ADMIN — PANTOPRAZOLE SODIUM 40 MG: 40 TABLET, DELAYED RELEASE ORAL at 07:59

## 2022-03-27 RX ADMIN — THIAMINE HCL TAB 100 MG 100 MG: 100 TAB at 07:59

## 2022-03-27 RX ADMIN — HYDRALAZINE HYDROCHLORIDE 10 MG: 20 INJECTION INTRAMUSCULAR; INTRAVENOUS at 13:10

## 2022-03-27 RX ADMIN — LOSARTAN POTASSIUM 100 MG: 100 TABLET, FILM COATED ORAL at 13:58

## 2022-03-27 RX ADMIN — POTASSIUM CHLORIDE 20 MEQ: 1500 TABLET, EXTENDED RELEASE ORAL at 00:25

## 2022-03-27 ASSESSMENT — ACTIVITIES OF DAILY LIVING (ADL)
TOILETING_ISSUES: NO
ADLS_ACUITY_SCORE: 5
ADLS_ACUITY_SCORE: 5
ADLS_ACUITY_SCORE: 12
WALKING_OR_CLIMBING_STAIRS_DIFFICULTY: NO
ADLS_ACUITY_SCORE: 5
CHANGE_IN_FUNCTIONAL_STATUS_SINCE_ONSET_OF_CURRENT_ILLNESS/INJURY: NO
ADLS_ACUITY_SCORE: 5
CONCENTRATING,_REMEMBERING_OR_MAKING_DECISIONS_DIFFICULTY: NO
ADLS_ACUITY_SCORE: 5
ADLS_ACUITY_SCORE: 5
ADLS_ACUITY_SCORE: 12
WEAR_GLASSES_OR_BLIND: NO
ADLS_ACUITY_SCORE: 5
ADLS_ACUITY_SCORE: 5
DOING_ERRANDS_INDEPENDENTLY_DIFFICULTY: NO
ADLS_ACUITY_SCORE: 5
ADLS_ACUITY_SCORE: 5
DIFFICULTY_COMMUNICATING: NO
FALL_HISTORY_WITHIN_LAST_SIX_MONTHS: NO
HEARING_DIFFICULTY_OR_DEAF: NO
ADLS_ACUITY_SCORE: 5
DIFFICULTY_EATING/SWALLOWING: NO
ADLS_ACUITY_SCORE: 5
DRESSING/BATHING_DIFFICULTY: NO
ADLS_ACUITY_SCORE: 12

## 2022-03-27 NOTE — PLAN OF CARE
Goal Outcome Evaluation:          End of Shift Summary  For vital signs and complete assessments, please see documentation flowsheets.     Pertinent assessments: A&Ox4. Ambulating independently in room. CIWA protocol - scoring 2-3. Mild tremors. BLE pain, sharp. BLE edema, legs elevated. Hypertensive - SBP in the 200s, DBP in the 100s. PRN hydralazine given and PO antihypertensives given per order. Last /80.      Major Shift Events: Diet advanced from clears to regular this afternoon. Now NPO for abdominal US this evening.     Treatment Plan: zithromax, rocephin, JASON, pain management.

## 2022-03-27 NOTE — PLAN OF CARE
Pertinent assessments: A&Ox4. Elevated Bp's. LS dim, shortness of breath. C/o BLE pain, declined intervention. Denies nausea. C/o BLE numbness and tingling. CIWA: 2. Up SBA.      Major Shift Events: admitted to floor    Treatment Plan: zithromax, rocephin, CIWA, pain management    Bedside Nurse: Samantha Schmitz RN

## 2022-03-27 NOTE — PROGRESS NOTES
[facility]    Medicine Progress Note - Hospitalist Service    Date of Admission:  3/26/2022    Assessment & Plan          Jose Corral is a 51 year old female with past medical history of alcohol abuse, hypertension, asthma, sarcoidosis, active nicotine dependence with cigarettes who presented on 3/26/2022 with chief complaint of cough.  The patient reported that approximately 2 weeks ago she developed a productive cough.  She is also had fatigue, headache, diarrhea, and one episode of emesis approximately 3 days prior to admission.  She also reports loss in appetite.  She denied any current abdominal pain.  On 3/21, she presented to urgent care who kendall lab work and found her to have lab abnormalities and so she was called and told to come to the emergency department.  She reported subjective chills over the last 2 weeks as well.  She reports that 1 L of gin will last her approximately 2 days.  Her last drink was 1 day PTA by her report     In the emergency department the patient found have a temperature of 98.6  F, heart rate 94, blood pressure 138/89, respiratory rate 17, SPO2 93% on room air.  Lab work revealed hypokalemia of 2.8, albumin 3.0, AST//62, lipase 1428, platelet 112.  The patient had a negative influenza and Covid 19 PCR.  Chest x-ray revealed mild interstitial infiltrate lateral to the right suprahilar region.  The patient was started on azithromycin and ceftriaxone with concern for developing community-acquired pneumonia.  The patient was provided a DuoNeb treatment by EMS with improvement in some mild wheezing they saw on exam.  The patient was started on CIWA protocol, IV fluids, and a clear liquid diet.     ASSESSMENT/PLAN     Cough  Community Acquired Pneumonia  Asthma/COPD  - Start Azithromycin and ceftriaxone for 5 days  - Dose not appear to have acute exacerbation  - Duonebs prn  - Pt would benefit from Albuterol inhaler at discharge  -remains afebrile, O2  sat ok     Acute Alcohol Intoxication  Alcohol Abuse  - Etoh level = 0.33 on admission  - Drinks 1 L of Gin every 2 days  - CIWA with Ativan prn in place  - MV, FA, Thiamine, will add B12 at her request  - IVF     Alcohol Pancreatitis  - Clear liquid diet on admission  - IVF  - Pain control prn, no pain present, will advance diet     Active Nicotine Dependence with Cigarettes  - Smokes 1 pck/3days  - Declined nicotine patch  - Discussed outpatient options for assistance with tobacco cessation including chantix and outpatient programs     Hypokalemia  - IVF with KCl  - Electrolyte Replacement Protocol  - Daily BMP  -normal this am     Elevated LFTs  - Suspect secondary to etoh abuse pattern consistent  - IVF  - Daily Hepatic Panel     Thrombocytopenia  - Suspect secondary to etoh abuse  - Daily CBC  -stable at 118     Hypertension  -significantly elevated this am  - Resumed home antihypertensives presently  - Hydralazine prn     Peripheral Neuropathy  - Suspect secondary to etoh abuse  - Discussed the importance of etoh cessation  - Gabapentin 200 mg BID  Hypomagnesemia  -mild, replace as well  Hypoalbuminemia   -likely secondary to poor nutrition.Ethoh as well  -advance diet     Chronic Medical Problems:  Sarcoidosis  Hx of Subdural Hematoma          Diet: Clear Liquid Diet  Advance to regular  DVT Prophylaxis: Enoxaparin (Lovenox) SQ  Sanchez Catheter: Not present  Central Lines: None  Cardiac Monitoring: ACTIVE order. Indication: ciwa  Code Status: Full Code      Disposition Plan   Expected Discharge: 03/29/2022     Anticipated discharge location:  home       The patient's care was discussed with the Patient.    Devan Velasquez MD  Hospitalist Service  [facility]  Securely message with the Vocera Web Console (learn more here)  Text page via PadProof Paging/Directory         Clinically Significant Risk Factors Present on Admission           # Hypomagnesemia: Mg = 1.5 mg/dL (Ref range: 1.6 - 2.3  "mg/dL) on admission, will replace as needed   # Hypoalbuminemia: Albumin = 2.8 g/dL (Ref range: 3.4 - 5.0 g/dL) on admission, will monitor as appropriate    # Thrombocytopenia: Plts = 118 10e3/uL (Ref range: 150 - 450 10e3/uL) on admission, will monitor for bleeding   # Obesity: Estimated body mass index is 31.98 kg/m  as calculated from the following:    Height as of this encounter: 1.626 m (5' 4\").    Weight as of this encounter: 84.5 kg (186 lb 4.8 oz).      ______________________________________________________________________    Interval History   Patient seen and examined, admitted last evening with CAP, hypokalemia, elevated lipase, has not experienced any abdominal pain. Continues to have cough, denies CP or SOB. Complains of bilateral LE numbness    Data reviewed today: I reviewed all medications, new labs and imaging results over the last 24 hours.     Physical Exam   Vital Signs: Temp: 98.6  F (37  C) Temp src: Oral BP: (!) 205/116 Pulse: 88   Resp: 18 SpO2: 99 % O2 Device: None (Room air)    Weight: 186 lbs 4.8 oz  Constitutional: awake, alert, cooperative, no apparent distress, and appears stated age  Respiratory: Has rales at base to mid lung field on right and occasional bilateral expiratory wheeze  Cardiovascular: Normal apical impulse, regular rate and rhythm, normal S1 and S2, no S3 or S4, and no murmur noted  GI: No scars, normal bowel sounds, soft, non-distended, non-tender, no masses palpated, no hepatosplenomegally  Neurologic: has decrease sensation peripheral LE to mid calf in a stocking distribution  Ext: no C/C/E    Data   Recent Labs   Lab 03/27/22  0752 03/26/22  2208 03/26/22  1958 03/26/22  1646   WBC 7.6  --   --  6.4   HGB 11.2*  --   --  12.0     --   --  101*   *  --   --  112*   INR 1.05  --   --   --      --   --  137   POTASSIUM 3.7 3.0*  --  2.8*   CHLORIDE 101  --   --  98   CO2 30  --   --  30   BUN 7  --   --  7   CR 0.50*  --  0.49* 0.53   ANIONGAP 5  --  "  --  9   SABINO 8.5  --   --  8.5   *  --   --  89   ALBUMIN 2.8*  --   --  3.0*   PROTTOTAL 7.5  --   --  7.9   BILITOTAL 0.9  --   --  0.6   ALKPHOS 91  --   --  90   ALT 49  --   --  62*   *  --   --  202*   LIPASE  --   --   --  1,428*     Recent Results (from the past 24 hour(s))   XR Chest 2 Views    Narrative    EXAM: XR CHEST 2 VW  LOCATION: [facility]  DATE/TIME: 3/26/2022 4:55 PM    INDICATION: cough  COMPARISON: 3/12/2021      Impression    IMPRESSION: Heart size and pulmonary vessels normal. Mild interstitial infiltrate lateral to right suprahilar region could relate to minimal pneumonitis. Lungs otherwise clear. No pleural effusion.  Consider follow-up exam in 6-8 weeks to reevaluate.     Medications     0.9% sodium chloride + KCl 20 mEq/L 125 mL/hr at 03/27/22 0520       azithromycin  250 mg Oral Daily     carvedilol  25 mg Oral BID w/meals     cefTRIAXone  1 g Intravenous Q24H     cloNIDine  0.1 mg Oral BID     enoxaparin ANTICOAGULANT  40 mg Subcutaneous Q24H     fexofenadine  180 mg Oral Daily     folic acid  1 mg Oral Daily     gabapentin  200 mg Oral BID     losartan  100 mg Oral Daily     magnesium oxide  400 mg Oral TID     magnesium sulfate  4 g Intravenous Once     multivitamin w/minerals  1 tablet Oral Daily     pantoprazole  40 mg Oral QAM AC     sodium chloride (PF)  3 mL Intracatheter Q8H     thiamine  100 mg Oral Daily

## 2022-03-27 NOTE — PLAN OF CARE
End of Shift Summary  For vital signs and complete assessments, please see documentation flowsheets.     Pertinent assessments: A&Ox4. Elevated Bp's. LS dim, CURRY. C/o BLE pain, declined intervention. Denies nausea. C/O BLE numbness and tingling. CIWA: 8, ativan given, recheck score 0, pt sleeping. Up SBA and walker     Major Shift Events: Uneventful    Treatment Plan: zithromax, rocephin, CIWA, pain management    Bedside Nurse: Марина Rider RN

## 2022-03-28 VITALS
SYSTOLIC BLOOD PRESSURE: 126 MMHG | DIASTOLIC BLOOD PRESSURE: 87 MMHG | RESPIRATION RATE: 18 BRPM | WEIGHT: 186.3 LBS | TEMPERATURE: 97.9 F | OXYGEN SATURATION: 100 % | HEART RATE: 77 BPM | BODY MASS INDEX: 31.8 KG/M2 | HEIGHT: 64 IN

## 2022-03-28 LAB
ALBUMIN SERPL-MCNC: 2.7 G/DL (ref 3.4–5)
ALP SERPL-CCNC: 88 U/L (ref 40–150)
ALT SERPL W P-5'-P-CCNC: 37 U/L (ref 0–50)
ANION GAP SERPL CALCULATED.3IONS-SCNC: 2 MMOL/L (ref 3–14)
AST SERPL W P-5'-P-CCNC: 57 U/L (ref 0–45)
ATRIAL RATE - MUSE: 89 BPM
BACTERIA UR CULT: NORMAL
BASOPHILS # BLD AUTO: 0 10E3/UL (ref 0–0.2)
BASOPHILS NFR BLD AUTO: 0 %
BILIRUB SERPL-MCNC: 0.9 MG/DL (ref 0.2–1.3)
BUN SERPL-MCNC: 8 MG/DL (ref 7–30)
CALCIUM SERPL-MCNC: 8.5 MG/DL (ref 8.5–10.1)
CHLORIDE BLD-SCNC: 104 MMOL/L (ref 94–109)
CO2 SERPL-SCNC: 31 MMOL/L (ref 20–32)
CREAT SERPL-MCNC: 0.55 MG/DL (ref 0.52–1.04)
DIASTOLIC BLOOD PRESSURE - MUSE: NORMAL MMHG
EOSINOPHIL # BLD AUTO: 0.1 10E3/UL (ref 0–0.7)
EOSINOPHIL NFR BLD AUTO: 3 %
ERYTHROCYTE [DISTWIDTH] IN BLOOD BY AUTOMATED COUNT: 15.1 % (ref 10–15)
GFR SERPL CREATININE-BSD FRML MDRD: >90 ML/MIN/1.73M2
GLUCOSE BLD-MCNC: 150 MG/DL (ref 70–99)
HCT VFR BLD AUTO: 30 % (ref 35–47)
HGB BLD-MCNC: 10.8 G/DL (ref 11.7–15.7)
IMM GRANULOCYTES # BLD: 0 10E3/UL
IMM GRANULOCYTES NFR BLD: 1 %
INTERPRETATION ECG - MUSE: NORMAL
LYMPHOCYTES # BLD AUTO: 0.7 10E3/UL (ref 0.8–5.3)
LYMPHOCYTES NFR BLD AUTO: 20 %
MAGNESIUM SERPL-MCNC: 2.2 MG/DL (ref 1.6–2.3)
MCH RBC QN AUTO: 36.4 PG (ref 26.5–33)
MCHC RBC AUTO-ENTMCNC: 36 G/DL (ref 31.5–36.5)
MCV RBC AUTO: 101 FL (ref 78–100)
MONOCYTES # BLD AUTO: 0.5 10E3/UL (ref 0–1.3)
MONOCYTES NFR BLD AUTO: 16 %
NEUTROPHILS # BLD AUTO: 2.1 10E3/UL (ref 1.6–8.3)
NEUTROPHILS NFR BLD AUTO: 60 %
NRBC # BLD AUTO: 0 10E3/UL
NRBC BLD AUTO-RTO: 0 /100
P AXIS - MUSE: 46 DEGREES
PLATELET # BLD AUTO: 144 10E3/UL (ref 150–450)
POTASSIUM BLD-SCNC: 4.2 MMOL/L (ref 3.4–5.3)
PR INTERVAL - MUSE: 156 MS
PROT SERPL-MCNC: 7.2 G/DL (ref 6.8–8.8)
QRS DURATION - MUSE: 102 MS
QT - MUSE: 392 MS
QTC - MUSE: 476 MS
R AXIS - MUSE: 25 DEGREES
RBC # BLD AUTO: 2.97 10E6/UL (ref 3.8–5.2)
SODIUM SERPL-SCNC: 137 MMOL/L (ref 133–144)
SYSTOLIC BLOOD PRESSURE - MUSE: NORMAL MMHG
T AXIS - MUSE: 35 DEGREES
VENTRICULAR RATE- MUSE: 89 BPM
WBC # BLD AUTO: 3.4 10E3/UL (ref 4–11)

## 2022-03-28 PROCEDURE — 99239 HOSP IP/OBS DSCHRG MGMT >30: CPT | Performed by: INTERNAL MEDICINE

## 2022-03-28 PROCEDURE — 36415 COLL VENOUS BLD VENIPUNCTURE: CPT | Performed by: INTERNAL MEDICINE

## 2022-03-28 PROCEDURE — 250N000011 HC RX IP 250 OP 636: Performed by: INTERNAL MEDICINE

## 2022-03-28 PROCEDURE — 999N000216 HC STATISTIC ADULT CD FACE TO FACE-NO CHRG

## 2022-03-28 PROCEDURE — 250N000013 HC RX MED GY IP 250 OP 250 PS 637: Performed by: INTERNAL MEDICINE

## 2022-03-28 PROCEDURE — 83735 ASSAY OF MAGNESIUM: CPT | Performed by: INTERNAL MEDICINE

## 2022-03-28 PROCEDURE — 80053 COMPREHEN METABOLIC PANEL: CPT | Performed by: INTERNAL MEDICINE

## 2022-03-28 PROCEDURE — 85025 COMPLETE CBC W/AUTO DIFF WBC: CPT | Performed by: INTERNAL MEDICINE

## 2022-03-28 RX ORDER — CEFUROXIME AXETIL 500 MG/1
500 TABLET ORAL EVERY 12 HOURS
Qty: 6 TABLET | Refills: 0 | Status: SHIPPED | OUTPATIENT
Start: 2022-03-28 | End: 2022-03-31

## 2022-03-28 RX ORDER — FOLIC ACID 1 MG/1
1 TABLET ORAL DAILY
Qty: 30 TABLET | Refills: 0 | Status: SHIPPED | OUTPATIENT
Start: 2022-03-29 | End: 2022-04-28

## 2022-03-28 RX ORDER — MULTIPLE VITAMINS W/ MINERALS TAB 9MG-400MCG
1 TAB ORAL DAILY
Qty: 30 TABLET | Refills: 0 | Status: SHIPPED | OUTPATIENT
Start: 2022-03-29 | End: 2022-04-28

## 2022-03-28 RX ORDER — GABAPENTIN 100 MG/1
200 CAPSULE ORAL 2 TIMES DAILY
Qty: 120 CAPSULE | Refills: 0 | Status: ON HOLD | OUTPATIENT
Start: 2022-03-28 | End: 2022-07-07

## 2022-03-28 RX ORDER — LOSARTAN POTASSIUM 100 MG/1
100 TABLET ORAL DAILY
Qty: 30 TABLET | Refills: 0 | Status: ON HOLD | OUTPATIENT
Start: 2022-03-28 | End: 2022-07-07

## 2022-03-28 RX ORDER — FEXOFENADINE HCL 180 MG/1
180 TABLET ORAL DAILY
Qty: 30 TABLET | Refills: 0 | Status: SHIPPED | OUTPATIENT
Start: 2022-03-29 | End: 2022-04-28

## 2022-03-28 RX ORDER — AZITHROMYCIN 250 MG/1
250 TABLET, FILM COATED ORAL DAILY
Qty: 3 TABLET | Refills: 0 | Status: SHIPPED | OUTPATIENT
Start: 2022-03-29 | End: 2022-04-01

## 2022-03-28 RX ORDER — ALBUTEROL SULFATE 90 UG/1
2 AEROSOL, METERED RESPIRATORY (INHALATION) EVERY 6 HOURS
Qty: 18 G | Refills: 0 | Status: ON HOLD | OUTPATIENT
Start: 2022-03-28 | End: 2022-07-07

## 2022-03-28 RX ORDER — CARVEDILOL 25 MG/1
25 TABLET ORAL 2 TIMES DAILY WITH MEALS
Qty: 60 TABLET | Refills: 0 | Status: ON HOLD | OUTPATIENT
Start: 2022-03-28 | End: 2022-04-20

## 2022-03-28 RX ORDER — CEFUROXIME AXETIL 250 MG/1
500 TABLET ORAL EVERY 12 HOURS SCHEDULED
Status: DISCONTINUED | OUTPATIENT
Start: 2022-03-28 | End: 2022-03-28 | Stop reason: HOSPADM

## 2022-03-28 RX ADMIN — GABAPENTIN 200 MG: 100 CAPSULE ORAL at 00:31

## 2022-03-28 RX ADMIN — CLONIDINE HYDROCHLORIDE 0.1 MG: 0.1 TABLET ORAL at 08:05

## 2022-03-28 RX ADMIN — FOLIC ACID 1 MG: 1 TABLET ORAL at 08:23

## 2022-03-28 RX ADMIN — THIAMINE HCL TAB 100 MG 100 MG: 100 TAB at 08:23

## 2022-03-28 RX ADMIN — CLONIDINE HYDROCHLORIDE 0.1 MG: 0.1 TABLET ORAL at 00:32

## 2022-03-28 RX ADMIN — MULTIPLE VITAMINS W/ MINERALS TAB 1 TABLET: TAB at 08:23

## 2022-03-28 RX ADMIN — FEXOFENADINE HCL 180 MG: 180 TABLET ORAL at 08:24

## 2022-03-28 RX ADMIN — POTASSIUM CHLORIDE AND SODIUM CHLORIDE: 900; 150 INJECTION, SOLUTION INTRAVENOUS at 11:51

## 2022-03-28 RX ADMIN — GABAPENTIN 200 MG: 100 CAPSULE ORAL at 08:23

## 2022-03-28 RX ADMIN — LOSARTAN POTASSIUM 100 MG: 100 TABLET, FILM COATED ORAL at 08:23

## 2022-03-28 RX ADMIN — CARVEDILOL 25 MG: 25 TABLET, FILM COATED ORAL at 08:05

## 2022-03-28 RX ADMIN — Medication 2500 MCG: at 08:05

## 2022-03-28 RX ADMIN — AZITHROMYCIN MONOHYDRATE 250 MG: 250 TABLET ORAL at 08:05

## 2022-03-28 RX ADMIN — HYDRALAZINE HYDROCHLORIDE 10 MG: 20 INJECTION INTRAMUSCULAR; INTRAVENOUS at 08:16

## 2022-03-28 RX ADMIN — POTASSIUM CHLORIDE AND SODIUM CHLORIDE: 900; 150 INJECTION, SOLUTION INTRAVENOUS at 03:49

## 2022-03-28 RX ADMIN — PANTOPRAZOLE SODIUM 40 MG: 40 TABLET, DELAYED RELEASE ORAL at 08:24

## 2022-03-28 ASSESSMENT — ACTIVITIES OF DAILY LIVING (ADL)
ADLS_ACUITY_SCORE: 5

## 2022-03-28 NOTE — PLAN OF CARE
Goal Outcome Evaluation:    AOX4. Room air. Up independetly in room. CIWA protocol, ativan prn. Mild tremors. Hypertensive SBP, blood pressure medications given. No hydralazine needed SBP<180.     Major shift events: uneventful    Treatment plan: IV fluids, Antibiotics, CIWA, pain management

## 2022-03-28 NOTE — CONSULTS
3/28/2022      Spoke with pt via telephone to offer CD services. Pt reports she would like treatment she can do after work. Pt reports she would like to receive CD resources but doesn't have her new email address written down. Email has been sent to unit SW with CD resources for pt. Pt is able to call Mental Health and Addiction Services Line: [phone] and make an appt through  [facility] if she would like an evaluation after she is discharged.    Home  [facility]-  https://www.ConnectYard        Supportive Services     SMART Recovery  https://www.smartrecovery.org    Alcoholics Anonymous  http://www.aa.org  Community Drug & Alcohol Support Resources   Alcoholics Anonymous   24/7 Phone Line: [phone]   https://[place].org/   https://a[place].org/   For additional list of online meetings: http://aa-intergroup.org        [place] [facility]   [address]   [place] [address]   Phone: [phone]    NATHAN Rogers  Phone: [phone]  Email: [email or web address] – [facility]

## 2022-03-28 NOTE — CONSULTS
Care Management Discharge Note    Discharge Date: 03/28/2022      Additional Information:    Pt has no insurance.  Financial advisors called Pt:    DOS 3/26/22: Spoke to pt regarding ins. Pt stated no ins. Screened pt for [facility]. Pt is a single adult with no children working FT. Pt [place] over [place] but should qualify for [facility]. Mailed [employer] care letter and jaylan to pt. Confirming acct self pay    CD also called Pt.  Resources given.    Pt doesn't have a primary care provider.  She is agreeable to resources.  Discharge AVS updated.    [facility]  Address: [address]  [place] [address]  Phone: [phone]    Marisela Weller RN BSN   Inpatient Care Coordination  [facility]  [phone]      Janell Weller RN

## 2022-03-28 NOTE — DISCHARGE SUMMARY
[facility]  Hospitalist Discharge Summary      Date of Admission:  3/26/2022  Date of Discharge:  3/28/2022  Discharging Provider: Devan Velasquez MD  Discharge Service: Hospitalist Service    Discharge Diagnoses   Cough  Community Acquired Pneumonia  Asthma/COPD   Acute Alcohol Intoxication  Alcohol Abuse  Alcohol Pancreatitis  Active Nicotine Dependence with Cigarettes  Hypokalemia  Elevated LFTs  Thrombocytopenia/Elevated MCV  Hypertension   Peripheral Neuropathy  Hypomagnesemia  Hypoalbuminemia      Chronic Medical Problems:  Sarcoidosis  Hx of Subdural Hematoma    Follow-ups Needed After Discharge    Follow-up Appointments     Follow-up and recommended labs and tests       Follow up with primary care provider, Physician No Ref-Primary, within 7   days for hospital follow- up.  No follow up labs or test are needed.         {Additional follow-up instructions/to-do's for PCP     Needs follow up scheduled with PCP    Unresulted Labs Ordered in the Past 30 Days of this Admission     Date and Time Order Name Status Description    3/26/2022  6:00 PM Blood Culture Wrist, Right Preliminary     3/26/2022  6:00 PM Blood Culture Hand, Right Preliminary       These results will be followed up by PCP    Discharge Disposition   Discharged to home  Condition at discharge: Stable    Hospital Course            Jose Corral is a 51 year old female with past medical history of alcohol abuse, hypertension, asthma, sarcoidosis, active nicotine dependence with cigarettes who presented on 3/26/2022 with chief complaint of cough.  The patient reported that approximately 2 weeks ago she developed a productive cough.  She is also had fatigue, headache, diarrhea, and one episode of emesis approximately 3 days prior to admission.  She also reports loss in appetite.  She denied any current abdominal pain.  On 3/21, she presented to urgent care who kendall lab work and found her to have lab abnormalities and so she  "was called and told to come to the emergency department.  She reported subjective chills over the last 2 weeks as well.  She reports that 1 L of gin will last her approximately 2 days.  Her last drink was 1 day PTA by her report     In the emergency department the patient found have a temperature of 98.6  F, heart rate 94, blood pressure 138/89, respiratory rate 17, SPO2 93% on room air.  Lab work revealed hypokalemia of 2.8, albumin 3.0, AST//62, lipase 1428, platelet 112.  The patient had a negative influenza and Covid 19 PCR.  Chest x-ray revealed mild interstitial infiltrate lateral to the right suprahilar region.  The patient was started on azithromycin and ceftriaxone with concern for developing community-acquired pneumonia.  The patient was provided a DuoNeb treatment by EMS with improvement in some mild wheezing they saw on exam.  The patient was started on CIWA protocol, IV fluids, and a clear liquid diet.     ASSESSMENT/PLAN     Cough  Community Acquired Pneumonia  Asthma/COPD  - Started Azithromycin and ceftriaxone, switch to Ceftin plus Azithromycin at discharge  - Dose not appear to have acute exacerbation of asthma/COPD  - Duonebs prn  - Pt  Rxed Albuterol inhaler at discharge  -remained afebrile, O2 sat ok     Acute Alcohol Intoxication  Alcohol Abuse  - Etoh level = 0.33 on admission  - Drinks 1 L of Gin every 2 days  - CIWA with Ativan/Zyprexa prn in place  - MV, FA, Thiamine,  Added po  B12 at her request  - IVF given  -stable throughout hospitalization  -Chemical dependency consulted, info given to patient for outpatient service, wants \"after work\"     Alcohol Pancreatitis  - Clear liquid diet on admission  - IVF  - Pain control prn, no pain present, advanced diet  -lipase elevated but no evidence of pancreatitis on imaging     Active Nicotine Dependence with Cigarettes  - Smokes 1 pck/3days  - Declined nicotine patch  - Discussed outpatient options for assistance with tobacco cessation " including chantix and outpatient programs     Hypokalemia  - IVF with KCl  - Electrolyte Replacement Protocol  - Daily BMP  -normal on am after admission and stable     Elevated LFTs  - Suspect secondary to etoh abuse pattern consistent  - IVF  - Daily Hepatic Panel, stable  -US revealed fatty liver without cirrhosis ot splenomegally     Thrombocytopenia  - Suspect secondary to etoh abuse  - Daily CBC  -stable at 118     Hypertension  -significantly elevated after discharge  - Resumed home antihypertensives presently  - Hydralazine given prn   -wasn't taking regularly as outpatient  -improved/stabalize with reestablishment of therapy     Peripheral Neuropathy  - Suspect secondary to etoh abuse  - Discussed the importance of etoh cessation  - Gabapentin 200 mg BID   -follow up further diagnostics with PCP  Hypomagnesemia  -mild, replace as well  Hypoalbuminemia   -likely secondary to poor nutrition.Ethoh as well  -advanced diet     Chronic Medical Problems:  Sarcoidosis  Hx of Subdural Hematoma         Consultations This Hospital Stay   CHEMICAL DEPENDENCY IP CONSULT    Code Status   Full Code    Time Spent on this Encounter   I, Devan Velasquez MD, personally saw the patient today and spent greater than 30 minutes discharging this patient.       Devan Velasquez MD  [place] [address] [facility]  [address] [place] [address]  Phone: [phone]  Fax: [phone]  ______________________________________________________________________    Physical Exam   Vital Signs: Temp: 97.9  F (36.6  C) Temp src: Oral BP: 126/87 Pulse: 77   Resp: 18 SpO2: 100 % O2 Device: None (Room air)    Weight: 186 lbs 4.8 oz  Constitutional: awake, alert, cooperative, no apparent distress, and appears stated age  Eyes: sclera clear  Respiratory: No increased work of breathing, good air exchange, clear to auscultation bilaterally, no crackles or wheezing  Cardiovascular: Normal apical impulse, regular rate and  rhythm, normal S1 and S2, no S3 or S4, and no murmur noted  GI: No scars, normal bowel sounds, soft, non-distended, non-tender, no masses palpated, no hepatosplenomegally  Neurologic: :has decrease sensation peripheral LE to mid calf in a stocking distribution  Ext: no C/C/E       Primary Care Physician   Physician No Ref-Primary    Discharge Orders      Follow-up and recommended labs and tests     Follow up with primary care provider, Physician No Ref-Primary, within 7 days for hospital follow- up.  No follow up labs or test are needed.     Activity    Your activity upon discharge: activity as tolerated     Reason for your hospital stay    Pneumonia     Activity    May return to work on 3/30/2022     Full Code     Diet    Follow this diet upon discharge: Orders Placed This Encounter      Regular Diet Adult       Significant Results and Procedures   Most Recent 3 CBC's:Recent Labs   Lab Test 03/28/22  0634 03/27/22  0752 03/26/22  1646   WBC 3.4* 7.6 6.4   HGB 10.8* 11.2* 12.0   * 100 101*   * 118* 112*     Most Recent 3 BMP's:Recent Labs   Lab Test 03/28/22  0634 03/27/22  0752 03/26/22  2208 03/26/22  1958 03/26/22  1646    136  --   --  137   POTASSIUM 4.2 3.7 3.0*  --  2.8*   CHLORIDE 104 101  --   --  98   CO2 31 30  --   --  30   BUN 8 7  --   --  7   CR 0.55 0.50*  --  0.49* 0.53   ANIONGAP 2* 5  --   --  9   SABINO 8.5 8.5  --   --  8.5   * 126*  --   --  89     Most Recent 2 LFT's:Recent Labs   Lab Test 03/28/22  0634 03/27/22  0752   AST 57* 128*   ALT 37 49   ALKPHOS 88 91   BILITOTAL 0.9 0.9     Most Recent 3 INR's:Recent Labs   Lab Test 03/27/22  0752 03/12/21  1134 11/05/20  0820   INR 1.05 0.93 0.95   ,   Results for orders placed or performed during the hospital encounter of 03/26/22   XR Chest 2 Views    Narrative    EXAM: XR CHEST 2 VW  LOCATION: [facility]  DATE/TIME: 3/26/2022 4:55 PM    INDICATION: cough  COMPARISON: 3/12/2021      Impression     IMPRESSION: Heart size and pulmonary vessels normal. Mild interstitial infiltrate lateral to right suprahilar region could relate to minimal pneumonitis. Lungs otherwise clear. No pleural effusion.  Consider follow-up exam in 6-8 weeks to reevaluate.   US Abdomen Complete    Narrative    EXAM: US ABDOMEN COMPLETE  LOCATION: [facility]  DATE/TIME: 3/27/2022 11:55 PM    INDICATION: Alcohol use disorder, abnl LFTs, elevated Lipase  COMPARISON: None.  TECHNIQUE: Complete abdominal ultrasound.    FINDINGS:    GALLBLADDER: Postcholecystectomy.    BILE DUCTS: No biliary dilatation. The common duct measures 5 mm.    LIVER: Echogenic parenchyma. Hepatic contour appears smooth. No focal mass.    RIGHT KIDNEY: Normal size. Normal echogenicity with no hydronephrosis or mass.     LEFT KIDNEY: Normal size. Normal echogenicity with no hydronephrosis or mass.     SPLEEN: Normal.    PANCREAS: The visualized portions are normal.    AORTA: Normal in caliber.     IVC: Normal where visualized.    No ascites.      Impression    IMPRESSION:  1.  Postcholecystectomy.  2.  Hepatic steatosis.             Discharge Medications   Current Discharge Medication List      START taking these medications    Details   albuterol (PROAIR HFA/PROVENTIL HFA/VENTOLIN HFA) 108 (90 Base) MCG/ACT inhaler Inhale 2 puffs into the lungs every 6 hours  Qty: 18 g, Refills: 0    Comments: Pharmacy may dispense brand covered by insurance (Proair, or proventil or ventolin or generic albuterol inhaler)  Associated Diagnoses: Mild intermittent asthma, unspecified whether complicated      azithromycin (ZITHROMAX) 250 MG tablet Take 1 tablet (250 mg) by mouth daily for 3 days  Qty: 3 tablet, Refills: 0    Associated Diagnoses: Pneumonia due to infectious organism, unspecified laterality, unspecified part of lung      cefuroxime (CEFTIN) 500 MG tablet Take 1 tablet (500 mg) by mouth every 12 hours for 3 days  Qty: 6 tablet, Refills: 0    Associated  Diagnoses: Pneumonia due to infectious organism, unspecified laterality, unspecified part of lung      cyanocobalamin (VITAMIN B-12) 2500 MCG SUBL sublingual tablet Place 1 tablet (2,500 mcg) under the tongue daily  Qty: 30 tablet, Refills: 0    Associated Diagnoses: Alcohol use disorder, moderate, dependence (H)      folic acid (FOLVITE) 1 MG tablet Take 1 tablet (1 mg) by mouth daily  Qty: 30 tablet, Refills: 0    Associated Diagnoses: Alcohol use disorder, moderate, dependence (H)      gabapentin (NEURONTIN) 100 MG capsule Take 2 capsules (200 mg) by mouth 2 times daily  Qty: 120 capsule, Refills: 0    Associated Diagnoses: Alcohol use disorder, moderate, dependence (H)      multivitamin w/minerals (THERA-VIT-M) tablet Take 1 tablet by mouth daily  Qty: 30 tablet, Refills: 0    Associated Diagnoses: Alcohol use disorder, moderate, dependence (H)         CONTINUE these medications which have CHANGED    Details   carvedilol (COREG) 25 MG tablet Take 1 tablet (25 mg) by mouth 2 times daily (with meals)  Qty: 60 tablet, Refills: 0    Associated Diagnoses: Hypertensive urgency      fexofenadine (ALLEGRA ALLERGY) 180 MG tablet Take 1 tablet (180 mg) by mouth daily  Qty: 30 tablet, Refills: 0    Associated Diagnoses: Mild intermittent asthma, unspecified whether complicated      losartan (COZAAR) 100 MG tablet Take 1 tablet (100 mg) by mouth daily  Qty: 30 tablet, Refills: 0    Associated Diagnoses: Hypertensive urgency           Allergies   No Known Allergies

## 2022-03-28 NOTE — PLAN OF CARE
Pt. Discharged home with friend via w/c at 1430. Discharge instructions given to pt. Along with a letter the doctor wrote for her work. All belongings sent with patient.

## 2022-03-28 NOTE — DISCHARGE INSTRUCTIONS
[facility]-Establish care when you have insurance.    Address: [address], [place] [address]  Phone: [phone]

## 2022-03-31 ENCOUNTER — NURSE TRIAGE (OUTPATIENT)
Dept: NURSING | Facility: CLINIC | Age: 52
End: 2022-03-31

## 2022-03-31 NOTE — TELEPHONE ENCOUNTER
Triage call:     States she was in the ER- 3/26/22- was there for a couple days - home on Monday March 28   Has high blood pressure- states she feels she was given the wrong medication- writer reviewed medications she was prescribed to be sent home with- patient then states she is not concerned about any of her medication.   States she was shaking and vomiting when she came home  States she went to take a shower and she is losing her hair- states she washed her hair and it is coming out in clumps    States her scalp is not painful or red at the area of hair loss  Unable to estimate the size of the patch of hair loss    Patient wants to know why her hair is falling out- writer advised that she should be seen in the clinic for a MD to evaluate her and give additional insight on why she is experiencing this hair loss. Patient does not have a PCP but is agreeable to seeing someone.     Per protocol- patient should be seen within the next 2 weeks, Reviewed additional care advice and patient verbalizes understanding. Assisted in transferring to scheduling.     Selin Lopez RN BSN 3/31/2022 12:52 PM           Reason for Disposition    [1] Patch of hair loss AND [2] cause not known    Additional Information    Negative: Head lice suspected (e.g., head itching and lice or nits are seen)    Negative: Patient sounds very sick or weak to the triager    Negative: [1] Scalp is red and painful in area of hair loss AND [2] large (more than 1 inch; 2.5 cm)    Negative: [1] Scalp is red and painful in area of hair loss AND [2] small (less than 1 inch; 2.5 cm)    Negative: Scabs or crusts are present in the hair    Negative: Ringworm of the scalp suspected (round patch of hair loss with scales, rough surface, redness or itching)    Negative: [1] Recently diagnosed with ringworm AND [2] gets WORSE on treatment (e.g., gets larger, more spots)    Negative: Chemotherapy and questions about hair loss    Negative: Hair loss from nervous  habit of twisting (or pulling) the hair    Protocols used: HAIR LOSS-A-AH

## 2022-04-01 LAB
BACTERIA BLD CULT: NO GROWTH
BACTERIA BLD CULT: NO GROWTH

## 2022-04-16 ENCOUNTER — HOSPITAL ENCOUNTER (INPATIENT)
Facility: CLINIC | Age: 52
LOS: 3 days | Discharge: HOME | End: 2022-04-20
Attending: EMERGENCY MEDICINE | Admitting: STUDENT IN AN ORGANIZED HEALTH CARE EDUCATION/TRAINING PROGRAM
Payer: MEDICAID

## 2022-04-16 ENCOUNTER — APPOINTMENT (OUTPATIENT)
Dept: GENERAL RADIOLOGY | Facility: CLINIC | Age: 52
End: 2022-04-16
Attending: EMERGENCY MEDICINE
Payer: MEDICAID

## 2022-04-16 DIAGNOSIS — E87.29 HIGH ANION GAP METABOLIC ACIDOSIS: ICD-10-CM

## 2022-04-16 DIAGNOSIS — F10.920 ALCOHOLIC INTOXICATION WITHOUT COMPLICATION (H): ICD-10-CM

## 2022-04-16 DIAGNOSIS — R94.31 ABNORMAL ELECTROCARDIOGRAM: ICD-10-CM

## 2022-04-16 DIAGNOSIS — E83.42 HYPOMAGNESEMIA: ICD-10-CM

## 2022-04-16 DIAGNOSIS — E87.6 HYPOKALEMIA: ICD-10-CM

## 2022-04-16 DIAGNOSIS — R06.00 DYSPNEA, UNSPECIFIED TYPE: ICD-10-CM

## 2022-04-16 DIAGNOSIS — F32.A DEPRESSION, UNSPECIFIED DEPRESSION TYPE: Primary | ICD-10-CM

## 2022-04-16 DIAGNOSIS — E16.2 HYPOGLYCEMIA: ICD-10-CM

## 2022-04-16 DIAGNOSIS — R45.851 SUICIDAL IDEATION: ICD-10-CM

## 2022-04-16 DIAGNOSIS — I16.0 HYPERTENSIVE URGENCY: ICD-10-CM

## 2022-04-16 PROCEDURE — 99285 EMERGENCY DEPT VISIT HI MDM: CPT | Mod: 25

## 2022-04-16 PROCEDURE — 71045 X-RAY EXAM CHEST 1 VIEW: CPT

## 2022-04-16 PROCEDURE — 93005 ELECTROCARDIOGRAM TRACING: CPT

## 2022-04-16 PROCEDURE — HZ2ZZZZ DETOXIFICATION SERVICES FOR SUBSTANCE ABUSE TREATMENT: ICD-10-PCS | Performed by: STUDENT IN AN ORGANIZED HEALTH CARE EDUCATION/TRAINING PROGRAM

## 2022-04-16 RX ORDER — LORAZEPAM 0.5 MG/1
0.5 TABLET ORAL ONCE
Status: COMPLETED | OUTPATIENT
Start: 2022-04-16 | End: 2022-04-17

## 2022-04-16 RX ORDER — METHYLPREDNISOLONE SODIUM SUCCINATE 125 MG/2ML
125 INJECTION, POWDER, LYOPHILIZED, FOR SOLUTION INTRAMUSCULAR; INTRAVENOUS ONCE
Status: COMPLETED | OUTPATIENT
Start: 2022-04-16 | End: 2022-04-17

## 2022-04-16 RX ORDER — ALBUTEROL SULFATE 0.83 MG/ML
2.5 SOLUTION RESPIRATORY (INHALATION) ONCE
Status: COMPLETED | OUTPATIENT
Start: 2022-04-16 | End: 2022-04-17

## 2022-04-17 PROBLEM — R06.00 DYSPNEA, UNSPECIFIED TYPE: Status: ACTIVE | Noted: 2022-04-17

## 2022-04-17 PROBLEM — F10.920 ALCOHOLIC INTOXICATION WITHOUT COMPLICATION (H): Status: ACTIVE | Noted: 2022-04-17

## 2022-04-17 PROBLEM — R94.31 ABNORMAL ELECTROCARDIOGRAM: Status: ACTIVE | Noted: 2022-04-17

## 2022-04-17 LAB
ALBUMIN SERPL-MCNC: 3.5 G/DL (ref 3.4–5)
ALBUMIN UR-MCNC: >600 MG/DL
ALP SERPL-CCNC: 101 U/L (ref 40–150)
ALT SERPL W P-5'-P-CCNC: 45 U/L (ref 0–50)
ANION GAP SERPL CALCULATED.3IONS-SCNC: 23 MMOL/L (ref 3–14)
APPEARANCE UR: ABNORMAL
AST SERPL W P-5'-P-CCNC: 167 U/L (ref 0–45)
BASOPHILS # BLD AUTO: 0 10E3/UL (ref 0–0.2)
BASOPHILS NFR BLD AUTO: 0 %
BILIRUB SERPL-MCNC: 0.6 MG/DL (ref 0.2–1.3)
BILIRUB UR QL STRIP: ABNORMAL
BUN SERPL-MCNC: 10 MG/DL (ref 7–30)
CALCIUM SERPL-MCNC: 8.5 MG/DL (ref 8.5–10.1)
CHLORIDE BLD-SCNC: 94 MMOL/L (ref 94–109)
CO2 SERPL-SCNC: 23 MMOL/L (ref 20–32)
COLOR UR AUTO: ABNORMAL
CREAT SERPL-MCNC: 0.45 MG/DL (ref 0.52–1.04)
EOSINOPHIL # BLD AUTO: 0 10E3/UL (ref 0–0.7)
EOSINOPHIL NFR BLD AUTO: 0 %
ERYTHROCYTE [DISTWIDTH] IN BLOOD BY AUTOMATED COUNT: 15.9 % (ref 10–15)
ETHANOL SERPL-MCNC: 0.29 G/DL
ETHANOL SERPL-MCNC: 0.32 G/DL
FLUAV RNA SPEC QL NAA+PROBE: NEGATIVE
FLUBV RNA RESP QL NAA+PROBE: NEGATIVE
GFR SERPL CREATININE-BSD FRML MDRD: >90 ML/MIN/1.73M2
GLUCOSE BLD-MCNC: 65 MG/DL (ref 70–99)
GLUCOSE BLDC GLUCOMTR-MCNC: 117 MG/DL (ref 70–99)
GLUCOSE BLDC GLUCOMTR-MCNC: 158 MG/DL (ref 70–99)
GLUCOSE BLDC GLUCOMTR-MCNC: 163 MG/DL (ref 70–99)
GLUCOSE BLDC GLUCOMTR-MCNC: 215 MG/DL (ref 70–99)
GLUCOSE BLDC GLUCOMTR-MCNC: 261 MG/DL (ref 70–99)
GLUCOSE BLDC GLUCOMTR-MCNC: 289 MG/DL (ref 70–99)
GLUCOSE BLDC GLUCOMTR-MCNC: 92 MG/DL (ref 70–99)
GLUCOSE UR STRIP-MCNC: NEGATIVE MG/DL
HCT VFR BLD AUTO: 28.2 % (ref 35–47)
HGB BLD-MCNC: 10.5 G/DL (ref 11.7–15.7)
HGB UR QL STRIP: ABNORMAL
IMM GRANULOCYTES # BLD: 0 10E3/UL
IMM GRANULOCYTES NFR BLD: 0 %
KETONES BLD-SCNC: 6.7 MMOL/L (ref 0–0.6)
KETONES UR STRIP-MCNC: >150 MG/DL
LACTATE SERPL-SCNC: 2.1 MMOL/L (ref 0.7–2)
LEUKOCYTE ESTERASE UR QL STRIP: NEGATIVE
LIPASE SERPL-CCNC: 2899 U/L (ref 73–393)
LYMPHOCYTES # BLD AUTO: 1 10E3/UL (ref 0.8–5.3)
LYMPHOCYTES NFR BLD AUTO: 13 %
MAGNESIUM SERPL-MCNC: 1.2 MG/DL (ref 1.6–2.3)
MAGNESIUM SERPL-MCNC: 1.2 MG/DL (ref 1.6–2.3)
MAGNESIUM SERPL-MCNC: 1.8 MG/DL (ref 1.6–2.3)
MCH RBC QN AUTO: 37 PG (ref 26.5–33)
MCHC RBC AUTO-ENTMCNC: 37.2 G/DL (ref 31.5–36.5)
MCV RBC AUTO: 99 FL (ref 78–100)
MONOCYTES # BLD AUTO: 0.8 10E3/UL (ref 0–1.3)
MONOCYTES NFR BLD AUTO: 10 %
NEUTROPHILS # BLD AUTO: 5.6 10E3/UL (ref 1.6–8.3)
NEUTROPHILS NFR BLD AUTO: 77 %
NITRATE UR QL: NEGATIVE
NRBC # BLD AUTO: 0 10E3/UL
NRBC BLD AUTO-RTO: 0 /100
PH UR STRIP: 6 [PH] (ref 5–7)
PHOSPHATE SERPL-MCNC: 4.5 MG/DL (ref 2.5–4.5)
PLATELET # BLD AUTO: 206 10E3/UL (ref 150–450)
POTASSIUM BLD-SCNC: 2.7 MMOL/L (ref 3.4–5.3)
POTASSIUM BLD-SCNC: 2.9 MMOL/L (ref 3.4–5.3)
POTASSIUM BLD-SCNC: 3.1 MMOL/L (ref 3.4–5.3)
POTASSIUM BLD-SCNC: 4 MMOL/L (ref 3.4–5.3)
PROCALCITONIN SERPL-MCNC: 0.14 NG/ML
PROT SERPL-MCNC: 7.8 G/DL (ref 6.8–8.8)
RBC # BLD AUTO: 2.84 10E6/UL (ref 3.8–5.2)
RBC URINE: 15 /HPF
RSV RNA SPEC NAA+PROBE: NEGATIVE
SARS-COV-2 RNA RESP QL NAA+PROBE: NEGATIVE
SODIUM SERPL-SCNC: 140 MMOL/L (ref 133–144)
SP GR UR STRIP: 1.03 (ref 1–1.03)
SQUAMOUS EPITHELIAL: 22 /HPF
TROPONIN I SERPL HS-MCNC: 16 NG/L
UROBILINOGEN UR STRIP-MCNC: 8 MG/DL
WBC # BLD AUTO: 7.3 10E3/UL (ref 4–11)
WBC URINE: 8 /HPF

## 2022-04-17 PROCEDURE — 120N000004 HC R&B MS OVERFLOW

## 2022-04-17 PROCEDURE — 85025 COMPLETE CBC W/AUTO DIFF WBC: CPT | Performed by: EMERGENCY MEDICINE

## 2022-04-17 PROCEDURE — 96375 TX/PRO/DX INJ NEW DRUG ADDON: CPT

## 2022-04-17 PROCEDURE — 36415 COLL VENOUS BLD VENIPUNCTURE: CPT | Performed by: EMERGENCY MEDICINE

## 2022-04-17 PROCEDURE — 83735 ASSAY OF MAGNESIUM: CPT | Performed by: EMERGENCY MEDICINE

## 2022-04-17 PROCEDURE — 36415 COLL VENOUS BLD VENIPUNCTURE: CPT | Performed by: HOSPITALIST

## 2022-04-17 PROCEDURE — 250N000013 HC RX MED GY IP 250 OP 250 PS 637: Performed by: HOSPITALIST

## 2022-04-17 PROCEDURE — G0378 HOSPITAL OBSERVATION PER HR: HCPCS

## 2022-04-17 PROCEDURE — 250N000013 HC RX MED GY IP 250 OP 250 PS 637: Performed by: EMERGENCY MEDICINE

## 2022-04-17 PROCEDURE — 82077 ASSAY SPEC XCP UR&BREATH IA: CPT | Performed by: EMERGENCY MEDICINE

## 2022-04-17 PROCEDURE — 82962 GLUCOSE BLOOD TEST: CPT

## 2022-04-17 PROCEDURE — 99223 1ST HOSP IP/OBS HIGH 75: CPT | Mod: AI | Performed by: STUDENT IN AN ORGANIZED HEALTH CARE EDUCATION/TRAINING PROGRAM

## 2022-04-17 PROCEDURE — 87637 SARSCOV2&INF A&B&RSV AMP PRB: CPT | Performed by: EMERGENCY MEDICINE

## 2022-04-17 PROCEDURE — 250N000013 HC RX MED GY IP 250 OP 250 PS 637: Performed by: STUDENT IN AN ORGANIZED HEALTH CARE EDUCATION/TRAINING PROGRAM

## 2022-04-17 PROCEDURE — 250N000012 HC RX MED GY IP 250 OP 636 PS 637: Performed by: STUDENT IN AN ORGANIZED HEALTH CARE EDUCATION/TRAINING PROGRAM

## 2022-04-17 PROCEDURE — 84484 ASSAY OF TROPONIN QUANT: CPT | Performed by: EMERGENCY MEDICINE

## 2022-04-17 PROCEDURE — 258N000003 HC RX IP 258 OP 636: Performed by: EMERGENCY MEDICINE

## 2022-04-17 PROCEDURE — 36415 COLL VENOUS BLD VENIPUNCTURE: CPT | Performed by: STUDENT IN AN ORGANIZED HEALTH CARE EDUCATION/TRAINING PROGRAM

## 2022-04-17 PROCEDURE — 81001 URINALYSIS AUTO W/SCOPE: CPT | Performed by: STUDENT IN AN ORGANIZED HEALTH CARE EDUCATION/TRAINING PROGRAM

## 2022-04-17 PROCEDURE — 84145 PROCALCITONIN (PCT): CPT | Performed by: EMERGENCY MEDICINE

## 2022-04-17 PROCEDURE — 83605 ASSAY OF LACTIC ACID: CPT | Performed by: STUDENT IN AN ORGANIZED HEALTH CARE EDUCATION/TRAINING PROGRAM

## 2022-04-17 PROCEDURE — 84132 ASSAY OF SERUM POTASSIUM: CPT | Performed by: EMERGENCY MEDICINE

## 2022-04-17 PROCEDURE — 93010 ELECTROCARDIOGRAM REPORT: CPT | Performed by: INTERNAL MEDICINE

## 2022-04-17 PROCEDURE — 250N000011 HC RX IP 250 OP 636: Performed by: EMERGENCY MEDICINE

## 2022-04-17 PROCEDURE — C9803 HOPD COVID-19 SPEC COLLECT: HCPCS

## 2022-04-17 PROCEDURE — 258N000003 HC RX IP 258 OP 636: Performed by: STUDENT IN AN ORGANIZED HEALTH CARE EDUCATION/TRAINING PROGRAM

## 2022-04-17 PROCEDURE — 83735 ASSAY OF MAGNESIUM: CPT | Performed by: STUDENT IN AN ORGANIZED HEALTH CARE EDUCATION/TRAINING PROGRAM

## 2022-04-17 PROCEDURE — 99207 PR APP CREDIT; MD BILLING SHARED VISIT: CPT | Performed by: HOSPITALIST

## 2022-04-17 PROCEDURE — 80053 COMPREHEN METABOLIC PANEL: CPT | Performed by: EMERGENCY MEDICINE

## 2022-04-17 PROCEDURE — 83690 ASSAY OF LIPASE: CPT | Performed by: EMERGENCY MEDICINE

## 2022-04-17 PROCEDURE — 87040 BLOOD CULTURE FOR BACTERIA: CPT | Performed by: EMERGENCY MEDICINE

## 2022-04-17 PROCEDURE — 87040 BLOOD CULTURE FOR BACTERIA: CPT | Performed by: STUDENT IN AN ORGANIZED HEALTH CARE EDUCATION/TRAINING PROGRAM

## 2022-04-17 PROCEDURE — 96374 THER/PROPH/DIAG INJ IV PUSH: CPT

## 2022-04-17 PROCEDURE — 94640 AIRWAY INHALATION TREATMENT: CPT

## 2022-04-17 PROCEDURE — 250N000013 HC RX MED GY IP 250 OP 250 PS 637: Performed by: PSYCHIATRY & NEUROLOGY

## 2022-04-17 PROCEDURE — 82010 KETONE BODYS QUAN: CPT | Performed by: STUDENT IN AN ORGANIZED HEALTH CARE EDUCATION/TRAINING PROGRAM

## 2022-04-17 PROCEDURE — 250N000009 HC RX 250: Performed by: EMERGENCY MEDICINE

## 2022-04-17 PROCEDURE — 250N000011 HC RX IP 250 OP 636: Performed by: STUDENT IN AN ORGANIZED HEALTH CARE EDUCATION/TRAINING PROGRAM

## 2022-04-17 PROCEDURE — 84100 ASSAY OF PHOSPHORUS: CPT | Performed by: STUDENT IN AN ORGANIZED HEALTH CARE EDUCATION/TRAINING PROGRAM

## 2022-04-17 PROCEDURE — 84132 ASSAY OF SERUM POTASSIUM: CPT | Performed by: STUDENT IN AN ORGANIZED HEALTH CARE EDUCATION/TRAINING PROGRAM

## 2022-04-17 PROCEDURE — 84132 ASSAY OF SERUM POTASSIUM: CPT | Performed by: HOSPITALIST

## 2022-04-17 RX ORDER — POTASSIUM CHLORIDE 1500 MG/1
40 TABLET, EXTENDED RELEASE ORAL ONCE
Status: COMPLETED | OUTPATIENT
Start: 2022-04-17 | End: 2022-04-17

## 2022-04-17 RX ORDER — POLYETHYLENE GLYCOL 3350 17 G/17G
17 POWDER, FOR SOLUTION ORAL DAILY
Status: DISCONTINUED | OUTPATIENT
Start: 2022-04-17 | End: 2022-04-20 | Stop reason: HOSPADM

## 2022-04-17 RX ORDER — GABAPENTIN 600 MG/1
1200 TABLET ORAL ONCE
Status: COMPLETED | OUTPATIENT
Start: 2022-04-17 | End: 2022-04-17

## 2022-04-17 RX ORDER — GABAPENTIN 300 MG/1
600 CAPSULE ORAL EVERY 8 HOURS
Status: DISCONTINUED | OUTPATIENT
Start: 2022-04-20 | End: 2022-04-20 | Stop reason: HOSPADM

## 2022-04-17 RX ORDER — POTASSIUM CHLORIDE 7.45 MG/ML
10 INJECTION INTRAVENOUS ONCE
Status: COMPLETED | OUTPATIENT
Start: 2022-04-17 | End: 2022-04-17

## 2022-04-17 RX ORDER — HEPARIN SODIUM 5000 [USP'U]/.5ML
5000 INJECTION, SOLUTION INTRAVENOUS; SUBCUTANEOUS EVERY 12 HOURS
Status: DISCONTINUED | OUTPATIENT
Start: 2022-04-17 | End: 2022-04-20 | Stop reason: HOSPADM

## 2022-04-17 RX ORDER — DEXTROSE, SODIUM CHLORIDE, SODIUM LACTATE, POTASSIUM CHLORIDE, AND CALCIUM CHLORIDE 5; .6; .31; .03; .02 G/100ML; G/100ML; G/100ML; G/100ML; G/100ML
INJECTION, SOLUTION INTRAVENOUS CONTINUOUS
Status: DISCONTINUED | OUTPATIENT
Start: 2022-04-17 | End: 2022-04-19

## 2022-04-17 RX ORDER — NICOTINE POLACRILEX 4 MG
15-30 LOZENGE BUCCAL
Status: DISCONTINUED | OUTPATIENT
Start: 2022-04-17 | End: 2022-04-20 | Stop reason: HOSPADM

## 2022-04-17 RX ORDER — SODIUM CHLORIDE, SODIUM LACTATE, POTASSIUM CHLORIDE, CALCIUM CHLORIDE 600; 310; 30; 20 MG/100ML; MG/100ML; MG/100ML; MG/100ML
INJECTION, SOLUTION INTRAVENOUS CONTINUOUS
Status: DISCONTINUED | OUTPATIENT
Start: 2022-04-17 | End: 2022-04-19

## 2022-04-17 RX ORDER — LORAZEPAM 1 MG/1
1-2 TABLET ORAL EVERY 30 MIN PRN
Status: DISCONTINUED | OUTPATIENT
Start: 2022-04-17 | End: 2022-04-20 | Stop reason: HOSPADM

## 2022-04-17 RX ORDER — MAGNESIUM OXIDE 400 MG/1
400 TABLET ORAL 2 TIMES DAILY
Status: COMPLETED | OUTPATIENT
Start: 2022-04-17 | End: 2022-04-18

## 2022-04-17 RX ORDER — OLANZAPINE 5 MG/1
5-10 TABLET, ORALLY DISINTEGRATING ORAL EVERY 6 HOURS PRN
Status: CANCELLED | OUTPATIENT
Start: 2022-04-17

## 2022-04-17 RX ORDER — MAGNESIUM SULFATE HEPTAHYDRATE 40 MG/ML
2 INJECTION, SOLUTION INTRAVENOUS ONCE
Status: COMPLETED | OUTPATIENT
Start: 2022-04-17 | End: 2022-04-17

## 2022-04-17 RX ORDER — ONDANSETRON 4 MG/1
4 TABLET, ORALLY DISINTEGRATING ORAL EVERY 6 HOURS PRN
Status: CANCELLED | OUTPATIENT
Start: 2022-04-17

## 2022-04-17 RX ORDER — LIDOCAINE 40 MG/G
CREAM TOPICAL
Status: DISCONTINUED | OUTPATIENT
Start: 2022-04-17 | End: 2022-04-20 | Stop reason: HOSPADM

## 2022-04-17 RX ORDER — ACETAMINOPHEN 325 MG/1
975 TABLET ORAL EVERY 8 HOURS
Status: DISCONTINUED | OUTPATIENT
Start: 2022-04-17 | End: 2022-04-19

## 2022-04-17 RX ORDER — GABAPENTIN 100 MG/1
100 CAPSULE ORAL EVERY 8 HOURS
Status: DISCONTINUED | OUTPATIENT
Start: 2022-04-24 | End: 2022-04-20 | Stop reason: HOSPADM

## 2022-04-17 RX ORDER — HALOPERIDOL 5 MG/ML
2.5-5 INJECTION INTRAMUSCULAR EVERY 6 HOURS PRN
Status: CANCELLED | OUTPATIENT
Start: 2022-04-17

## 2022-04-17 RX ORDER — ESCITALOPRAM OXALATE 5 MG/1
5 TABLET ORAL DAILY
Status: DISCONTINUED | OUTPATIENT
Start: 2022-04-17 | End: 2022-04-20 | Stop reason: HOSPADM

## 2022-04-17 RX ORDER — FOLIC ACID 5 MG/ML
1 INJECTION, SOLUTION INTRAMUSCULAR; INTRAVENOUS; SUBCUTANEOUS DAILY
Status: DISCONTINUED | OUTPATIENT
Start: 2022-04-17 | End: 2022-04-19

## 2022-04-17 RX ORDER — CLONIDINE HYDROCHLORIDE 0.1 MG/1
0.1 TABLET ORAL EVERY 8 HOURS
Status: DISCONTINUED | OUTPATIENT
Start: 2022-04-17 | End: 2022-04-19

## 2022-04-17 RX ORDER — GABAPENTIN 300 MG/1
300 CAPSULE ORAL EVERY 8 HOURS
Status: DISCONTINUED | OUTPATIENT
Start: 2022-04-22 | End: 2022-04-20 | Stop reason: HOSPADM

## 2022-04-17 RX ORDER — ONDANSETRON 2 MG/ML
4 INJECTION INTRAMUSCULAR; INTRAVENOUS EVERY 6 HOURS PRN
Status: CANCELLED | OUTPATIENT
Start: 2022-04-17

## 2022-04-17 RX ORDER — LORAZEPAM 2 MG/ML
1-2 INJECTION INTRAMUSCULAR EVERY 30 MIN PRN
Status: DISCONTINUED | OUTPATIENT
Start: 2022-04-17 | End: 2022-04-20 | Stop reason: HOSPADM

## 2022-04-17 RX ORDER — GABAPENTIN 300 MG/1
900 CAPSULE ORAL EVERY 8 HOURS
Status: DISPENSED | OUTPATIENT
Start: 2022-04-17 | End: 2022-04-20

## 2022-04-17 RX ORDER — DEXTROSE MONOHYDRATE 25 G/50ML
25-50 INJECTION, SOLUTION INTRAVENOUS
Status: DISCONTINUED | OUTPATIENT
Start: 2022-04-17 | End: 2022-04-20 | Stop reason: HOSPADM

## 2022-04-17 RX ORDER — FLUMAZENIL 0.1 MG/ML
0.2 INJECTION, SOLUTION INTRAVENOUS
Status: DISCONTINUED | OUTPATIENT
Start: 2022-04-17 | End: 2022-04-20 | Stop reason: HOSPADM

## 2022-04-17 RX ADMIN — CLONIDINE HYDROCHLORIDE 0.1 MG: 0.1 TABLET ORAL at 13:12

## 2022-04-17 RX ADMIN — SODIUM CHLORIDE, POTASSIUM CHLORIDE, SODIUM LACTATE AND CALCIUM CHLORIDE: 600; 310; 30; 20 INJECTION, SOLUTION INTRAVENOUS at 21:05

## 2022-04-17 RX ADMIN — CLONIDINE HYDROCHLORIDE 0.1 MG: 0.1 TABLET ORAL at 21:03

## 2022-04-17 RX ADMIN — GABAPENTIN 900 MG: 300 CAPSULE ORAL at 10:13

## 2022-04-17 RX ADMIN — THIAMINE HYDROCHLORIDE 500 MG: 100 INJECTION, SOLUTION INTRAMUSCULAR; INTRAVENOUS at 15:50

## 2022-04-17 RX ADMIN — ACETAMINOPHEN 975 MG: 325 TABLET, FILM COATED ORAL at 05:50

## 2022-04-17 RX ADMIN — METHYLPREDNISOLONE SODIUM SUCCINATE 125 MG: 125 INJECTION, POWDER, FOR SOLUTION INTRAMUSCULAR; INTRAVENOUS at 00:09

## 2022-04-17 RX ADMIN — Medication 400 MG: at 21:03

## 2022-04-17 RX ADMIN — INSULIN ASPART 2 UNITS: 100 INJECTION, SOLUTION INTRAVENOUS; SUBCUTANEOUS at 22:22

## 2022-04-17 RX ADMIN — FOLIC ACID 1 MG: 5 INJECTION, SOLUTION INTRAMUSCULAR; INTRAVENOUS; SUBCUTANEOUS at 08:41

## 2022-04-17 RX ADMIN — HEPARIN SODIUM 5000 UNITS: 5000 INJECTION INTRAVENOUS; SUBCUTANEOUS at 21:03

## 2022-04-17 RX ADMIN — POTASSIUM CHLORIDE 40 MEQ: 1500 TABLET, EXTENDED RELEASE ORAL at 08:27

## 2022-04-17 RX ADMIN — POTASSIUM CHLORIDE 40 MEQ: 1500 TABLET, EXTENDED RELEASE ORAL at 01:34

## 2022-04-17 RX ADMIN — ACETAMINOPHEN 975 MG: 325 TABLET, FILM COATED ORAL at 13:12

## 2022-04-17 RX ADMIN — FOLIC ACID: 5 INJECTION, SOLUTION INTRAMUSCULAR; INTRAVENOUS; SUBCUTANEOUS at 03:03

## 2022-04-17 RX ADMIN — ALBUTEROL SULFATE 2.5 MG: 2.5 SOLUTION RESPIRATORY (INHALATION) at 00:03

## 2022-04-17 RX ADMIN — GABAPENTIN 900 MG: 300 CAPSULE ORAL at 19:04

## 2022-04-17 RX ADMIN — MAGNESIUM SULFATE HEPTAHYDRATE 2 G: 40 INJECTION, SOLUTION INTRAVENOUS at 01:34

## 2022-04-17 RX ADMIN — LORAZEPAM 0.5 MG: 0.5 TABLET ORAL at 00:05

## 2022-04-17 RX ADMIN — CLONIDINE HYDROCHLORIDE 0.1 MG: 0.1 TABLET ORAL at 05:51

## 2022-04-17 RX ADMIN — ESCITALOPRAM 5 MG: 5 TABLET, FILM COATED ORAL at 10:12

## 2022-04-17 RX ADMIN — POTASSIUM CHLORIDE 10 MEQ: 7.46 INJECTION, SOLUTION INTRAVENOUS at 02:07

## 2022-04-17 RX ADMIN — SODIUM CHLORIDE, SODIUM LACTATE, POTASSIUM CHLORIDE, CALCIUM CHLORIDE AND DEXTROSE MONOHYDRATE: 5; 600; 310; 30; 20 INJECTION, SOLUTION INTRAVENOUS at 04:45

## 2022-04-17 RX ADMIN — Medication 400 MG: at 08:27

## 2022-04-17 RX ADMIN — HEPARIN SODIUM 5000 UNITS: 5000 INJECTION INTRAVENOUS; SUBCUTANEOUS at 08:27

## 2022-04-17 RX ADMIN — SODIUM CHLORIDE, POTASSIUM CHLORIDE, SODIUM LACTATE AND CALCIUM CHLORIDE: 600; 310; 30; 20 INJECTION, SOLUTION INTRAVENOUS at 12:21

## 2022-04-17 RX ADMIN — THIAMINE HYDROCHLORIDE 500 MG: 100 INJECTION, SOLUTION INTRAMUSCULAR; INTRAVENOUS at 08:27

## 2022-04-17 RX ADMIN — GABAPENTIN 1200 MG: 600 TABLET, FILM COATED ORAL at 05:50

## 2022-04-17 RX ADMIN — THIAMINE HYDROCHLORIDE 500 MG: 100 INJECTION, SOLUTION INTRAMUSCULAR; INTRAVENOUS at 21:16

## 2022-04-17 RX ADMIN — ACETAMINOPHEN 975 MG: 325 TABLET, FILM COATED ORAL at 21:02

## 2022-04-17 ASSESSMENT — ACTIVITIES OF DAILY LIVING (ADL)
ADLS_ACUITY_SCORE: 18
ADLS_ACUITY_SCORE: 14
ADLS_ACUITY_SCORE: 15
ADLS_ACUITY_SCORE: 18
ADLS_ACUITY_SCORE: 20
ADLS_ACUITY_SCORE: 20
ADLS_ACUITY_SCORE: 18
ADLS_ACUITY_SCORE: 15
ADLS_ACUITY_SCORE: 18
ADLS_ACUITY_SCORE: 14
ADLS_ACUITY_SCORE: 15
ADLS_ACUITY_SCORE: 18
ADLS_ACUITY_SCORE: 18

## 2022-04-17 ASSESSMENT — ENCOUNTER SYMPTOMS
NAUSEA: 1
NUMBNESS: 1
CHILLS: 1
COUGH: 1
DIAPHORESIS: 1
LIGHT-HEADEDNESS: 1
VOMITING: 1
SHORTNESS OF BREATH: 1

## 2022-04-17 NOTE — ED NOTES
"Patient making comments \"I don't want to be here, this is just another hospital bill\" Pt pure wick in place, pt unsteady on feet.   "

## 2022-04-17 NOTE — PROVIDER NOTIFICATION
MD Notification    Notified Person: MD    Notified Person Name: Dr. Chaudhary    Notification Date/Time: 4/17/2022 1231    Notification Interaction: Vocera page    Purpose of Notification: Do you want to continue with sitter? Denying SI or harm to others. Also, patient with loose, black/green stool x 1 this afternoon.    Orders Received: Sitter discontinued by MD. Occult blood ordered by MD and needs to be collected.    Comments:

## 2022-04-17 NOTE — CONSULTS
See dictation. #[id number]  Psychiatry   Initial Consultation  Patient seen, see notes, call prn.  Lana Pappas MD  4/17/2022

## 2022-04-17 NOTE — PROVIDER NOTIFICATION
MD Notification    Notified Person: MD    Notified Person Name: Dr. Chaudhary    Notification Date/Time: 4/17/2022 1695    Notification Interaction: Vocera page    Purpose of Notification: EKG results are available for viewing. Also, lab calls to state they were unable to result venous blood gases that were collected at 0200 because it was collected with wrong tube. Do you want this lab repeated now?    Orders Received: Evening RN updated with the above.    Comments:

## 2022-04-17 NOTE — PROVIDER NOTIFICATION
MD Notification    Notified Person: MD    Notified Person Name: Dr. Chaudhary    Notification Date/Time: 4/17/2022 5583    Notification Interaction: Vocera page    Purpose of Notification: Patient c/o dizziness when awake. Please advise. Resting in bed currently.    Orders Received: EKG ordered by MD.    Comments:

## 2022-04-17 NOTE — PHARMACY-ADMISSION MEDICATION HISTORY
Admission medication history interview status for this patient is complete. See [facility] admission navigator for allergy information, prior to admission medications and immunization status.     Medication history interview done, indicate source(s): Patient  Medication history resources (including written lists, pill bottles, clinic record):None    Changes made to PTA medication list:  Added: none  Changed: none  Reported as Not Taking: albuterol, carvedilol, cyanocobalamin, fexofenadine, folic acid, gabapentin, losartan, multivitamin.   Removed: none    Actions taken by pharmacist (provider contacted, etc):None     Additional medication history information:None    Medication reconciliation/reorder completed by provider prior to medication history?  N   (Y/N)     Prior to Admission medications    Medication Sig Last Dose Taking? Auth Provider   albuterol (PROAIR HFA/PROVENTIL HFA/VENTOLIN HFA) 108 (90 Base) MCG/ACT inhaler Inhale 2 puffs into the lungs every 6 hours  Patient not taking: Reported on 4/17/2022 Not Taking at Unknown time  Devan Velasquez MD   carvedilol (COREG) 25 MG tablet Take 1 tablet (25 mg) by mouth 2 times daily (with meals)  Patient not taking: Reported on 4/17/2022 Not Taking at Unknown time  Devan Velasquez MD   cyanocobalamin (VITAMIN B-12) 2500 MCG SUBL sublingual tablet Place 1 tablet (2,500 mcg) under the tongue daily  Patient not taking: Reported on 4/17/2022 Not Taking at Unknown time  Devan Velasquez MD   fexofenadine (ALLEGRA ALLERGY) 180 MG tablet Take 1 tablet (180 mg) by mouth daily  Patient not taking: Reported on 4/17/2022 Not Taking at Unknown time  Devan Velasquez MD   folic acid (FOLVITE) 1 MG tablet Take 1 tablet (1 mg) by mouth daily  Patient not taking: Reported on 4/17/2022 Not Taking at Unknown time  Devan Velasquez MD   gabapentin (NEURONTIN) 100 MG capsule Take 2 capsules (200 mg) by mouth 2 times daily  Patient not taking: Reported on 4/17/2022 Not Taking at Unknown time   Devan Velasquez MD   losartan (COZAAR) 100 MG tablet Take 1 tablet (100 mg) by mouth daily  Patient not taking: Reported on 4/17/2022 Not Taking at Unknown time  Devan Velasquez MD   multivitamin w/minerals (THERA-VIT-M) tablet Take 1 tablet by mouth daily  Patient not taking: Reported on 4/17/2022 Not Taking at Unknown time  Devan Velasquez MD

## 2022-04-17 NOTE — CONSULTS
"Consult Date: 04/17/2022    DENTIFICATION:  The patient is a 51-year-old single -American female seen for psychiatric evaluation at the request Dr. Vaz after the patient was admitted with acute alcohol intoxication and passive suicidal statements.    HISTORY OF PRESENT ILLNESS:  The patient had been seen twice in the ED since early March for acute alcohol intoxication.  On 03/12, she had a 0.41.  She presented to Emergency Department with a chief complaint of shortness of breath.  Had been treated for community-acquired pneumonia with Zithromax.  She states she did not come to the ED because of alcohol use, but because of problems with her feet numbness and tingling.  She appeared not to have insight into having diabetes or the effects of alcohol, which I discussed strongly with her.  She did lose her job, unclear if she had been hung over, but denies drinking on the job.  She admits to drinking daily, however.  This she cites is due to upset with her job loss as a  \"I was still able to interact with the kids.\" She is agreeable to consider treatment at this point, although motivation for full sobriety unclear. She was also agreeable to try antidepressants use as she does describe some ongoing loneliness and depression over the past year.  Her brother Emre is her only support.    PAST PSYCHIATRIC HISTORY:  No suicide attempts, manic or psychotic episodes.  She denies being on antidepressants.  No history of ADD, eating disorder, OCD.    FAMILY HISTORY:  Denies mental illness or chemical dependency.    SOCIAL HISTORY:  The patient is unmarried. Denies having children.  She is a  who just lost her job.  She lives in [place] in an apartment alone.  Denies legal problems.    LABORATORY STUDIES:  Presenting BAL 0.32.  She has an elevated AST of 167.    PHYSICAL EXAMINATION:    VITAL SIGNS:  Temperature 98.9, pulse 101, /98, respiratory rate 18, weight 83.9 " kilograms.    MENTAL STATUS EXAMINATION:  The patient is somnolent, but arousable, oriented to person, year, month.  She made no clearly delusional statement, but appears to lack insight significantly into her health issues and was minimizing her alcohol use.  She was somewhat guarded.  No psychosis/hallucinations.  She has been avoidant, but is agreeing to accept medical treatment.  No overt memory disturbance, but her mental status exam is limited due to sedation and she has been receiving gabapentin and lorazepam.  She denies active suicidal thoughts or plan.  Reports the ideation was closer to her week of job loss.  No thoughts of harm to others.    PSYCHIATRIC DIAGNOSES:  Alcohol use disorder, severe, current withdrawal, unspecified depressive disorder; chronic medical issues including type 2 diabetes mellitus, sarcoidosis, hypertension and new onset neuropathy, likely secondary to alcoholism and diabetes.    PLAN:  Case discussed with Dr. Chaudhary.   will see her Monday and she should see CD  if has insurance otherwise, we will need to go through Rule 25.  At this point, she is agreeable to no further alcohol use.  I did discuss starting her on Lexapro.  She may be sent out with low dose gabapentin, but this will be per hospital service.      Please call with questions and concerns and thank you for the consultation.    Lana Pappas MD        D: 2022   T: 2022   MT: [id number]    Name:     BRITTNI YEH  MRN:      [id number]        Account:      [id number]   :      1970           Consult Date: 2022     Document: [id number]

## 2022-04-17 NOTE — CONSULTS
"NUTRITION ASSESSMENT    REASON FOR NUTRITION CONSULT:  Provider Order  - \"malnutrition assessment\"    ASSESSMENT:  Unable to complete nutrition assessment due to dietitian off site    FOLLOW UP:   Will follow up when patient and/or dietitian available    Jammie Clemens RD, LD  Clinical Dietitian         "

## 2022-04-17 NOTE — PLAN OF CARE
Lethargic. Sleeping between cares. Ox3, disoriented to time. VSS on RA except HTN and tachycardia. Up with SBA/assist of 1, gait belt to BR. Voiding in BR with incontinence at times. Purewick in place. C/o dizziness, EKG results pending. CIWA 7 and less. C/o intermittent nausea then falls back to sleep. Regular diet, tolerating. IVF running. K+ replaced. Recheck in AM. Replacing magnesium. Psych/social work following. Nursing will continue to monitor.

## 2022-04-17 NOTE — ED NOTES
DATE:  4/17/2022   TIME OF RECEIPT FROM LAB:  0231  LAB TEST:  Ketone  LAB VALUE:  6.7  RESULTS GIVEN WITH READ-BACK TO (PROVIDER):  Delgado  TIME LAB VALUE REPORTED TO PROVIDER:   023

## 2022-04-17 NOTE — ED PROVIDER NOTES
History   Chief Complaint:  Shortness of Breath       JOE Corral is a 51 year old female with history of diabetes, hypertension, depression, alcohol abuse amongst others. She was recently admitted to the hospital 3/26-3/28 for pneumonia and discharged home with azithromycin.  She reports not finishing this antibiotic on discharge as she didn't have money to fill it. She notes a persistent productive cough since discharge from the hospital. Today she presents with shortness of breath that she developed while getting up from the bathroom a few hours ago. At this time she also developed lightheadedness, chills, and numbness of her feet. She reports significant anxiety and attributes this to losing her job.  She complains additionally of chest pain, nausea, and vomiting secondary to losing her job as a  last week. She does admit to suicidal ideation by overdose of pain pills she recently received for a dental procedure. Mentions that she lives alone. Recent alcohol use. No drug use. No headache, abdominal pain or other symptoms.     Review of Systems   Constitutional: Positive for chills and diaphoresis.   Respiratory: Positive for cough and shortness of breath.    Cardiovascular: Positive for chest pain.   Gastrointestinal: Positive for nausea and vomiting.   Neurological: Positive for light-headedness and numbness.   Psychiatric/Behavioral: Positive for suicidal ideas.   All other systems reviewed and are negative.    Allergies:  The patient has no known allergies.     Medications:  Albuterol  Coreg  Neurontin  Cozaar    Past Medical History:     Alcohol abuse  Diabetes  Asthma  Carpal tunnel syndrome  Hypertension  Sarcoidosis  Subdural hematoma  Suicidal ideation  Hypertensive urgency  Depression  Pneumonia   Alcohol-induced acute pancreatitis  Lymphadenopathy    Past Surgical History:    Cholecystectomy    Family History:    Father: Diabetes, hypertension  Mother: Diabetes, heart  disease, hypertension  Brother: Heart disease, CABG    Social History:  The patient presents to the ED alone.  Was a pre-. Just lost her job last week.     Physical Exam     Patient Vitals for the past 24 hrs:   BP Temp Temp src Pulse Resp SpO2   04/17/22 0000 -- -- -- 105 18 95 %   04/16/22 2330 139/84 -- -- 114 29 98 %   04/16/22 2245 (!) 148/98 -- -- 112 19 99 %   04/16/22 2230 (!) 144/86 -- -- 103 8 98 %   04/16/22 2228 (!) 164/109 98.1  F (36.7  C) Oral 99 14 99 %       Physical Exam  Nursing note and vitals reviewed.  Constitutional: Well nourished.   Eyes: Conjunctiva normal.  Pupils are equal, round, and reactive to light.   ENT: Nose normal. Mucous membranes pink and moist.    Neck: Normal range of motion.  CVS: Normal rate, regular rhythm.  Normal heart sounds.  No murmur.  Pulmonary: Lungs with scant expiratory wheezing  GI: Abdomen soft. Nontender, nondistended. No rigidity or guarding.    MSK: No calf tenderness or swelling.  Neuro: Alert. Follows simple commands. No tremors. Moves all extremities equally and symmetrically  Skin: Skin is warm and dry. No rash noted.   Psychiatric: Flat affect, slurring words, admits to suicidal ideations    Emergency Department Course   ECG  ECG obtained at 2339, ECG read at 2339  Normal sinus rhythm. Possible left atrial enlargement. Left ventricular hypertrophy with repolarization abnormality. Prolonged QT. Abnormal ECG.   No significant changes as compared to prior, dated 3/26/22.  Rate 100 bpm. ME interval 144 ms. QRS duration 98 ms. QT/QTc 414/534 ms. P-R-T axes 13 22 19.     Imaging:  XR Chest Port 1 View   Final Result   IMPRESSION: The chest is stable with again seen minimal prominence of the right perihilar lung markings superiorly and this could represent some minimal underlying infiltrate/atelectasis. No other acute cardiopulmonary abnormalities are suggested. There    are significant hypertrophic changes seen most prominent in the lower  thoracic spine to left of midline.        Report per radiology    Laboratory:  Labs Ordered and Resulted from Time of ED Arrival to Time of ED Departure   COMPREHENSIVE METABOLIC PANEL - Abnormal       Result Value    Sodium 140      Potassium 2.9 (*)     Chloride 94      Carbon Dioxide (CO2) 23      Anion Gap 23 (*)     Urea Nitrogen 10      Creatinine 0.45 (*)     Calcium 8.5      Glucose 65 (*)     Alkaline Phosphatase 101       (*)     ALT 45      Protein Total 7.8      Albumin 3.5      Bilirubin Total 0.6      GFR Estimate >90     LIPASE - Abnormal    Lipase 2,899 (*)    MAGNESIUM - Abnormal    Magnesium 1.2 (*)    ETHYL ALCOHOL LEVEL - Abnormal    Alcohol ethyl 0.32 (*)    CBC WITH PLATELETS AND DIFFERENTIAL - Abnormal    WBC Count 7.3      RBC Count 2.84 (*)     Hemoglobin 10.5 (*)     Hematocrit 28.2 (*)     MCV 99      MCH 37.0 (*)     MCHC 37.2 (*)     RDW 15.9 (*)     Platelet Count 206      % Neutrophils 77      % Lymphocytes 13      % Monocytes 10      % Eosinophils 0      % Basophils 0      % Immature Granulocytes 0      NRBCs per 100 WBC 0      Absolute Neutrophils 5.6      Absolute Lymphocytes 1.0      Absolute Monocytes 0.8      Absolute Eosinophils 0.0      Absolute Basophils 0.0      Absolute Immature Granulocytes 0.0      Absolute NRBCs 0.0     POTASSIUM - Abnormal    Potassium 2.7 (*)    ETHYL ALCOHOL LEVEL - Abnormal    Alcohol ethyl 0.29 (*)    TROPONIN I - Normal    Troponin I High Sensitivity 16     INFLUENZA A/B & SARS-COV2 PCR MULTIPLEX        Emergency Department Course:         Reviewed:  I reviewed nursing notes, vitals and past medical history    Assessments:  2313 I obtained history and examined the patient as noted above.     0124 I rechecked the patient and explained findings.     0207 I rechecked the patient.     0240 Informed by nursing that patient pulled out her IV. Additional IV replaced    Consults:  0144 I spoke with Dr. Vaz, hospitalist, who accepts the patient.      Interventions:  0003 Proventil 2.5 mg Nebulization  0005 Ativan 0.5 mg PO  0009 Solu-medrol 125 mg IV  0134 Klor-con 40 mEq PO  0134 Magnesium sulfate 2 g IV  0207 Potassium chloride infusion 10 mEq IV    Disposition:  The patient was admitted to the hospital under the care of Dr. Vaz.     Impression & Plan     Medical Decision Making:  Patient is a 51-year-old female presenting with a myriad of complaints predominantly shortness of breath, cough and suicidal ideations.  She underwent an extensive work-up during her time in the ED.  She was placed on a hold on her arrival to the ED given her reported suicidal ideations though remained cooperative during her time in the ED.  She is acutely intoxicated today.  Screening EKG without STEMI though noted prolonged QTC.  She is noted to have profound electrolyte derangements notably hypokalemia and hypomagnesemia.  Replacement was initiated during her time in the ED.  Mild transaminitis and elevated lipase, though no significant abdominal tenderness.  I doubt intraabdominal catastrophe and do not feel emergent imaging is warranted.  Patient with high anion gap, I suspect more 2/2 to alcohol ketosis; doubt methanol ingestion given normal kidney function.  Her blood glucose was labile on arrival though uptrended with juice.      Patient did undergo formal CXR given her reported dyspnea and no obvious pneumonia identified.  She ambulates without hypoxia or significant work of breathing.  She did have scant expiratory wheezing on arrival and was given albuterol and IV steroids which she reported helped. High sensitivity screening troponin negative, doubt ACS.  Doubt PE based on clinical presentation.      Patient admitted for electrolyte replacement on telemetry bed and to benefit from psychiatry consultation when medically cleared.      Diagnosis:    ICD-10-CM    1. Hypokalemia  E87.6    2. Hypomagnesemia  E83.42    3. Abnormal electrocardiogram  R94.31     prolonged QTc    4. Dyspnea, unspecified type  R06.00    5. Alcoholic intoxication without complication (H)  F10.920    6. Suicidal ideation  R45.851    7. Hypoglycemia  E16.2    8. High anion gap metabolic acidosis  E87.2      Scribe Disclosure:  I, David Diaz, am serving as a scribe at 11:25 PM on 4/16/2022 to document services personally performed by Lisa Delgado DO based on my observations and the provider's statements to me.            Lisa Delgado DO  04/17/22 0252

## 2022-04-17 NOTE — PROGRESS NOTES
"Care Management Follow Up    Length of Stay (days): 0    Expected Discharge Date: 04/19/2022     Concerns to be Addressed:       Patient plan of care discussed at interdisciplinary rounds: No    Anticipated Discharge Disposition:       Anticipated Discharge Services:    Anticipated Discharge DME:      Patient/family educated on Medicare website which has current facility and service quality ratings:    Education Provided on the Discharge Plan:    Patient/Family in Agreement with the Plan:      Referrals Placed by CM/SW:    Private pay costs discussed: Not applicable    Additional Information:  SW is aware of the need to assess the patient. Per chart review \"her mental status exam is limited due to sedation.\"     SW will evaluate on Monday.     MOY Villalta, LGSW  , ED Care Management         Lisa Guzman      "

## 2022-04-17 NOTE — ED NOTES
"[facility]  ED Nurse Handoff Report    Jose Corral is a 51 year old female   ED Chief complaint: Shortness of Breath  . ED Diagnosis:   Final diagnoses:   Hypokalemia   Hypomagnesemia   Abnormal electrocardiogram - prolonged QTc   Dyspnea, unspecified type   Alcoholic intoxication without complication (H)   Suicidal ideation   Hypoglycemia   High anion gap metabolic acidosis     Allergies: No Known Allergies    Code Status: Full Code  Activity level - Baseline/Home:  Stand by Assist. Activity Level - Current:   Assist X 2. Lift room needed: No. Bariatric: No   Needed: No   Isolation: No. Infection: Not Applicable.     Vital Signs:   Vitals:    04/16/22 2330 04/17/22 0000 04/17/22 0204 04/17/22 0206   BP: 139/84      Pulse: 114 105 103 105   Resp: 29 18 17 20   Temp:       TempSrc:       SpO2: 98% 95% (!) 89% 91%       Cardiac Rhythm:  ,      Pain level:    Patient confused: No. Patient Falls Risk: Yes.   Elimination Status: Has voided   Patient Report - Initial Complaint: Shortness of breath.Patient arrives via EMS. EMS reports they were called with reports of patient drinking with friends. Patient fell and difficulty standing up. Friends were concerned and called. Patient complained of shortness of breath. Pt reports she \"always has shortness of breath and asthma.\" EMS reports LS clear 100% on room air. BG 98 PTA. According to [id number] with EMS. Denies taking BP meds due to inability to pay. NSR for EMS. Denies chest pain at this time.      Tests Performed:   Labs Ordered and Resulted from Time of ED Arrival to Time of ED Departure   COMPREHENSIVE METABOLIC PANEL - Abnormal       Result Value    Sodium 140      Potassium 2.9 (*)     Chloride 94      Carbon Dioxide (CO2) 23      Anion Gap 23 (*)     Urea Nitrogen 10      Creatinine 0.45 (*)     Calcium 8.5      Glucose 65 (*)     Alkaline Phosphatase 101       (*)     ALT 45      Protein Total 7.8      Albumin 3.5      " Bilirubin Total 0.6      GFR Estimate >90     LIPASE - Abnormal    Lipase 2,899 (*)    MAGNESIUM - Abnormal    Magnesium 1.2 (*)    ETHYL ALCOHOL LEVEL - Abnormal    Alcohol ethyl 0.32 (*)    CBC WITH PLATELETS AND DIFFERENTIAL - Abnormal    WBC Count 7.3      RBC Count 2.84 (*)     Hemoglobin 10.5 (*)     Hematocrit 28.2 (*)     MCV 99      MCH 37.0 (*)     MCHC 37.2 (*)     RDW 15.9 (*)     Platelet Count 206      % Neutrophils 77      % Lymphocytes 13      % Monocytes 10      % Eosinophils 0      % Basophils 0      % Immature Granulocytes 0      NRBCs per 100 WBC 0      Absolute Neutrophils 5.6      Absolute Lymphocytes 1.0      Absolute Monocytes 0.8      Absolute Eosinophils 0.0      Absolute Basophils 0.0      Absolute Immature Granulocytes 0.0      Absolute NRBCs 0.0     POTASSIUM - Abnormal    Potassium 2.7 (*)    ETHYL ALCOHOL LEVEL - Abnormal    Alcohol ethyl 0.29 (*)    LACTIC ACID WHOLE BLOOD - Abnormal    Lactic Acid 2.1 (*)    KETONE BETA-HYDROXYBUTYRATE QUANTITATIVE, RAPID - Abnormal    Ketone (Beta-Hydroxybutyrate) Quantitative 6.7 (*)    ROUTINE UA WITH MICROSCOPIC REFLEX TO CULTURE - Abnormal    Color Urine Orange (*)     Appearance Urine Cloudy (*)     Glucose Urine Negative      Bilirubin Urine Small (*)     Ketones Urine >150 (*)     Specific Gravity Urine 1.035      Blood Urine Trace (*)     pH Urine 6.0      Protein Albumin Urine >600 (*)     Urobilinogen Urine 8.0 (*)     Nitrite Urine Negative      Leukocyte Esterase Urine Negative      RBC Urine 15 (*)     WBC Urine 8 (*)     Squamous Epithelials Urine 22 (*)    TROPONIN I - Normal    Troponin I High Sensitivity 16     GLUCOSE BY METER - Normal    GLUCOSE BY METER POCT 92     INFLUENZA A/B & SARS-COV2 PCR MULTIPLEX   BLOOD GAS VENOUS   GLUCOSE MONITOR NURSING POCT   PROCALCITONIN   BLOOD CULTURE   BLOOD CULTURE     XR Chest Port 1 View   Final Result   IMPRESSION: The chest is stable with again seen minimal prominence of the right perihilar  lung markings superiorly and this could represent some minimal underlying infiltrate/atelectasis. No other acute cardiopulmonary abnormalities are suggested. There    are significant hypertrophic changes seen most prominent in the lower thoracic spine to left of midline.        . Abnormal Results: See above.   Treatments provided: See MAR  Family Comments: QIANA  OBS brochure/video discussed/provided to patient:  Yes  ED Medications:   Medications   potassium chloride 10 mEq in 100 mL sterile water intermittent infusion (premix) (10 mEq Intravenous New Bag 4/17/22 0207)   sodium chloride 0.9 % 1,000 mL with Infuvite Adult 10 mL, thiamine 100 mg, folic acid 1 mg infusion ( Intravenous New Bag 4/17/22 0303)   dextrose 5% in lactated ringers infusion (has no administration in time range)   albuterol (PROVENTIL) neb solution 2.5 mg (2.5 mg Nebulization Given 4/17/22 0003)   methylPREDNISolone sodium succinate (solu-MEDROL) injection 125 mg (125 mg Intravenous Given 4/17/22 0009)   LORazepam (ATIVAN) tablet 0.5 mg (0.5 mg Oral Given 4/17/22 0005)   potassium chloride ER (KLOR-CON M) CR tablet 40 mEq (40 mEq Oral Given 4/17/22 0134)   magnesium sulfate 2 g in water intermittent infusion (2 g Intravenous New Bag 4/17/22 0134)     Drips infusing:  Yes  For the majority of the shift, the patient's behavior Green.     Sepsis treatment initiated: No     Patient tested for COVID 19 prior to admission: YES    ED Nurse Name/Phone Number: Brandi Stubbs RN,   3:06 AM  RECEIVING UNIT ED HANDOFF REVIEW    Above ED Nurse Handoff Report was reviewed: Yes  Reviewed by: Elizabeth Sharma RN on April 17, 2022 at 3:45 AM

## 2022-04-17 NOTE — PLAN OF CARE
"Care from 0340-0700    Inpatient Progress Note:  For complete assessment see flow sheet documentation.  Vital signs:  Temp: 98.9  F (37.2  C) Temp src: Oral BP: (!) 156/98 Pulse: 101   Resp: 18 SpO2: 95 % O2 Device: None (Room air) Oxygen Delivery: 2 LPM Height: 162.6 cm (5' 4\") Weight: 83.9 kg (185 lb)  Estimated body mass index is 31.76 kg/m  as calculated from the following:    Height as of this encounter: 1.626 m (5' 4\").    Weight as of this encounter: 83.9 kg (185 lb).    Per notes in ED, patient did not want any staff member to discuss alcohol use with brother, Emre.    Orientation: A&O x self/place. Patient out, snoring, sleeping most of shift. Alert to sound of name.  Neuro: CIWA less than 8. Complains of numbness/tingling bilateral feet. Didn't want anyone to exam feet, but then inquired why no one has assessed her feet.  Pain status: Complained of headache back of head. Scheduled Tylenol and gabapentin given  Activity: She did not get out of bed. Patient has bedside attendant due to suicidal ideation.  Peripheral edema: None noted  Resp: Cough- dry, infrequent. Otherwise LS clear  Cardiac: Tele: ST/SR  GI: WNL  :  WNL, Has pure wick in place  LDA: PIV  Infusions: Dextrose with LR infusing from ED. LR to follow after  Pertinent Labs: Blood ethanol initially in ED 0.32, then 0.29. Ketones 6.7, Lactate: 2.1    RN managed protocol  Potassium/Magnesium  Diet: Regular  Consults: SW for chemical dependency assessment; Psych for suicidal ideation  Discharge Plan: TBD    Will continue to monitor and provide cares.     Elizabeth Sharma RN                       "

## 2022-04-17 NOTE — PROGRESS NOTES
"    Hospitalist Progress Note    Date of Service (when I saw the patient): 04/17/2022  Provider:  Endy Chaudhary MD   Text Page  7am - 6PM       Assessment & Plan   Jose Corral is a 51 year old female with past medical history significant for sarcoidosis, type 2 diabetes mellitus, hypertension, and severe alcohol use disorder who was admitted on 4/16/2022 with acute alcohol intoxication, severe electrolyte disturbances and suicidal ideation.     Acute Alcohol Intoxication  Severe Alcohol Use Disorder  Blood ethanol 0.32 --> 0.29, though the patient denies alcohol use prior to having \"a couple of drinks\" this evening.    Suspect chronic alcoholism despite her denial. Patient does not want alcohol use discussed with her brother, Emre Corral, who is listed as her emergency contact.  -CIWA  -Clonidine, Gabapentin, trigger-dose benzos  -Social work consult    Alcohol induced pancreatitis. Elevated Lipase  Lipase 2899. Denies abdominal pain.  Suspect chronic pancreatitis versus low-grade acute pancreatitis in the setting of significant alcohol use.  Recommend complete alcohol cessation.       Suicidal Ideation  Endorsed SI due to loss of job and other significant stressors in her life. Did explain a lose plan of using pain medications she received for a dental procedure. Patient placed on hold in emergency department. Ordering bedside attendant, suicide precautions and psychiatric evaluation on admission.  -Psychiatric consultation completed, no SI or plan, no intention to harm others.  -Suicide precautions discontinue     Anion Gap Metabolic Acidosis  Alcoholic Ketosis  Lactic Acidosis  Anion gap 23. Lactate 2.1, likely due to dehydration vs alcohol-induced. Ketones 6.7, suspect related to starvation vs alcohol use. Providing D5LR mIVF + LR boluses PRN. Recheck lactate and ketones 1 hour after bolus. Cannot rule out possible methanol ingestion given severe alcohol use and " suicidal idea, however creatinine is within normal limits. Obtaining blood cultures and urinalysis, though infection not likely in setting of afebrile, hemodynamic stability and normal WBC.  -mIVF: D5LR + bolus PRN  -BCx, UCx pending  -Recheck Ketones, Lactic Acid after bolus     High risk for Refeeding Syndrome  Severe hypokalemia (K 2.9 --> 2.7) and magnesemia (Mg 1.2) in setting of significant alcohol use. Checking phosphate. Replacing aggressively. Rechecking post replacement.  -Potassium + Magnesium replacement protocol  -Phosphorus     Prolonged QTc  Suspect related to electrolyte abnormalities vs alcohol use  Replacing electrolytes per protocol. Monitoring; Avoid QT prolonging medications.    Chronic Anemia: Hemoglobin 10.5; at baseline. CBC daily. Iron studies by PCP.  Type 2 Diabetes Mellitus: Type 2 diabetes mellitus noted in her chart, however her A1c from 1 year prior was 5.2.  No updated A1c excellent chart.  Will recheck today.  Hypertension: Pending med rec.  Sarcoidosis: Diagnosed in [place] 2010. Not currently following with provider. Pulmonology referral on discharge.  Tobacco Use Disorder: Patient did not want nicotine patches.  Diet:   Moderate Carb Consistent  Sanchez Catheter: Not present  Central Lines: None  Cardiac Monitoring: N    DVT Prophylaxis: Heparin SQ  Code Status: Full Code    Disposition: Expected Discharge: Likely 2-3 days pending clinical improvement       Interval History   She is feeling better this morning, denies pain in her abdomen, no nausea or vomiting.  She has tolerated small amount of breakfast tomorrow.  Denies significant withdrawal syndrome in the past.  Very mild tremors.  She was seen by psychiatry today.  She denies suicidal intentions or.  Plan    -Data reviewed today: I reviewed all new labs and imaging results over the last 24 hours.     Physical Exam   Temp: 98.9  F (37.2  C) Temp src: Oral BP: (Abnormal) 156/98 Pulse: 101   Resp: 18 SpO2: 95 % O2 Device: None  (Room air) Oxygen Delivery: 2 LPM  Vitals:    04/17/22 0551   Weight: 83.9 kg (185 lb)     Vital Signs with Ranges  Temp:  [98.1  F (36.7  C)-98.9  F (37.2  C)] 98.9  F (37.2  C)  Pulse:  [] 101  Resp:  [8-29] 18  BP: (139-164)/() 156/98  SpO2:  [89 %-99 %] 95 %  No intake/output data recorded.    GEN:  Alert, oriented x 3, appears comfortable, NAD.  HEENT:  Normocephalic/atraumatic, no scleral icterus, no nasal discharge, mouth moist.  CV:  Regular rate and rhythm, no murmur or JVD.  S1 + S2 noted, no S3 or S4.  LUNGS:  Clear to auscultation bilaterally without rales/rhonchi/wheezing/retractions.  Symmetric chest rise on inhalation noted.  ABD:  Active bowel sounds, soft, non-tender/non-distended.  No rebound/guarding/rigidity.  EXT:  No edema or cyanosis.  No joint synovitis noted.  SKIN:  Dry to touch, no exanthems noted in the visualized areas.       Medications     dextrose 5% lactated ringers 125 mL/hr at 04/17/22 0445     lactated ringers         acetaminophen  975 mg Oral Q8H     cloNIDine  0.1 mg Oral Q8H     folic acid  1 mg Intravenous Daily     [START ON 4/24/2022] gabapentin  100 mg Oral Q8H     [START ON 4/22/2022] gabapentin  300 mg Oral Q8H     [START ON 4/20/2022] gabapentin  600 mg Oral Q8H     gabapentin  900 mg Oral Q8H     heparin ANTICOAGULANT  5,000 Units Subcutaneous Q12H     insulin aspart  1-7 Units Subcutaneous TID AC     insulin aspart  1-5 Units Subcutaneous At Bedtime     magnesium oxide  400 mg Oral BID     polyethylene glycol  17 g Oral Daily     sodium chloride (PF)  3 mL Intracatheter Q8H     thiamine  500 mg Intravenous TID       Data   Recent Labs   Lab 04/17/22  0547 04/17/22  0513 04/17/22  0233 04/17/22  0121 04/17/22  0007   WBC  --   --   --   --  7.3   HGB  --   --   --   --  10.5*   MCV  --   --   --   --  99   PLT  --   --   --   --  206   NA  --   --   --   --  140   POTASSIUM  --  3.1*  --  2.7* 2.9*   CHLORIDE  --   --   --   --  94   CO2  --   --   --   --   23   BUN  --   --   --   --  10   CR  --   --   --   --  0.45*   ANIONGAP  --   --   --   --  23*   SABINO  --   --   --   --  8.5   *  --  92  --  65*   ALBUMIN  --   --   --   --  3.5   PROTTOTAL  --   --   --   --  7.8   BILITOTAL  --   --   --   --  0.6   ALKPHOS  --   --   --   --  101   ALT  --   --   --   --  45   AST  --   --   --   --  167*   LIPASE  --   --   --   --  2,899*       Recent Results (from the past 24 hour(s))   XR Chest Port 1 View    Narrative    EXAM: XR CHEST PORT 1 VIEW  LOCATION: [facility]  DATE/TIME: 4/16/2022 11:50 PM    INDICATION: Cough.  COMPARISON: 03/26/2022.      Impression    IMPRESSION: The chest is stable with again seen minimal prominence of the right perihilar lung markings superiorly and this could represent some minimal underlying infiltrate/atelectasis. No other acute cardiopulmonary abnormalities are suggested. There   are significant hypertrophic changes seen most prominent in the lower thoracic spine to left of midline.         Securely message with the Vocera Web Console (learn more here)  Text page via [employer] Paging/Directory        Disclaimer: This note consists of symbols derived from keyboarding, dictation and/or voice recognition software. As a result, there may be errors in the script that have gone undetected. Please consider this when interpreting information found in this chart.

## 2022-04-17 NOTE — H&P
"[facility]    History and Physical - Hospitalist Service       Date of Admission:  4/16/2022    Assessment & Plan      Jose Corral is a 51 year old female with past medical history significant for sarcoidosis, type 2 diabetes mellitus, hypertension, and severe alcohol use disorder who was admitted on 4/16/2022 with acute alcohol intoxication, severe electrolyte disturbances and suicidal ideation.    Acute Alcohol Intoxication  Severe Alcohol Use Disorder  Blood ethanol 0.32 --> 0.29, though the patient denies alcohol use prior to having \"a couple of drinks\" this evening.  I suspect that this is been ongoing for many days as the patient is still awake and coherent even in the setting of such a high blood ethanol level.  Patient does not want alcohol use discussed with her brother, Emre Corral, who is listed as her emergency contact.  -CIWA  -Clonidine, Gabapentin, trigger-dose benzos  -Social work consult    Suicidal Ideation  Endorsed SI due to loss of job and other significant stressors in her life. Did explain a lose plan of using pain medications she received for a dental procedure. Patient placed on hold in emergency department. Ordering bedside attendant, suicide precautions and psychiatric evaluation on admission.  -Suicide precautions  -1:1  -Psychiatric consultation    Anion Gap Metabolic Acidosis  Alcoholic Ketosis  Lactic Acidosis  Anion gap 23. Lactate 2.1, likely due to dehydration vs alcohol-induced. Ketones 6.7, suspect related to starvation vs alcohol use. Providing D5LR mIVF + LR boluses PRN. Recheck lactate and ketones 1 hour after bolus. Cannot rule out possible methanol ingestion given severe alcohol use and suicidal idea, however creatinine is within normal limits. Obtaining blood cultures and urinalysis, though infection not likely in setting of afebrile, hemodynamic stability and normal WBC.  -mIVF: D5LR + bolus PRN  -BCx, UCx pending  -Recheck Ketones, Lactic " "Acid after bolus    Possible Refeeding Syndrome  Severe hypokalemia (K 2.9 --> 2.7) and magnesemia (Mg 1.2) in setting of significant alcohol use. Checking phosphate. Replacing aggressively. Rechecking post replacement.  -Potassium + Magnesium replacement protocol  -Phosphorus    Prolonged QTc  Suspect related to electrolyte abnormalities vs alcohol use. Replacing electrolytes as above. Monitoring; repeat ECG in morning. Avoid QT prolonging medications.    Elevated Lipase  Lipase 2899.  Though denies abdominal pain.  Suspect that she probably has a component of chronic pancreatitis versus low-grade acute pancreatitis in the setting of significant alcohol use.  Recommend complete alcohol cessation.    Chronic Anemia: Hemoglobin 10.5; at baseline. CBC daily. Iron studies by PCP.  Type 2 Diabetes Mellitus: Type 2 diabetes mellitus noted in her chart, however her A1c from 1 year prior was 5.2.  No updated A1c excellent chart.  Will recheck today.  Hypertension: Pending med rec.  Sarcoidosis: Diagnosed in [place] 2010. Not currently following with provider. Pulmonology referral on discharge.  Tobacco Use Disorder: Patient did not want nicotine patches.     Diet:   Moderate Carb Consistent  DVT Prophylaxis: Heparin SQ  Sanchez Catheter: Not present  Central Lines: None  Cardiac Monitoring: None  Code Status:   Full  Expected Discharge: Likely 2-3 days pending clinical improvement and psychiatry consult.     The patient's care was discussed with the Bedside Nurse and Patient.    Chas Vaz MD, S  Hospitalist Service  [facility]  Securely message with the Vocera Web Console (learn more here)  Text page via [place] Emergency Registry Paging/Directory         ______________________________________________________________________    Chief Complaint   \"Dizziness\"    History is obtained from the patient    History of Present Illness   Jose Corral is a 51 year old female with past medical history significant for types 2 " "diabetes mellitus, hypertension, and alcohol use disorder who presented to [facility] on the evening of 4/16/2022 via EMS after friends called on her behalf.     Patient was reportedly drinking with friends earlier on the evening of 4/16/2022 when she had decreased level of conciousness. She reportedly fell and was not able to stand. EMS was called and patient was transported to emergency department.  Patient reported that she was feeling somewhat dizzy, but this was prior to any alcohol use.  She stated that she does not have money to drink alcohol at home, and did not have any alcohol prior to having \"a couple of drinks\" at her friends this evening.  She stated that she was short of breath, but that this is normal for her as she has asthma. Of note, she was recently admitted for community-acquired pneumonia but did not finish her Azithromycin on discharge home.  She is having some heart palpitations.  She endorses some chronic numbness in bilateral lower extremities that is now extending upward over the past few weeks.    Upon arrival to the emergency department she was noted to be hemodynamically stable. She was slightly tachycardic with rates 105-114. She was afebrile. Laboratory studies revealed significant ethanol intoxication with serum levels of 0.32. Lipase was elevated to 2899, though patient denied abdominal pain. Her potassium was low at 2.9, downtrending to 2.7 after replacement initiated. Magnesium was 1.2. Metabolic acidosis with anion gap 23 was evident, with elevated ketones to 6.7 and lactic acid to 2.1. She received IVF and electrolyte replacement and was admitted for further cares.    Of note, during patient evaluation she stated that she did have SI with a lose plan. She was placed on a medical hold and admitted for further evaluation and care.    Review of Systems    A full 10+ point review of systems was performed and found to be negative with the exception of those items noted " in the HPI above.    Past Medical History    I have reviewed this patient's medical history and updated it with pertinent information if needed.   Asthma       Diabetes mellitus type 2       Lymphadenopathy   Hilar Mediastinal   Obesity       Sarcoidosis       Allergic rhinitis       Depression     Alcohol Use Disorder      Past Surgical History   I have reviewed this patient's surgical history and updated it with pertinent information if needed.  Cholecystectomy    Social History   I have reviewed this patient's social history and updated it with pertinent information if needed.  Social History     Tobacco Use     Smoking status: Current Every Day Smoker     Smokeless tobacco: Never Used   Substance Use Topics     Alcohol use: Yes     Drug use: Not Currently       Family History   Medical History Relation Name Comments   Heart Disease Brother    Recently had CBAG    Diabetes Father        High Blood Pressure Father        Stroke Maternal Grandfather        Diabetes Mother        Heart Disease Mother        High Blood Pressure Mother          Prior to Admission Medications   Prior to Admission Medications   Prescriptions Last Dose Informant Patient Reported? Taking?   albuterol (PROAIR HFA/PROVENTIL HFA/VENTOLIN HFA) 108 (90 Base) MCG/ACT inhaler   No No   Sig: Inhale 2 puffs into the lungs every 6 hours   carvedilol (COREG) 25 MG tablet   No No   Sig: Take 1 tablet (25 mg) by mouth 2 times daily (with meals)   cyanocobalamin (VITAMIN B-12) 2500 MCG SUBL sublingual tablet   No No   Sig: Place 1 tablet (2,500 mcg) under the tongue daily   fexofenadine (ALLEGRA ALLERGY) 180 MG tablet   No No   Sig: Take 1 tablet (180 mg) by mouth daily   folic acid (FOLVITE) 1 MG tablet   No No   Sig: Take 1 tablet (1 mg) by mouth daily   gabapentin (NEURONTIN) 100 MG capsule   No No   Sig: Take 2 capsules (200 mg) by mouth 2 times daily   losartan (COZAAR) 100 MG tablet   No No   Sig: Take 1 tablet (100 mg) by mouth daily    multivitamin w/minerals (THERA-VIT-M) tablet   No No   Sig: Take 1 tablet by mouth daily      Facility-Administered Medications: None     Allergies   No Known Allergies    Physical Exam   Vital Signs: Temp: 98.1  F (36.7  C) Temp src: Oral BP: 139/84 Pulse: 105   Resp: 18 SpO2: 95 % O2 Device: None (Room air)    Weight: 0 lbs 0 oz    General: Pleasant female resting comfortably in hospital bed.  Awake, alert, interactive.  Sitter at bedside.  HEENT: Normocephalic, atraumatic.  Patches of missing hair and posterior head.  PERRL, EOMI.  Conjunctiva clear, sclerae anicteric.  Cardiac: Tachycardic.  With regular rhythm.  No murmurs, gallops, rub.  No peripheral edema.  Respiratory: Clear to auscultation bilaterally without wheezes, rales, rhonchi.  Normal work of breathing on room air.  Abdominal: Soft, nontender.  Musculoskeletal: Slightly tremulous in bilateral upper extremities.  Otherwise moving extremities appropriately.  Skin: No rashes or abrasions on exposed skin.  Neurologic: Alert and oriented x4.  Cranial nerves II through XII grossly intact.  Psychiatric: Tearful at times.  Appears guarded when discussing alcohol use.    Data   All laboratory results and other diagnostic data is available in Epic and has been personally reviewed.  I personally reviewed the EKG tracing showing prolonged QTc and the chest x-ray image(s) showing increased perihilar markings on RML.    Recent Labs   Lab 04/17/22  0233 04/17/22  0121 04/17/22  0007   WBC  --   --  7.3   HGB  --   --  10.5*   MCV  --   --  99   PLT  --   --  206   NA  --   --  140   POTASSIUM  --  2.7* 2.9*   CHLORIDE  --   --  94   CO2  --   --  23   BUN  --   --  10   CR  --   --  0.45*   ANIONGAP  --   --  23*   SABINO  --   --  8.5   GLC 92  --  65*   ALBUMIN  --   --  3.5   PROTTOTAL  --   --  7.8   BILITOTAL  --   --  0.6   ALKPHOS  --   --  101   ALT  --   --  45   AST  --   --  167*   LIPASE  --   --  2,899*     Recent Results (from the past 24 hour(s))    XR Chest Port 1 View    Narrative    EXAM: XR CHEST PORT 1 VIEW  LOCATION: [facility]  DATE/TIME: 4/16/2022 11:50 PM    INDICATION: Cough.  COMPARISON: 03/26/2022.      Impression    IMPRESSION: The chest is stable with again seen minimal prominence of the right perihilar lung markings superiorly and this could represent some minimal underlying infiltrate/atelectasis. No other acute cardiopulmonary abnormalities are suggested. There   are significant hypertrophic changes seen most prominent in the lower thoracic spine to left of midline.

## 2022-04-18 LAB
ALBUMIN SERPL-MCNC: 3.1 G/DL (ref 3.4–5)
ALP SERPL-CCNC: 91 U/L (ref 40–150)
ALT SERPL W P-5'-P-CCNC: 31 U/L (ref 0–50)
ANION GAP SERPL CALCULATED.3IONS-SCNC: 6 MMOL/L (ref 3–14)
AST SERPL W P-5'-P-CCNC: 80 U/L (ref 0–45)
ATRIAL RATE - MUSE: 100 BPM
ATRIAL RATE - MUSE: 101 BPM
BILIRUB SERPL-MCNC: 1 MG/DL (ref 0.2–1.3)
BUN SERPL-MCNC: 14 MG/DL (ref 7–30)
CALCIUM SERPL-MCNC: 9.1 MG/DL (ref 8.5–10.1)
CHLORIDE BLD-SCNC: 96 MMOL/L (ref 94–109)
CO2 SERPL-SCNC: 31 MMOL/L (ref 20–32)
CREAT SERPL-MCNC: 0.65 MG/DL (ref 0.52–1.04)
CRP SERPL-MCNC: 25.3 MG/L (ref 0–8)
DIASTOLIC BLOOD PRESSURE - MUSE: NORMAL MMHG
DIASTOLIC BLOOD PRESSURE - MUSE: NORMAL MMHG
ERYTHROCYTE [DISTWIDTH] IN BLOOD BY AUTOMATED COUNT: 15.3 % (ref 10–15)
GFR SERPL CREATININE-BSD FRML MDRD: >90 ML/MIN/1.73M2
GLUCOSE BLD-MCNC: 186 MG/DL (ref 70–99)
GLUCOSE BLDC GLUCOMTR-MCNC: 148 MG/DL (ref 70–99)
GLUCOSE BLDC GLUCOMTR-MCNC: 148 MG/DL (ref 70–99)
GLUCOSE BLDC GLUCOMTR-MCNC: 168 MG/DL (ref 70–99)
GLUCOSE BLDC GLUCOMTR-MCNC: 171 MG/DL (ref 70–99)
GLUCOSE BLDC GLUCOMTR-MCNC: 241 MG/DL (ref 70–99)
HBA1C MFR BLD: 5.2 % (ref 0–5.6)
HCT VFR BLD AUTO: 26.3 % (ref 35–47)
HEMOCCULT STL QL: POSITIVE
HGB BLD-MCNC: 10.4 G/DL (ref 11.7–15.7)
HGB BLD-MCNC: 9.5 G/DL (ref 11.7–15.7)
INTERPRETATION ECG - MUSE: NORMAL
INTERPRETATION ECG - MUSE: NORMAL
LIPASE SERPL-CCNC: 5710 U/L (ref 73–393)
MAGNESIUM SERPL-MCNC: 1.6 MG/DL (ref 1.6–2.3)
MCH RBC QN AUTO: 36.5 PG (ref 26.5–33)
MCHC RBC AUTO-ENTMCNC: 36.1 G/DL (ref 31.5–36.5)
MCV RBC AUTO: 101 FL (ref 78–100)
P AXIS - MUSE: 13 DEGREES
P AXIS - MUSE: 46 DEGREES
PHOSPHATE SERPL-MCNC: 1.2 MG/DL (ref 2.5–4.5)
PLATELET # BLD AUTO: 136 10E3/UL (ref 150–450)
POTASSIUM BLD-SCNC: 3.5 MMOL/L (ref 3.4–5.3)
POTASSIUM BLD-SCNC: 4.1 MMOL/L (ref 3.4–5.3)
PR INTERVAL - MUSE: 144 MS
PR INTERVAL - MUSE: 150 MS
PROT SERPL-MCNC: 7.1 G/DL (ref 6.8–8.8)
QRS DURATION - MUSE: 94 MS
QRS DURATION - MUSE: 98 MS
QT - MUSE: 410 MS
QT - MUSE: 414 MS
QTC - MUSE: 531 MS
QTC - MUSE: 534 MS
R AXIS - MUSE: 22 DEGREES
R AXIS - MUSE: 22 DEGREES
RBC # BLD AUTO: 2.6 10E6/UL (ref 3.8–5.2)
SODIUM SERPL-SCNC: 133 MMOL/L (ref 133–144)
SYSTOLIC BLOOD PRESSURE - MUSE: NORMAL MMHG
SYSTOLIC BLOOD PRESSURE - MUSE: NORMAL MMHG
T AXIS - MUSE: 19 DEGREES
T AXIS - MUSE: 44 DEGREES
VENTRICULAR RATE- MUSE: 100 BPM
VENTRICULAR RATE- MUSE: 101 BPM
WBC # BLD AUTO: 9.1 10E3/UL (ref 4–11)

## 2022-04-18 PROCEDURE — 258N000003 HC RX IP 258 OP 636: Performed by: STUDENT IN AN ORGANIZED HEALTH CARE EDUCATION/TRAINING PROGRAM

## 2022-04-18 PROCEDURE — 250N000013 HC RX MED GY IP 250 OP 250 PS 637: Performed by: PSYCHIATRY & NEUROLOGY

## 2022-04-18 PROCEDURE — 85018 HEMOGLOBIN: CPT | Performed by: HOSPITALIST

## 2022-04-18 PROCEDURE — 250N000011 HC RX IP 250 OP 636: Performed by: STUDENT IN AN ORGANIZED HEALTH CARE EDUCATION/TRAINING PROGRAM

## 2022-04-18 PROCEDURE — 99233 SBSQ HOSP IP/OBS HIGH 50: CPT | Performed by: HOSPITALIST

## 2022-04-18 PROCEDURE — 83735 ASSAY OF MAGNESIUM: CPT | Performed by: HOSPITALIST

## 2022-04-18 PROCEDURE — 80053 COMPREHEN METABOLIC PANEL: CPT | Performed by: STUDENT IN AN ORGANIZED HEALTH CARE EDUCATION/TRAINING PROGRAM

## 2022-04-18 PROCEDURE — 84100 ASSAY OF PHOSPHORUS: CPT | Performed by: HOSPITALIST

## 2022-04-18 PROCEDURE — 36415 COLL VENOUS BLD VENIPUNCTURE: CPT | Performed by: HOSPITALIST

## 2022-04-18 PROCEDURE — 83690 ASSAY OF LIPASE: CPT | Performed by: HOSPITALIST

## 2022-04-18 PROCEDURE — 86140 C-REACTIVE PROTEIN: CPT | Performed by: HOSPITALIST

## 2022-04-18 PROCEDURE — 85027 COMPLETE CBC AUTOMATED: CPT | Performed by: STUDENT IN AN ORGANIZED HEALTH CARE EDUCATION/TRAINING PROGRAM

## 2022-04-18 PROCEDURE — 120N000004 HC R&B MS OVERFLOW

## 2022-04-18 PROCEDURE — 93010 ELECTROCARDIOGRAM REPORT: CPT | Performed by: INTERNAL MEDICINE

## 2022-04-18 PROCEDURE — 36415 COLL VENOUS BLD VENIPUNCTURE: CPT | Performed by: STUDENT IN AN ORGANIZED HEALTH CARE EDUCATION/TRAINING PROGRAM

## 2022-04-18 PROCEDURE — 93005 ELECTROCARDIOGRAM TRACING: CPT

## 2022-04-18 PROCEDURE — 250N000013 HC RX MED GY IP 250 OP 250 PS 637: Performed by: HOSPITALIST

## 2022-04-18 PROCEDURE — 84132 ASSAY OF SERUM POTASSIUM: CPT | Performed by: HOSPITALIST

## 2022-04-18 PROCEDURE — 250N000013 HC RX MED GY IP 250 OP 250 PS 637: Performed by: STUDENT IN AN ORGANIZED HEALTH CARE EDUCATION/TRAINING PROGRAM

## 2022-04-18 PROCEDURE — 83036 HEMOGLOBIN GLYCOSYLATED A1C: CPT | Performed by: HOSPITALIST

## 2022-04-18 PROCEDURE — 82272 OCCULT BLD FECES 1-3 TESTS: CPT | Performed by: HOSPITALIST

## 2022-04-18 RX ORDER — POTASSIUM CHLORIDE 1500 MG/1
20 TABLET, EXTENDED RELEASE ORAL ONCE
Status: COMPLETED | OUTPATIENT
Start: 2022-04-18 | End: 2022-04-18

## 2022-04-18 RX ORDER — MAGNESIUM OXIDE 400 MG/1
400 TABLET ORAL 2 TIMES DAILY
Status: DISCONTINUED | OUTPATIENT
Start: 2022-04-18 | End: 2022-04-18

## 2022-04-18 RX ADMIN — ACETAMINOPHEN 975 MG: 325 TABLET, FILM COATED ORAL at 04:27

## 2022-04-18 RX ADMIN — CLONIDINE HYDROCHLORIDE 0.1 MG: 0.1 TABLET ORAL at 04:27

## 2022-04-18 RX ADMIN — ACETAMINOPHEN 975 MG: 325 TABLET, FILM COATED ORAL at 11:50

## 2022-04-18 RX ADMIN — HEPARIN SODIUM 5000 UNITS: 5000 INJECTION INTRAVENOUS; SUBCUTANEOUS at 20:12

## 2022-04-18 RX ADMIN — Medication 400 MG: at 08:10

## 2022-04-18 RX ADMIN — GABAPENTIN 900 MG: 300 CAPSULE ORAL at 11:52

## 2022-04-18 RX ADMIN — POTASSIUM & SODIUM PHOSPHATES POWDER PACK 280-160-250 MG 2 PACKET: 280-160-250 PACK at 20:12

## 2022-04-18 RX ADMIN — FOLIC ACID 1 MG: 5 INJECTION, SOLUTION INTRAMUSCULAR; INTRAVENOUS; SUBCUTANEOUS at 08:29

## 2022-04-18 RX ADMIN — ESCITALOPRAM 5 MG: 5 TABLET, FILM COATED ORAL at 08:10

## 2022-04-18 RX ADMIN — GABAPENTIN 900 MG: 300 CAPSULE ORAL at 18:04

## 2022-04-18 RX ADMIN — THIAMINE HYDROCHLORIDE 500 MG: 100 INJECTION, SOLUTION INTRAMUSCULAR; INTRAVENOUS at 08:46

## 2022-04-18 RX ADMIN — HEPARIN SODIUM 5000 UNITS: 5000 INJECTION INTRAVENOUS; SUBCUTANEOUS at 08:21

## 2022-04-18 RX ADMIN — THIAMINE HYDROCHLORIDE 500 MG: 100 INJECTION, SOLUTION INTRAMUSCULAR; INTRAVENOUS at 20:24

## 2022-04-18 RX ADMIN — POTASSIUM CHLORIDE 20 MEQ: 1500 TABLET, EXTENDED RELEASE ORAL at 16:36

## 2022-04-18 RX ADMIN — GABAPENTIN 900 MG: 300 CAPSULE ORAL at 02:29

## 2022-04-18 RX ADMIN — CLONIDINE HYDROCHLORIDE 0.1 MG: 0.1 TABLET ORAL at 20:12

## 2022-04-18 RX ADMIN — CLONIDINE HYDROCHLORIDE 0.1 MG: 0.1 TABLET ORAL at 11:53

## 2022-04-18 RX ADMIN — THIAMINE HYDROCHLORIDE 500 MG: 100 INJECTION, SOLUTION INTRAMUSCULAR; INTRAVENOUS at 14:33

## 2022-04-18 RX ADMIN — ACETAMINOPHEN 975 MG: 325 TABLET, FILM COATED ORAL at 20:11

## 2022-04-18 RX ADMIN — Medication 400 MG: at 20:12

## 2022-04-18 RX ADMIN — POTASSIUM & SODIUM PHOSPHATES POWDER PACK 280-160-250 MG 2 PACKET: 280-160-250 PACK at 16:36

## 2022-04-18 ASSESSMENT — ACTIVITIES OF DAILY LIVING (ADL)
ADLS_ACUITY_SCORE: 20
DEPENDENT_IADLS:: INDEPENDENT
ADLS_ACUITY_SCORE: 16
ADLS_ACUITY_SCORE: 20
ADLS_ACUITY_SCORE: 16
ADLS_ACUITY_SCORE: 20
ADLS_ACUITY_SCORE: 16

## 2022-04-18 NOTE — PROVIDER NOTIFICATION
DATE:  4/18/2022   TIME OF RECEIPT FROM LAB:  1218  LAB TEST:  Occult blood  LAB VALUE:  Positive  RESULTS GIVEN WITH READ-BACK TO (PROVIDER):  Dr Chaudhary notified via page  TIME LAB VALUE REPORTED TO PROVIDER:   1213

## 2022-04-18 NOTE — CONSULTS
Care Management Initial Consult    General Information  Assessment completed with: Patient, patient  Type of CM/SW Visit: Initial Assessment    Primary Care Provider verified and updated as needed:     Readmission within the last 30 days:        Reason for Consult: community resources, discharge planning, substance use concerns  Advance Care Planning: Advance Care Planning Reviewed: no concerns identified  no concerns identified, wants no information on advance directives  General Information Comments: Lives alone & admits to drinking daily    Communication Assessment  Patient's communication style: spoken language (English or Bilingual)             Cognitive  Cognitive/Neuro/Behavioral: WDL  Level of Consciousness: alert  Arousal Level: opens eyes spontaneously  Orientation: disoriented to, time  Mood/Behavior: calm  Best Language: 0 - No aphasia  Speech: slow    Living Environment:   People in home: alone     Current living Arrangements: apartment      Able to return to prior arrangements: yes  Living Arrangement Comments: Lives alone able to return home    Family/Social Support:  Care provided by: self  Provides care for: no one, unable/limited ability to care for self  Marital Status: Single  Sibling(s), Other (specify) (friends)          Description of Support System: Involved    Support Assessment: Lacks adequate emotional support    Current Resources:   Patient receiving home care services: No     Community Resources: None  Equipment currently used at home: none  Supplies currently used at home: None    Employment/Financial:  Employment Status: unemployed, other (see comments) (recently fired from her job as pre-)        Financial Concerns: unemployed   Referral to Financial Worker: Yes  Finance Comments: no insurance    Lifestyle & Psychosocial Needs:  Social Determinants of Health     Tobacco Use: Not on file   Alcohol Use: Not on file   Financial Resource Strain: Not on file   Food  Insecurity: Not on file   Transportation Needs: Not on file   Physical Activity: Not on file   Stress: Not on file   Social Connections: Not on file   Intimate Partner Violence: Not on file   Depression: Not on file   Housing Stability: Not on file       Functional Status:  Prior to admission patient needed assistance:   Dependent ADLs:: Independent  Dependent IADLs:: Independent  Assesssment of Functional Status: Not at baseline with ADL Functioning, Not at baseline with mobility, Not at  functional baseline    Mental Health Status:  Mental Health Status: No Current Concerns       Chemical Dependency Status:  Chemical Dependency Status: Current Concern  Chemical Dependency Management: Other (see comment) (wants inpatient CD treatment)          Values/Beliefs:  Spiritual, Cultural Beliefs, Evangelical Practices, Values that affect care:                 Additional Information:  Met with patient, she was alert and oriented x3, able to verbalize her thoughts and needs, speech is very soft but will speak up if asked and she is able to make her own decisions.    She reports that she came to the hospital because her friend said she couldn't walk. She complains of foot pain and numbness but she states she can still walk with walker. She reports that she lives alone. She states that she recently was fired from her job as a  because of her health. Asked if she was fired due to her chemical dependency problems and she said, no. She reports that she never drinks on the job. She states that her alcohol consumption has increased in the last 2 weeks because she lost her job.     She reports that she receives support from her brother and friends. She reports that she wants help for her alcohol treatment. Discussed a rule 25 and she has agree d to a rule 25 as she would like to go to inpatient treatment.    Order put in for a CD consult.    JAZMIN will follow along and will assist as needed.    Patsy Mahmood, [facility] JAZMIN    Inpatient Care Coordination   Supervisor  [facility]  [phone]    Patsy Mahmood, [facility] HospitalSW

## 2022-04-18 NOTE — PLAN OF CARE
"INPATIENT NOTE: [id number]     PRIMARY PROBLEM:Hypokalemia, Hypomagnesemia,  Abnormal electrocardiogram        Vital signs:  Temp: 97.8  F (36.6  C) Temp src: Oral BP: (!) 141/93 Pulse: 86   Resp: 18 SpO2: 95 % O2 Device: None (Room air) Oxygen Delivery: 2 LPM Height: 162.6 cm (5' 4\") Weight: 83.9 kg (185 lb)  Estimated body mass index is 31.76 kg/m  as calculated from the following:    Height as of this encounter: 1.626 m (5' 4\").    Weight as of this encounter: 83.9 kg (185 lb).        Orientation: Alert to, Self, Time and Place  Neuro: Intact   Pain status: denying pain.   Resp: Lung sounds are Clear. Denies any SOB.   Cardiac: WNL, Denies any Chest Pain   GI: Bowels are active x 4 Quads.   : Voiding with no concern    Skin: WNL   LDA: Peripheral IV   Infusions: Patient has Normal Saline 0.9% running at 100 mL per hour.   Pertinent Labs: K+ and Mag protocol.  Diet: Regular  Activity: are 1 Assist with Gait Belt and Walker  Consults:  sw  Shift Event: pt IV was replaced twice during the shift. Pt is on CIWA protocol. Pt is on Tele [facility] sinus. Pt needs a stool sample for occult blood stool. Pt is blood sugar checks with sliding scale.      Will continue to monitor and provide cares.     Ravi West RN    "

## 2022-04-18 NOTE — PROGRESS NOTES
"CLINICAL NUTRITION SERVICES  -  ASSESSMENT NOTE      RECOMMENDATIONS FOR MD/PROVIDER TO ORDER:   None     Recommendations Ordered by Registered Dietitian (RD):   Strawberry Glucerna for patient to try     Future/Additional Recommendations:   Add supplements pending PO intake/patient preference     Malnutrition:   Moderate in the context of social and environmental circumstances         REASON FOR ASSESSMENT  Jose Corral is a 51 year old female seen by Registered Dietitian for Provider Order - malnutrition assessment    Patient presents with severe alcohol use disorder, SI, anion gap metabolic acidosis, alcoholic ketosis, lactic acidosis, anemia, DM2, tobacco use disorder, sarcoidosis, alcohol induced pancreatitis.      NUTRITION HISTORY  - Information obtained from patient and chart  - Patient is on a Regular diet at home  - Typical food/fluid intake is on and off   - Diet history patient has been eating < 50% usual intake for the past 6 months  - Supplements gummies per patient and vitamin b12, folvite, multivitamin per PTA med list    NKFA      CURRENT NUTRITION ORDERS  Diet Order:     Regular   Safe Tray-with utensils    Current Intake/Tolerance:  Patient has ordered 2168kcals and 77g protein for past 3 meals.       NUTRITION FOCUSED PHYSICAL ASSESSMENT FOR DIAGNOSING MALNUTRITION)  Yes             Observed:    Muscle wasting (refer to documentation in Malnutrition section) and Subcutaneous fat loss (refer to documentation in Malnutrition section)    Obtained from Chart/Interdisciplinary Team:  Bruised arm    ANTHROPOMETRICS  Height: 5' 4\"  Weight: 185 lbs 0 oz  Body mass index is 31.76 kg/m .  Weight Status:  Obesity Grade I BMI 30-34.9  IBW: 54.7kg  % IBW: 153%  Weight History:   Wt Readings from Last 30 Encounters:   04/17/22 83.9 kg (185 lb)   03/26/22 84.5 kg (186 lb 4.8 oz)                 0.7% wt loss in 1 month   04/27/21 88.2 kg (194 lb 6.4 oz)                 4.9% wt loss in 1 year   10/16/20 " 93.4 kg (206 lb)     UBW: 260-270lbs stated per pt-question accuracy     LABS  Labs reviewed: BG , albm 3.1, ast 80, CRP 25.3, hgb 9.5, hematocrit 26.3, rbc 2.60    MEDICATIONS  Medications reviewed: folic acid, novolog, mag-ox, thiamine, lactated ringers       ASSESSED NUTRITION NEEDS PER APPROVED PRACTICE GUIDELINES:    Dosing Weight 83.9 kg (185 lb) current weight and 62kg ABW    Estimated Energy Needs: 2029 kcals (Murray St Jeor and stress factor 1.4)  Justification: maintenance and pancreatitis  Estimated Protein Needs: 93 grams protein (1.5 g pro/Kg) ABW  Justification: pancreatitis  Estimated Fluid Needs: 5838-5305  mL (30-35 mL/kg)  Justification: maintenance    MALNUTRITION:  % Weight Loss:  Weight loss does not meet criteria for malnutrition as it is not significant  % Intake:  Decreased intake does not meet criteria for malnutrition as patient is currently meeting estimated energy needs  Subcutaneous Fat Loss:  Upper arm region mild depletion  Muscle Loss:  Temporal region mild depletion, Clavicle bone region mild depletion, Dorsal hand region mild depletion and Patellar region mild depletion  Fluid Retention:  None noted    Malnutrition Diagnosis: Moderate malnutrition  In Context of:  Environmental or social circumstances    NUTRITION DIAGNOSIS:  Malnutrition related to severe alcohol use disorder as evidenced by mild subcutaneous fat loss and mild muscle loss      NUTRITION INTERVENTIONS  Recommendations / Nutrition Prescription  Strawberry Glucerna for patient to try  .      Implementation  Nutrition education: Per Provider order if indicated   Medical Food Supplement  .      Nutrition Goals  Meet estimated nutrition needs  No significant dry wt loss      MONITORING AND EVALUATION:  Progress towards goals will be monitored and evaluated per protocol and Practice Guidelines, Food intake, Liquid meal replacement or supplement, Vitamin intake, Weight, Biochemical data and Nutrition-focused  physical findings

## 2022-04-18 NOTE — PROGRESS NOTES
"[facility]    Hospitalist Progress Note    Date of Service (when I saw the patient): 04/18/2022  Provider:  Endy Chaudhary MD   Text Page  7am - 6PM       Assessment & Plan   Jose Corral is a 51 year old female with past medical history significant for sarcoidosis, type 2 diabetes mellitus, hypertension, and severe alcohol use disorder who was admitted on 4/16/2022 with acute alcohol intoxication, severe electrolyte disturbances and suicidal ideation.     Acute Alcohol Intoxication  Severe Alcohol Use Disorder  Blood ethanol 0.32 --> 0.29, though the patient denies alcohol use prior to having \"a couple of drinks this evening\".    Suspect chronic alcoholism despite her denial. Patient does not want alcohol use discussed with her brother, Emre Corral, who is listed as her emergency contact.  -CIWA  -Clonidine, Gabapentin, trigger-dose benzos  -Social work consult    Alcohol induced pancreatitis. Elevated Lipase  Lipase is peaking up, 5710 this AM -->2899. Denies abdominal pain.  Suspect chronic pancreatitis versus low-grade acute pancreatitis in the setting of significant alcohol use.  Recommend complete alcohol cessation.       Suicidal Ideation  Endorsed SI due to loss of job and other significant stressors in her life. Did explain a lose plan of using pain medications she received for a dental procedure. Patient placed on hold in emergency department. Ordering bedside attendant, suicide precautions and psychiatric evaluation on admission.  -Psychiatric consultation completed, no SI or plan, no intention to harm others.  -Suicide precautions discontinue     Anion Gap Metabolic Acidosis  Alcoholic Ketosis  Lactic Acidosis  Anion gap 23. Lactate 2.1, likely due to dehydration vs alcohol-induced. Ketones 6.7, suspect related to starvation vs alcohol use. Providing D5LR mIVF + LR boluses PRN. Recheck lactate and ketones 1 hour after bolus. Cannot rule out possible methanol ingestion " given severe alcohol use and suicidal idea, however creatinine is within normal limits. Obtaining blood cultures and urinalysis, though infection not likely in setting of afebrile, hemodynamic stability and normal WBC.  -mIVF: D5LR + bolus PRN  -BCx, UCx pending  -     High risk for Refeeding Syndrome  Severe hypokalemia (K 2.9 --> 2.7) and magnesemia (Mg 1.2) in setting of significant alcohol use. Checking phosphate. Replacing aggressively. Rechecking post replacement.  -Potassium + Magnesium replacement protocol  -Phosphorus     Prolonged QTc  Suspect related to electrolyte abnormalities vs alcohol use  Replacing electrolytes per protocol. Monitoring; Avoid QT prolonging medications.    Chronic macrocytic Anemia and mild thrombocytopenia: secondary to etoh. Hemoglobin 10.5; at baseline. Here 9.5. Fecal occult + with normal BUN. Hgb surveillance.  CBC daily.   Type 2 Diabetes Mellitus: Type 2 diabetes mellitus noted in her chart, however her A1c is 5.2 same as 1 year ago.    Hypertension: continue home med.  Sarcoidosis: Diagnosed in [place] 2010. Not currently following with provider. Pulmonology referral on discharge.  Tobacco Use Disorder: Patient did not want nicotine patches.  Diet:   Moderate Carb Consistent  Sanchez Catheter: Not present  Central Lines: None  Cardiac Monitoring: N    DVT Prophylaxis: Heparin SQ  Code Status: Full Code    Disposition: Expected Discharge: Likely 2-3 days pending clinical improvement and Lipase trajectory      Interval History   She is feeling better,uneventful night. Denies pain in her abdomen, no nausea or vomiting but lipase is higer.  She is tolerating orals.  Denies significant withdrawal syndrome in the past. Very mild tremors, scoring low.  She was seen by psychiatry and denied suicidal intentions or a plan    -Data reviewed today: I reviewed all new labs and imaging results over the last 24 hours.     Physical Exam   Temp: 98.5  F (36.9  C) Temp src: Oral BP: 133/87 Pulse:  86   Resp: 20 SpO2: 98 % O2 Device: None (Room air)    Vitals:    04/17/22 0551   Weight: 83.9 kg (185 lb)     Vital Signs with Ranges  Temp:  [97.8  F (36.6  C)-98.6  F (37  C)] 98.5  F (36.9  C)  Pulse:  [] 86  Resp:  [14-20] 20  BP: (133-160)/(80-96) 133/87  SpO2:  [93 %-98 %] 98 %  I/O last 3 completed shifts:  In: -   Out: 500 [Urine:500]    GEN:  Alert, oriented x 3, appears comfortable, NAD.  HEENT:  Normocephalic/atraumatic, no scleral icterus, no nasal discharge, mouth moist.  CV:  Regular rate and rhythm, no murmur or JVD.  S1 + S2 noted, no S3 or S4.  LUNGS:  Clear to auscultation bilaterally without rales/rhonchi/wheezing/retractions.  Symmetric chest rise on inhalation noted.  ABD:  Active bowel sounds, soft, non-tender/non-distended.  No rebound/guarding/rigidity.  EXT:  No edema or cyanosis.  No joint synovitis noted.  SKIN:  Dry to touch, no exanthems noted in the visualized areas.       Medications     dextrose 5% lactated ringers 125 mL/hr at 04/17/22 0445     lactated ringers 100 mL/hr at 04/17/22 2105       acetaminophen  975 mg Oral Q8H     cloNIDine  0.1 mg Oral Q8H     escitalopram  5 mg Oral Daily     folic acid  1 mg Intravenous Daily     [START ON 4/24/2022] gabapentin  100 mg Oral Q8H     [START ON 4/22/2022] gabapentin  300 mg Oral Q8H     [START ON 4/20/2022] gabapentin  600 mg Oral Q8H     gabapentin  900 mg Oral Q8H     heparin ANTICOAGULANT  5,000 Units Subcutaneous Q12H     insulin aspart  1-7 Units Subcutaneous TID AC     insulin aspart  1-5 Units Subcutaneous At Bedtime     magnesium oxide  400 mg Oral BID     polyethylene glycol  17 g Oral Daily     sodium chloride (PF)  3 mL Intracatheter Q8H     thiamine  500 mg Intravenous TID       Data   Recent Labs   Lab 04/18/22  0843 04/18/22  0752 04/18/22  0149 04/17/22  1735 04/17/22  1213 04/17/22  0547 04/17/22  0513 04/17/22  0121 04/17/22  0007   WBC 9.1  --   --   --   --   --   --   --  7.3   HGB 9.5*  --   --   --   --   --    --   --  10.5*   *  --   --   --   --   --   --   --  99   *  --   --   --   --   --   --   --  206     --   --   --   --   --   --   --  140   POTASSIUM 3.5  --   --   --  4.0  --  3.1*   < > 2.9*   CHLORIDE 96  --   --   --   --   --   --   --  94   CO2 31  --   --   --   --   --   --   --  23   BUN 14  --   --   --   --   --   --   --  10   CR 0.65  --   --   --   --   --   --   --  0.45*   ANIONGAP 6  --   --   --   --   --   --   --  23*   SABINO 9.1  --   --   --   --   --   --   --  8.5   * 168* 241*   < >  --    < >  --    < > 65*   ALBUMIN 3.1*  --   --   --   --   --   --   --  3.5   PROTTOTAL 7.1  --   --   --   --   --   --   --  7.8   BILITOTAL 1.0  --   --   --   --   --   --   --  0.6   ALKPHOS 91  --   --   --   --   --   --   --  101   ALT 31  --   --   --   --   --   --   --  45   AST 80*  --   --   --   --   --   --   --  167*   LIPASE 5,710*  --   --   --   --   --   --   --  2,899*    < > = values in this interval not displayed.       No results found for this or any previous visit (from the past 24 hour(s)).      Securely message with the Vocera Web Console (learn more here)  Text page via [employer] Paging/Directory        Disclaimer: This note consists of symbols derived from keyboarding, dictation and/or voice recognition software. As a result, there may be errors in the script that have gone undetected. Please consider this when interpreting information found in this chart.

## 2022-04-19 LAB
ALBUMIN SERPL-MCNC: 3 G/DL (ref 3.4–5)
ALP SERPL-CCNC: 82 U/L (ref 40–150)
ALT SERPL W P-5'-P-CCNC: 29 U/L (ref 0–50)
ANION GAP SERPL CALCULATED.3IONS-SCNC: 4 MMOL/L (ref 3–14)
AST SERPL W P-5'-P-CCNC: 53 U/L (ref 0–45)
ATRIAL RATE - MUSE: 81 BPM
ATRIAL RATE - MUSE: 96 BPM
BASOPHILS # BLD AUTO: 0 10E3/UL (ref 0–0.2)
BASOPHILS NFR BLD AUTO: 0 %
BILIRUB SERPL-MCNC: 0.5 MG/DL (ref 0.2–1.3)
BUN SERPL-MCNC: 12 MG/DL (ref 7–30)
CALCIUM SERPL-MCNC: 9.5 MG/DL (ref 8.5–10.1)
CHLORIDE BLD-SCNC: 99 MMOL/L (ref 94–109)
CO2 SERPL-SCNC: 29 MMOL/L (ref 20–32)
CREAT SERPL-MCNC: 0.55 MG/DL (ref 0.52–1.04)
DIASTOLIC BLOOD PRESSURE - MUSE: NORMAL MMHG
DIASTOLIC BLOOD PRESSURE - MUSE: NORMAL MMHG
EOSINOPHIL # BLD AUTO: 0.1 10E3/UL (ref 0–0.7)
EOSINOPHIL NFR BLD AUTO: 1 %
ERYTHROCYTE [DISTWIDTH] IN BLOOD BY AUTOMATED COUNT: 14.8 % (ref 10–15)
GFR SERPL CREATININE-BSD FRML MDRD: >90 ML/MIN/1.73M2
GLUCOSE BLD-MCNC: 118 MG/DL (ref 70–99)
GLUCOSE BLDC GLUCOMTR-MCNC: 114 MG/DL (ref 70–99)
GLUCOSE BLDC GLUCOMTR-MCNC: 125 MG/DL (ref 70–99)
GLUCOSE BLDC GLUCOMTR-MCNC: 133 MG/DL (ref 70–99)
GLUCOSE BLDC GLUCOMTR-MCNC: 148 MG/DL (ref 70–99)
HCT VFR BLD AUTO: 27.2 % (ref 35–47)
HGB BLD-MCNC: 9.6 G/DL (ref 11.7–15.7)
HGB BLD-MCNC: 9.8 G/DL (ref 11.7–15.7)
IMM GRANULOCYTES # BLD: 0 10E3/UL
IMM GRANULOCYTES NFR BLD: 1 %
INTERPRETATION ECG - MUSE: NORMAL
INTERPRETATION ECG - MUSE: NORMAL
LIPASE SERPL-CCNC: 4808 U/L (ref 73–393)
LYMPHOCYTES # BLD AUTO: 0.8 10E3/UL (ref 0.8–5.3)
LYMPHOCYTES NFR BLD AUTO: 14 %
MAGNESIUM SERPL-MCNC: 1.5 MG/DL (ref 1.6–2.3)
MCH RBC QN AUTO: 36.6 PG (ref 26.5–33)
MCHC RBC AUTO-ENTMCNC: 36 G/DL (ref 31.5–36.5)
MCV RBC AUTO: 102 FL (ref 78–100)
MONOCYTES # BLD AUTO: 0.5 10E3/UL (ref 0–1.3)
MONOCYTES NFR BLD AUTO: 9 %
NEUTROPHILS # BLD AUTO: 4.4 10E3/UL (ref 1.6–8.3)
NEUTROPHILS NFR BLD AUTO: 75 %
NRBC # BLD AUTO: 0 10E3/UL
NRBC BLD AUTO-RTO: 1 /100
P AXIS - MUSE: 33 DEGREES
P AXIS - MUSE: 50 DEGREES
PHOSPHATE SERPL-MCNC: 1.4 MG/DL (ref 2.5–4.5)
PLATELET # BLD AUTO: 140 10E3/UL (ref 150–450)
POTASSIUM BLD-SCNC: 4 MMOL/L (ref 3.4–5.3)
PR INTERVAL - MUSE: 152 MS
PR INTERVAL - MUSE: 158 MS
PROT SERPL-MCNC: 7 G/DL (ref 6.8–8.8)
QRS DURATION - MUSE: 100 MS
QRS DURATION - MUSE: 108 MS
QT - MUSE: 402 MS
QT - MUSE: 424 MS
QTC - MUSE: 466 MS
QTC - MUSE: 535 MS
R AXIS - MUSE: 12 DEGREES
R AXIS - MUSE: 42 DEGREES
RBC # BLD AUTO: 2.68 10E6/UL (ref 3.8–5.2)
SODIUM SERPL-SCNC: 132 MMOL/L (ref 133–144)
SYSTOLIC BLOOD PRESSURE - MUSE: NORMAL MMHG
SYSTOLIC BLOOD PRESSURE - MUSE: NORMAL MMHG
T AXIS - MUSE: 37 DEGREES
T AXIS - MUSE: 47 DEGREES
VENTRICULAR RATE- MUSE: 81 BPM
VENTRICULAR RATE- MUSE: 96 BPM
WBC # BLD AUTO: 5.9 10E3/UL (ref 4–11)

## 2022-04-19 PROCEDURE — 250N000013 HC RX MED GY IP 250 OP 250 PS 637: Performed by: HOSPITALIST

## 2022-04-19 PROCEDURE — 250N000011 HC RX IP 250 OP 636: Performed by: STUDENT IN AN ORGANIZED HEALTH CARE EDUCATION/TRAINING PROGRAM

## 2022-04-19 PROCEDURE — 84100 ASSAY OF PHOSPHORUS: CPT | Performed by: HOSPITALIST

## 2022-04-19 PROCEDURE — 80053 COMPREHEN METABOLIC PANEL: CPT | Performed by: HOSPITALIST

## 2022-04-19 PROCEDURE — 36415 COLL VENOUS BLD VENIPUNCTURE: CPT | Performed by: HOSPITALIST

## 2022-04-19 PROCEDURE — 250N000013 HC RX MED GY IP 250 OP 250 PS 637: Performed by: PSYCHIATRY & NEUROLOGY

## 2022-04-19 PROCEDURE — 250N000013 HC RX MED GY IP 250 OP 250 PS 637: Performed by: STUDENT IN AN ORGANIZED HEALTH CARE EDUCATION/TRAINING PROGRAM

## 2022-04-19 PROCEDURE — 85025 COMPLETE CBC W/AUTO DIFF WBC: CPT | Performed by: HOSPITALIST

## 2022-04-19 PROCEDURE — 120N000004 HC R&B MS OVERFLOW

## 2022-04-19 PROCEDURE — 83690 ASSAY OF LIPASE: CPT | Performed by: HOSPITALIST

## 2022-04-19 PROCEDURE — H0001 ALCOHOL AND/OR DRUG ASSESS: HCPCS

## 2022-04-19 PROCEDURE — 99233 SBSQ HOSP IP/OBS HIGH 50: CPT | Performed by: HOSPITALIST

## 2022-04-19 PROCEDURE — 258N000003 HC RX IP 258 OP 636: Performed by: STUDENT IN AN ORGANIZED HEALTH CARE EDUCATION/TRAINING PROGRAM

## 2022-04-19 PROCEDURE — 85018 HEMOGLOBIN: CPT | Performed by: HOSPITALIST

## 2022-04-19 PROCEDURE — 83735 ASSAY OF MAGNESIUM: CPT | Performed by: HOSPITALIST

## 2022-04-19 RX ORDER — MAGNESIUM OXIDE 400 MG/1
400 TABLET ORAL 3 TIMES DAILY
Status: DISCONTINUED | OUTPATIENT
Start: 2022-04-19 | End: 2022-04-20 | Stop reason: HOSPADM

## 2022-04-19 RX ORDER — MULTIVITAMIN,THERAPEUTIC
1 TABLET ORAL DAILY
Status: DISCONTINUED | OUTPATIENT
Start: 2022-04-19 | End: 2022-04-20 | Stop reason: HOSPADM

## 2022-04-19 RX ORDER — FOLIC ACID 1 MG/1
1 TABLET ORAL DAILY
Status: DISCONTINUED | OUTPATIENT
Start: 2022-04-20 | End: 2022-04-20 | Stop reason: HOSPADM

## 2022-04-19 RX ORDER — ACETAMINOPHEN 325 MG/1
975 TABLET ORAL EVERY 8 HOURS PRN
Status: DISCONTINUED | OUTPATIENT
Start: 2022-04-19 | End: 2022-04-20 | Stop reason: HOSPADM

## 2022-04-19 RX ORDER — LOSARTAN POTASSIUM 100 MG/1
100 TABLET ORAL DAILY
Status: DISCONTINUED | OUTPATIENT
Start: 2022-04-19 | End: 2022-04-20 | Stop reason: HOSPADM

## 2022-04-19 RX ADMIN — SODIUM CHLORIDE, POTASSIUM CHLORIDE, SODIUM LACTATE AND CALCIUM CHLORIDE: 600; 310; 30; 20 INJECTION, SOLUTION INTRAVENOUS at 03:49

## 2022-04-19 RX ADMIN — GABAPENTIN 900 MG: 300 CAPSULE ORAL at 17:15

## 2022-04-19 RX ADMIN — HEPARIN SODIUM 5000 UNITS: 5000 INJECTION INTRAVENOUS; SUBCUTANEOUS at 08:31

## 2022-04-19 RX ADMIN — THIAMINE HYDROCHLORIDE 500 MG: 100 INJECTION, SOLUTION INTRAMUSCULAR; INTRAVENOUS at 08:48

## 2022-04-19 RX ADMIN — GABAPENTIN 900 MG: 300 CAPSULE ORAL at 09:36

## 2022-04-19 RX ADMIN — LORAZEPAM 1 MG: 1 TABLET ORAL at 21:17

## 2022-04-19 RX ADMIN — POTASSIUM & SODIUM PHOSPHATES POWDER PACK 280-160-250 MG 2 PACKET: 280-160-250 PACK at 20:33

## 2022-04-19 RX ADMIN — ACETAMINOPHEN 975 MG: 325 TABLET, FILM COATED ORAL at 03:49

## 2022-04-19 RX ADMIN — Medication 5 MG: at 22:26

## 2022-04-19 RX ADMIN — HEPARIN SODIUM 5000 UNITS: 5000 INJECTION INTRAVENOUS; SUBCUTANEOUS at 20:33

## 2022-04-19 RX ADMIN — CLONIDINE HYDROCHLORIDE 0.1 MG: 0.1 TABLET ORAL at 03:49

## 2022-04-19 RX ADMIN — POTASSIUM & SODIUM PHOSPHATES POWDER PACK 280-160-250 MG 2 PACKET: 280-160-250 PACK at 08:31

## 2022-04-19 RX ADMIN — LORAZEPAM 1 MG: 1 TABLET ORAL at 23:21

## 2022-04-19 RX ADMIN — LORAZEPAM 1 MG: 1 TABLET ORAL at 22:25

## 2022-04-19 RX ADMIN — GABAPENTIN 900 MG: 300 CAPSULE ORAL at 02:40

## 2022-04-19 RX ADMIN — THERA TABS 1 TABLET: TAB at 09:37

## 2022-04-19 RX ADMIN — Medication 400 MG: at 21:17

## 2022-04-19 RX ADMIN — LOSARTAN POTASSIUM 100 MG: 100 TABLET, FILM COATED ORAL at 09:37

## 2022-04-19 RX ADMIN — ESCITALOPRAM 5 MG: 5 TABLET, FILM COATED ORAL at 08:31

## 2022-04-19 RX ADMIN — FOLIC ACID 1 MG: 5 INJECTION, SOLUTION INTRAMUSCULAR; INTRAVENOUS; SUBCUTANEOUS at 08:40

## 2022-04-19 ASSESSMENT — ACTIVITIES OF DAILY LIVING (ADL)
WEAR_GLASSES_OR_BLIND: NO
WALKING_OR_CLIMBING_STAIRS: AMBULATION DIFFICULTY, ASSISTANCE 1 PERSON
DOING_ERRANDS_INDEPENDENTLY_DIFFICULTY: YES
CONCENTRATING,_REMEMBERING_OR_MAKING_DECISIONS_DIFFICULTY: NO
WALKING_OR_CLIMBING_STAIRS_DIFFICULTY: YES
ADLS_ACUITY_SCORE: 16
ADLS_ACUITY_SCORE: 16
ADLS_ACUITY_SCORE: 10
ADLS_ACUITY_SCORE: 10
ADLS_ACUITY_SCORE: 12
ADLS_ACUITY_SCORE: 16
ADLS_ACUITY_SCORE: 10
ADLS_ACUITY_SCORE: 12
ADLS_ACUITY_SCORE: 16
ADLS_ACUITY_SCORE: 10
ADLS_ACUITY_SCORE: 16
ADLS_ACUITY_SCORE: 10
TRANSFERRING: 1-->ASSISTANCE (EQUIPMENT/PERSON) NEEDED
DRESS: 1-->ASSISTANCE (EQUIPMENT/PERSON) NEEDED
TOILETING_ISSUES: NO
DRESSING/BATHING_DIFFICULTY: YES
DRESSING/BATHING: BATHING DIFFICULTY, ASSISTANCE 1 PERSON
ADLS_ACUITY_SCORE: 10
ADLS_ACUITY_SCORE: 12
ADLS_ACUITY_SCORE: 12
ADLS_ACUITY_SCORE: 10
ADLS_ACUITY_SCORE: 12
DRESS: 0-->ASSISTANCE NEEDED (DEVELOPMENTALLY APPROPRIATE)
TRANSFERRING: 0-->ASSISTANCE NEEDED (DEVELOPMETNALLY APPROPRIATE)
ADLS_ACUITY_SCORE: 16
CHANGE_IN_FUNCTIONAL_STATUS_SINCE_ONSET_OF_CURRENT_ILLNESS/INJURY: YES
DIFFICULTY_EATING/SWALLOWING: NO
ADLS_ACUITY_SCORE: 10
ADLS_ACUITY_SCORE: 16
ADLS_ACUITY_SCORE: 16
ADLS_ACUITY_SCORE: 12
BATHING: 1-->ASSISTANCE NEEDED

## 2022-04-19 NOTE — PLAN OF CARE
"Shift 3080-1918    Patient alert and oriented x4. Patient up independently, right IV site saline locked. Patient denies pain at this time but states numbness and tingling in bilateral toes. Patient states her mind feels \"blurry.\" Patient appears restless at times, pacing the room. Patient confirms that she is not feeling suicidal at this moment. Telemetry monitor discontinued. Patient reports visual hallucinations such as seeing her dead mother in the room and live brother. Patient took a shower today, did not inform RN of this. Patient denies pain or any acute concerns at this time. Sodium is low at 132 as of 0706 today. Phosphorous recheck at 1500 was 1.4, next administration of phosphorous will be due at 2000. CIWA scores have been under 10, as of this shift. Blood sugars have been stable, gave 1 unit of insulin for lunchtime. Expected discharge date is 04/21/22.      "

## 2022-04-19 NOTE — CONSULTS
4/19/2022    Pt has been interviewed via [employer] and CD Consult has been completed. Email has been sent to unit  with [employer] for pt to sign. Referrals can be sent when pt's insurance is activated.    Recommendations:      1. Abstain from all non-prescribed mood-altering substances  2. Take all medications as prescribed  3. Enter and complete a residential or inpatient treatment program  4. Increase sober support, attend support meetings at least one time weekly        5.   Follow all recommendations of your medical providers        Clinical Substantiation:       Pt meets criteria for Alcohol use disorder-severe. Pt reports she has not attended treatment in the past. Pt reports she has attended sober support meetings but is not currently. Pt reports she would be interested in attending inpatient CD treatment.      Referrals/ Alternatives:     [facility]  [address], [place] [address]  Email Address: [email or web address]   Main number: [phone]   Fax number: [phone]     [facility]  Phone: [phone]  Fax: [phone] ATTN: [facility] Team for Referrals  Phone: [phone]  Fax: [phone]        SYD consult completed by: NATHAN Rogers  Phone Number: [phone]  E-mail Address: [email or web address] Evaluation Department  [address] [place] [address]
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
## 2022-04-19 NOTE — PROGRESS NOTES
Care Management Follow Up    Length of Stay (days): 2    Expected Discharge Date: 04/21/2022     Concerns to be Addressed: all concerns addressed in this encounter  concerns regarding her sobriety  Patient plan of care discussed at interdisciplinary rounds: Yes    Anticipated Discharge Disposition:       Anticipated Discharge Services: [facility]  Anticipated Discharge DME: None    Patient/family educated on Medicare website which has current facility and service quality ratings:    Education Provided on the Discharge Plan:    Patient/Family in Agreement with the Plan: yes    Referrals Placed by CM/SW: [facility]  Private pay costs discussed: Not applicable    Additional Information:  Pt signed JERSON for [facility] . Original copy placed in chart. Emailed copy to OBS     MOY Collins

## 2022-04-19 NOTE — PROGRESS NOTES
Care Management Follow Up    Length of Stay (days): 2    Expected Discharge Date: 04/21/2022     Concerns to be Addressed: Discharge planning  Patient plan of care discussed at interdisciplinary rounds: Yes    Anticipated Discharge Disposition:  Home     Anticipated Discharge Services: [facility]  Anticipated Discharge DME: None    Patient/family educated on Medicare website which has current facility and service quality ratings:  Yes  Patient/Family in Agreement with the Plan: yes    Referrals Placed by CM/SW: [facility]  Private pay costs discussed: Not applicable    Additional Information:  Patient was assessed by CD. Patient is interested in inpatient treatment. Patient was seen by financial counseling for no insurance. [place] application was completed but financial counseling anticipate that her insurance may take weeks to be processed.     Plan: Home with pt following up for IP CD Tx once she has insurance.     MOY Marcos

## 2022-04-19 NOTE — PROGRESS NOTES
[facility]    Medicine Progress Note - Hospitalist Service    Date of Admission:  4/16/2022    Assessment & Plan          Jose oCrral is a 51 year old female with past medical history significant for sarcoidosis, type 2 diabetes mellitus, hypertension, and severe alcohol use disorder who was admitted on 4/16/2022 with acute alcohol intoxication, severe electrolyte disturbances and suicidal ideation.    Alcohol intoxication/alcohol use disorder    - on CIWA, not requiring much ativan    - discontinue scheduled clonidine    - cont gabapentin    Electrolyte abnormalities/Refeeding syndrome    - had low K, phos and magnesium    - all replaced    Suicidal ideation/depression    - seen by Psych    - to be assessed by DEC today    - plan likely will be to discharge home with plans to go to inpatient treatment    Mild pancreatitis    - has elevated lipase    - symptoms mild    - eating    HTN    - stopped clonidine    - resumed losartan (previous med, patient had been non-compliant)    Chronic anemia    - at baseline    DM2    - listed, but HbA1c os 5.2    - had elevated BS, on ISS    Prolonged QT    - improved    Sarcoidosis    - diagnosed in [place] in 2020    - outpt follow-up    Offered to call family, patient declined.       Diet: Combination Diet Regular Diet; Safe Tray - with utensils    DVT Prophylaxis: Heparin SQ  Sanchez Catheter: Not present  Central Lines: None  Cardiac Monitoring: None  Code Status: Full Code      Disposition Plan   Expected Discharge: 04/21/2022     Anticipated discharge location: home    Delays:          The patient's care was discussed with the Bedside Nurse and Patient.    Austin Quiros MD  Hospitalist Service  [facility]  Securely message with the Vocera Web Console (learn more here)  Text page via [employer] Paging/Directory         Clinically Significant Risk Factors Present on Admission             # Obesity: Estimated body mass index is  "31.76 kg/m  as calculated from the following:    Height as of this encounter: 1.626 m (5' 4\").    Weight as of this encounter: 83.9 kg (185 lb).  # Moderate Malnutrition: based on nutrition assessment     ______________________________________________________________________    Interval History   Patient in bed. Calm. Ate breakfast. No chest pain, sob, abdo pain, n/v/d.     Data reviewed today: I reviewed all medications, new labs and imaging results over the last 24 hours. I personally reviewed the EKG tracing showing NSR.    Physical Exam   Vital Signs: Temp: 97.7  F (36.5  C) Temp src: Oral BP: (!) 132/94 Pulse: 77   Resp: 18 SpO2: 95 % O2 Device: None (Room air)    Weight: 185 lbs 0 oz  Constitutional: awake, alert, cooperative, no apparent distress, and appears stated age  Eyes: Lids and lashes normal, pupils equal, round and reactive to light, extra ocular muscles intact, sclera clear, conjunctiva normal  ENT: Normocephalic, without obvious abnormality, atraumatic, sinuses nontender on palpation, external ears without lesions, oral pharynx with moist mucous membranes, tonsils without erythema or exudates, gums normal and good dentition.  Respiratory: No increased work of breathing, good air exchange, clear to auscultation bilaterally, no crackles or wheezing  Cardiovascular: Normal apical impulse, regular rate and rhythm, normal S1 and S2, no S3 or S4, and no murmur noted  GI: No scars, normal bowel sounds, soft, non-distended, non-tender, no masses palpated, no hepatosplenomegally  Skin: no bruising or bleeding  Musculoskeletal: no lower extremity pitting edema present    Data   Recent Labs   Lab 04/19/22  0706 04/19/22  0312 04/19/22  0042 04/18/22  2146 04/18/22  1756 04/18/22  1147 04/18/22  0843 04/17/22  0121 04/17/22  0007   WBC 5.9  --   --   --   --   --  9.1  --  7.3   HGB 9.8*  --  9.6*  --  10.4*  --  9.5*  --  10.5*   *  --   --   --   --   --  101*  --  99   *  --   --   --   --   " --  136*  --  206   *  --   --   --   --   --  133  --  140   POTASSIUM 4.0  --   --   --  4.1  --  3.5   < > 2.9*   CHLORIDE 99  --   --   --   --   --  96  --  94   CO2 29  --   --   --   --   --  31  --  23   BUN 12  --   --   --   --   --  14  --  10   CR 0.55  --   --   --   --   --  0.65  --  0.45*   ANIONGAP 4  --   --   --   --   --  6  --  23*   SABINO 9.5  --   --   --   --   --  9.1  --  8.5   * 114*  --  148*  --    < > 186*   < > 65*   ALBUMIN 3.0*  --   --   --   --   --  3.1*  --  3.5   PROTTOTAL 7.0  --   --   --   --   --  7.1  --  7.8   BILITOTAL 0.5  --   --   --   --   --  1.0  --  0.6   ALKPHOS 82  --   --   --   --   --  91  --  101   ALT 29  --   --   --   --   --  31  --  45   AST 53*  --   --   --   --   --  80*  --  167*   LIPASE 4,808*  --   --   --   --   --  5,710*  --  2,899*    < > = values in this interval not displayed.     No results found for this or any previous visit (from the past 24 hour(s)).

## 2022-04-19 NOTE — PLAN OF CARE
Shift from [id number]     Inpatient Progress Note:  For complete assessment see flow sheet documentation.      Orientation: AO x3. Disoriented to situation, some auditory and visual hallucinations  Neuro: No numbness or tingling  Pain status: Denies pain  Activity: Up with SBA   Peripheral edema: NA  Resp: LS clear, denies dyspnea  Cardiac: WDL  GI: Continent  : Continent  Skin: pale, dry, scattered bruising  LDA: Left posterior wrist/hand  Infusions:  mL/hr  Pertinent Labs: , HGB 9.6  Diet: mod carb diet  Consults: Chemical dependency  Discharge Plan:

## 2022-04-20 VITALS
HEIGHT: 64 IN | HEART RATE: 85 BPM | DIASTOLIC BLOOD PRESSURE: 111 MMHG | TEMPERATURE: 97.6 F | RESPIRATION RATE: 18 BRPM | SYSTOLIC BLOOD PRESSURE: 147 MMHG | WEIGHT: 185 LBS | OXYGEN SATURATION: 97 % | BODY MASS INDEX: 31.58 KG/M2

## 2022-04-20 LAB
ANION GAP SERPL CALCULATED.3IONS-SCNC: 3 MMOL/L (ref 3–14)
BASOPHILS # BLD AUTO: 0 10E3/UL (ref 0–0.2)
BASOPHILS NFR BLD AUTO: 1 %
BUN SERPL-MCNC: 14 MG/DL (ref 7–30)
CALCIUM SERPL-MCNC: 9.4 MG/DL (ref 8.5–10.1)
CHLORIDE BLD-SCNC: 102 MMOL/L (ref 94–109)
CO2 SERPL-SCNC: 30 MMOL/L (ref 20–32)
CREAT SERPL-MCNC: 0.53 MG/DL (ref 0.52–1.04)
EOSINOPHIL # BLD AUTO: 0.1 10E3/UL (ref 0–0.7)
EOSINOPHIL NFR BLD AUTO: 2 %
ERYTHROCYTE [DISTWIDTH] IN BLOOD BY AUTOMATED COUNT: 15.4 % (ref 10–15)
GFR SERPL CREATININE-BSD FRML MDRD: >90 ML/MIN/1.73M2
GLUCOSE BLD-MCNC: 129 MG/DL (ref 70–99)
GLUCOSE BLDC GLUCOMTR-MCNC: 114 MG/DL (ref 70–99)
GLUCOSE BLDC GLUCOMTR-MCNC: 127 MG/DL (ref 70–99)
GLUCOSE BLDC GLUCOMTR-MCNC: 135 MG/DL (ref 70–99)
HCT VFR BLD AUTO: 27.5 % (ref 35–47)
HGB BLD-MCNC: 9.9 G/DL (ref 11.7–15.7)
IMM GRANULOCYTES # BLD: 0 10E3/UL
IMM GRANULOCYTES NFR BLD: 1 %
LYMPHOCYTES # BLD AUTO: 0.6 10E3/UL (ref 0.8–5.3)
LYMPHOCYTES NFR BLD AUTO: 13 %
MAGNESIUM SERPL-MCNC: 1.6 MG/DL (ref 1.6–2.3)
MCH RBC QN AUTO: 37.2 PG (ref 26.5–33)
MCHC RBC AUTO-ENTMCNC: 36 G/DL (ref 31.5–36.5)
MCV RBC AUTO: 103 FL (ref 78–100)
MONOCYTES # BLD AUTO: 0.6 10E3/UL (ref 0–1.3)
MONOCYTES NFR BLD AUTO: 14 %
NEUTROPHILS # BLD AUTO: 3 10E3/UL (ref 1.6–8.3)
NEUTROPHILS NFR BLD AUTO: 69 %
NRBC # BLD AUTO: 0 10E3/UL
NRBC BLD AUTO-RTO: 1 /100
PHOSPHATE SERPL-MCNC: 3.7 MG/DL (ref 2.5–4.5)
PLATELET # BLD AUTO: 145 10E3/UL (ref 150–450)
POTASSIUM BLD-SCNC: 4 MMOL/L (ref 3.4–5.3)
RBC # BLD AUTO: 2.66 10E6/UL (ref 3.8–5.2)
SODIUM SERPL-SCNC: 135 MMOL/L (ref 133–144)
WBC # BLD AUTO: 4.3 10E3/UL (ref 4–11)

## 2022-04-20 PROCEDURE — 85004 AUTOMATED DIFF WBC COUNT: CPT | Performed by: HOSPITALIST

## 2022-04-20 PROCEDURE — 83735 ASSAY OF MAGNESIUM: CPT | Performed by: HOSPITALIST

## 2022-04-20 PROCEDURE — 250N000013 HC RX MED GY IP 250 OP 250 PS 637: Performed by: PSYCHIATRY & NEUROLOGY

## 2022-04-20 PROCEDURE — 36415 COLL VENOUS BLD VENIPUNCTURE: CPT | Performed by: HOSPITALIST

## 2022-04-20 PROCEDURE — 250N000013 HC RX MED GY IP 250 OP 250 PS 637: Performed by: HOSPITALIST

## 2022-04-20 PROCEDURE — 99239 HOSP IP/OBS DSCHRG MGMT >30: CPT | Performed by: HOSPITALIST

## 2022-04-20 PROCEDURE — 84100 ASSAY OF PHOSPHORUS: CPT | Performed by: HOSPITALIST

## 2022-04-20 PROCEDURE — 80048 BASIC METABOLIC PNL TOTAL CA: CPT | Performed by: HOSPITALIST

## 2022-04-20 RX ORDER — ESCITALOPRAM OXALATE 5 MG/1
5 TABLET ORAL DAILY
Qty: 30 TABLET | Refills: 3 | Status: SHIPPED | OUTPATIENT
Start: 2022-04-21 | End: 2022-07-25

## 2022-04-20 RX ORDER — CARVEDILOL 25 MG/1
25 TABLET ORAL 2 TIMES DAILY WITH MEALS
Qty: 60 TABLET | Refills: 1 | Status: ON HOLD | OUTPATIENT
Start: 2022-04-20 | End: 2022-07-07

## 2022-04-20 RX ORDER — MAGNESIUM OXIDE 400 MG/1
400 TABLET ORAL DAILY
Qty: 30 TABLET | Refills: 3 | Status: SHIPPED | OUTPATIENT
Start: 2022-04-20

## 2022-04-20 RX ORDER — MAGNESIUM OXIDE 400 MG/1
400 TABLET ORAL 3 TIMES DAILY
Status: DISCONTINUED | OUTPATIENT
Start: 2022-04-20 | End: 2022-04-20

## 2022-04-20 RX ORDER — CARVEDILOL 25 MG/1
25 TABLET ORAL 2 TIMES DAILY WITH MEALS
Qty: 60 TABLET | Refills: 1 | Status: SHIPPED | OUTPATIENT
Start: 2022-04-20 | End: 2022-04-20

## 2022-04-20 RX ADMIN — Medication 400 MG: at 14:03

## 2022-04-20 RX ADMIN — ESCITALOPRAM 5 MG: 5 TABLET, FILM COATED ORAL at 09:03

## 2022-04-20 RX ADMIN — LOSARTAN POTASSIUM 100 MG: 100 TABLET, FILM COATED ORAL at 09:03

## 2022-04-20 RX ADMIN — FOLIC ACID 1 MG: 1 TABLET ORAL at 09:02

## 2022-04-20 RX ADMIN — POTASSIUM & SODIUM PHOSPHATES POWDER PACK 280-160-250 MG 2 PACKET: 280-160-250 PACK at 14:03

## 2022-04-20 RX ADMIN — THERA TABS 1 TABLET: TAB at 09:03

## 2022-04-20 RX ADMIN — Medication 50 MG: at 09:03

## 2022-04-20 RX ADMIN — Medication 400 MG: at 09:03

## 2022-04-20 RX ADMIN — POTASSIUM & SODIUM PHOSPHATES POWDER PACK 280-160-250 MG 2 PACKET: 280-160-250 PACK at 09:03

## 2022-04-20 ASSESSMENT — ACTIVITIES OF DAILY LIVING (ADL)
ADLS_ACUITY_SCORE: 10
ADLS_ACUITY_SCORE: 10
ADLS_ACUITY_SCORE: 11
ADLS_ACUITY_SCORE: 10
ADLS_ACUITY_SCORE: 11
ADLS_ACUITY_SCORE: 10
ADLS_ACUITY_SCORE: 11
ADLS_ACUITY_SCORE: 10
ADLS_ACUITY_SCORE: 10
ADLS_ACUITY_SCORE: 11
ADLS_ACUITY_SCORE: 11
ADLS_ACUITY_SCORE: 10
ADLS_ACUITY_SCORE: 10
ADLS_ACUITY_SCORE: 11

## 2022-04-20 NOTE — PROGRESS NOTES
Care Management Discharge Note    Discharge Date: 04/20/2022     Discharge Disposition:  Home    Discharge Services: Chemical Dependency Resources    Discharge DME: None    Discharge Transportation: Taxi    Private pay costs discussed: Not applicable    Patient/Family in Agreement with the Plan: yes    Handoff Referral Completed: No    Additional Information:  IP CD resources and info placed in AVS for pt to follow up after she has insurance.     JAZMIN placed call to pt's roommate Elmer, [phone]. He reports that he is almost done with work and will be home in about 20 minutes. He does not drive so he cannot pick patient up, but can buzz her in when she gets home.     JAZMIN updated RN. JAZMIN will call for taxi once patient is dressed and ready for discharge.     JAZMIN will continue to follow.     1600: JAZMIN called [employer], [phone], next available ride from main entrance.     MOY Marcos

## 2022-04-20 NOTE — PLAN OF CARE
Patient's After Visit Summary was reviewed with patient.  Patient verbalized understanding of After Visit Summary, recommended follow up and was given an opportunity to ask questions.   Discharge medications sent home with patient/family: YES   Discharged with transport tech via wheelchair transport to Patients apartment

## 2022-04-20 NOTE — PLAN OF CARE
PRIMARY DIAGNOSIS: Alcohol /AMS  OUTPATIENT/OBSERVATION GOALS TO BE MET BEFORE DISCHARGE:  ADLs back to baseline: No    Activity and level of assistance: Up with standby assistance.    Pain status: Pain free.    Return to near baseline physical activity: No     Discharge Planner Nurse   Safe discharge environment identified:  pending   Barriers to discharge:  pending        Entered by: Loy Hoffmann 04/20/2022 4:10 AM     Please review provider order for any additional goals.   Nurse to notify provider when observation goals have been met and patient is ready for discharge.Goal Outcome Evaluation:        Patient Ciwa score elevated this eris, patient hallucinating and attempting to leave due to confusion and thinking her ride was here.  Patient moderatley agitated and anxious see flow sheets ands MAR.  Patient was able to relax and rest with medications, VSS, deies pain, will cont to monitor

## 2022-04-20 NOTE — DISCHARGE INSTRUCTIONS
Inpatient Chemical Dependency referrals have been sent to:     [facility] ([phone])  [facility] ([phone])  [facility] ([phone])    Please follow up with these treatment facilities once your insurance is active.

## 2022-04-20 NOTE — PLAN OF CARE
Shift [id number]    Patient is very lethargic this shift. Patient is alert and oriented x3. Patient denies pain, numbness, or tingling at this time. Patient is on a regular diet. No IV access, patient states it fell off. Up independently in the room. Magnesium and phosphorous protocols initiated and maintained. The patient's magnesium level on 04/19 @ 2043 was 1.5. The patient's phosphorous level on 04/19 @ 1.4. RN has given the patient two doses of phosphorous and magnesium so far. Will follow RN managed electrolyte protocol. Magnesium and phosphorous checked, ranges for both are within normal limits. Patient has discharge orders but discharge will be difficult as the patient does not have her apartment keys.

## 2022-04-20 NOTE — DISCHARGE SUMMARY
"[facility]  Hospitalist Discharge Summary      Date of Admission:  4/16/2022  Date of Discharge:  4/20/2022  Discharging Provider: Austin Quiros MD  Discharge Service: Hospitalist Service    Discharge Diagnoses   Alcohol intoxication/use disorder, hypokalemia, hypophosphatemia, hypomagnesemia , depression, suicidal ideation, mild pancreatitis    Follow-ups Needed After Discharge   Follow-up Appointments     Follow-up and recommended labs and tests       Follow up with Chemical Dependency treatment center (the resources given   to you are listed elsewhere)             Unresulted Labs Ordered in the Past 30 Days of this Admission     Date and Time Order Name Status Description    4/17/2022  2:06 AM Blood Culture Arm, Left Preliminary     4/17/2022  1:56 AM Blood Culture Peripheral Blood Preliminary       These results will be followed up by hospitalist    Discharge Disposition   Discharged to home  Condition at discharge: Stable    Hospital Course   Jose Corral is a 51 year old female with past medical history significant for types 2 diabetes mellitus, hypertension, and alcohol use disorder who presented to [facility] on the evening of 4/16/2022 via EMS after friends called on her behalf.      Patient was reportedly drinking with friends earlier on the evening of 4/16/2022 when she had decreased level of conciousness. She reportedly fell and was not able to stand. EMS was called and patient was transported to emergency department.  Patient reported that she was feeling somewhat dizzy, but this was prior to any alcohol use.  She stated that she does not have money to drink alcohol at home, and did not have any alcohol prior to having \"a couple of drinks\" at her friends this evening.  She stated that she was short of breath, but that this is normal for her as she has asthma. Of note, she was recently admitted for community-acquired pneumonia but did not finish her " Azithromycin on discharge home.  She is having some heart palpitations.  She endorses some chronic numbness in bilateral lower extremities that is now extending upward over the past few weeks.     Upon arrival to the emergency department she was noted to be hemodynamically stable. She was slightly tachycardic with rates 105-114. She was afebrile. Laboratory studies revealed significant ethanol intoxication with serum levels of 0.32. Lipase was elevated to 2899, though patient denied abdominal pain. Her potassium was low at 2.9, downtrending to 2.7 after replacement initiated. Magnesium was 1.2. Metabolic acidosis with anion gap 23 was evident, with elevated ketones to 6.7 and lactic acid to 2.1. She received IVF and electrolyte replacement and was admitted for further cares.     Of note, during patient evaluation she stated that she did have SI with a lose plan. She was placed on a medical hold and admitted for further evaluation and care.  Alcohol intoxication/alcohol use disorder    - on CIWA, not requiring much ativan    - discontinue scheduled clonidine    - cont gabapentin     Electrolyte abnormalities/Refeeding syndrome    - had low K, phos and magnesium    - all replaced     Suicidal ideation/depression    - seen by Psych    - to be assessed by DEC today    - plan likely will be to discharge home with plans to go to inpatient treatment     Mild pancreatitis    - has elevated lipase    - symptoms mild    - eating     HTN    - stopped clonidine    - resumed losartan (previous med, patient had been non-compliant)     Chronic anemia    - at baseline     DM2    - listed, but HbA1c os 5.2    - had elevated BS, on ISS     Prolonged QT    - improved     Sarcoidosis    - diagnosed in [place] in 2020    - outpt follow-up  Patient last received Ativan last night.  Today she has been doing well. No hallucinations.    She is alert and oriented x3.  She has no complaints.  She is eating and drinking.  At this point she has  been seen by psychiatry as well as by DEC.  The  has educator her on what she will need to do to get to an inpatient psychiatric facility.  Apparently she lost her insurance and will need to wait until she has insurance and then she has been given the resources to call inpatient psych facilities.      Consultations This Hospital Stay   PSYCHIATRY IP CONSULT  NUTRITION SERVICES ADULT IP CONSULT  CARE MANAGEMENT / SOCIAL WORK IP CONSULT  CARE MANAGEMENT / SOCIAL WORK IP CONSULT  CHEMICAL DEPENDENCY IP CONSULT    Code Status   Full Code    Time Spent on this Encounter   I, Austin Quiros MD, personally saw the patient today and spent greater than 30 minutes discharging this patient.       Austin Quiros MD  [facility] OBSERVATION DEPT  [address] [facility] [address]  Phone: [phone]  ______________________________________________________________________    Physical Exam   Vital Signs: Temp: 97.1  F (36.2  C) Temp src: Axillary BP: (!) 143/91 Pulse: 63   Resp: 18 SpO2: 99 % O2 Device: None (Room air)    Weight: 185 lbs 0 oz  Constitutional: awake, alert, cooperative, no apparent distress, and appears stated age  Eyes: Lids and lashes normal, pupils equal, round and reactive to light, extra ocular muscles intact, sclera clear, conjunctiva normal  ENT: Normocephalic, without obvious abnormality, atraumatic, sinuses nontender on palpation, external ears without lesions, oral pharynx with moist mucous membranes, tonsils without erythema or exudates, gums normal and good dentition.  Respiratory: No increased work of breathing, good air exchange, clear to auscultation bilaterally, no crackles or wheezing  Cardiovascular: Normal apical impulse, regular rate and rhythm, normal S1 and S2, no S3 or S4, and no murmur noted  GI: No scars, normal bowel sounds, soft, non-distended, non-tender, no masses palpated, no hepatosplenomegally  Skin: no bruising or bleeding  Musculoskeletal:  no lower extremity pitting edema present       Primary Care Physician   Physician No Ref-Primary    Discharge Orders      Reason for your hospital stay    Alcohol withdrawal. Depression. Electrolyte abnormalities. Acute mild pancreatitis     Follow-up and recommended labs and tests     Follow up with Chemical Dependency treatment center (the resources given to you are listed elsewhere)     Activity    Your activity upon discharge: activity as tolerated     Diet    Follow this diet upon discharge: Orders Placed This Encounter     Regular diet       Significant Results and Procedures   Most Recent 3 CBC's:Recent Labs   Lab Test 04/20/22  0908 04/19/22  0706 04/19/22  0042 04/18/22  1756 04/18/22  0843   WBC 4.3 5.9  --   --  9.1   HGB 9.9* 9.8* 9.6*   < > 9.5*   * 102*  --   --  101*   * 140*  --   --  136*    < > = values in this interval not displayed.     Most Recent 3 BMP's:Recent Labs   Lab Test 04/20/22  1332 04/20/22  0908 04/20/22  0858 04/19/22  1237 04/19/22  0706 04/18/22  2146 04/18/22  1756 04/18/22  1147 04/18/22  0843   NA  --  135  --   --  132*  --   --   --  133   POTASSIUM  --  4.0  --   --  4.0  --  4.1  --  3.5   CHLORIDE  --  102  --   --  99  --   --   --  96   CO2  --  30  --   --  29  --   --   --  31   BUN  --  14  --   --  12  --   --   --  14   CR  --  0.53  --   --  0.55  --   --   --  0.65   ANIONGAP  --  3  --   --  4  --   --   --  6   SABINO  --  9.4  --   --  9.5  --   --   --  9.1   * 129* 135*   < > 118*   < >  --    < > 186*    < > = values in this interval not displayed.     Most Recent 2 LFT's:Recent Labs   Lab Test 04/19/22  0706 04/18/22  0843   AST 53* 80*   ALT 29 31   ALKPHOS 82 91   BILITOTAL 0.5 1.0   ,   Results for orders placed or performed during the hospital encounter of 04/16/22   XR Chest Port 1 View    Narrative    EXAM: XR CHEST PORT 1 VIEW  LOCATION: [facility]  DATE/TIME: 4/16/2022 11:50 PM    INDICATION:  Cough.  COMPARISON: 03/26/2022.      Impression    IMPRESSION: The chest is stable with again seen minimal prominence of the right perihilar lung markings superiorly and this could represent some minimal underlying infiltrate/atelectasis. No other acute cardiopulmonary abnormalities are suggested. There   are significant hypertrophic changes seen most prominent in the lower thoracic spine to left of midline.       Discharge Medications   Current Discharge Medication List      START taking these medications    Details   escitalopram (LEXAPRO) 5 MG tablet Take 1 tablet (5 mg) by mouth daily  Qty: 30 tablet, Refills: 3    Associated Diagnoses: Depression, unspecified depression type      magnesium oxide (MAG-OX) 400 MG tablet Take 1 tablet (400 mg) by mouth daily  Qty: 30 tablet, Refills: 3    Associated Diagnoses: Hypomagnesemia         CONTINUE these medications which have CHANGED    Details   carvedilol (COREG) 25 MG tablet Take 1 tablet (25 mg) by mouth 2 times daily (with meals)  Qty: 60 tablet, Refills: 1    Associated Diagnoses: Hypertensive urgency         CONTINUE these medications which have NOT CHANGED    Details   albuterol (PROAIR HFA/PROVENTIL HFA/VENTOLIN HFA) 108 (90 Base) MCG/ACT inhaler Inhale 2 puffs into the lungs every 6 hours  Qty: 18 g, Refills: 0    Comments: Pharmacy may dispense brand covered by insurance (Proair, or proventil or ventolin or generic albuterol inhaler)  Associated Diagnoses: Mild intermittent asthma, unspecified whether complicated      cyanocobalamin (VITAMIN B-12) 2500 MCG SUBL sublingual tablet Place 1 tablet (2,500 mcg) under the tongue daily  Qty: 30 tablet, Refills: 0    Associated Diagnoses: Alcohol use disorder, moderate, dependence (H)      fexofenadine (ALLEGRA ALLERGY) 180 MG tablet Take 1 tablet (180 mg) by mouth daily  Qty: 30 tablet, Refills: 0    Associated Diagnoses: Mild intermittent asthma, unspecified whether complicated      folic acid (FOLVITE) 1 MG tablet  Take 1 tablet (1 mg) by mouth daily  Qty: 30 tablet, Refills: 0    Associated Diagnoses: Alcohol use disorder, moderate, dependence (H)      gabapentin (NEURONTIN) 100 MG capsule Take 2 capsules (200 mg) by mouth 2 times daily  Qty: 120 capsule, Refills: 0    Associated Diagnoses: Alcohol use disorder, moderate, dependence (H)      losartan (COZAAR) 100 MG tablet Take 1 tablet (100 mg) by mouth daily  Qty: 30 tablet, Refills: 0    Associated Diagnoses: Hypertensive urgency      multivitamin w/minerals (THERA-VIT-M) tablet Take 1 tablet by mouth daily  Qty: 30 tablet, Refills: 0    Associated Diagnoses: Alcohol use disorder, moderate, dependence (H)           Allergies   No Known Allergies

## 2022-04-21 ENCOUNTER — PATIENT OUTREACH (OUTPATIENT)
Dept: CARE COORDINATION | Facility: CLINIC | Age: 52
End: 2022-04-21
Payer: MEDICAID

## 2022-04-21 DIAGNOSIS — Z71.89 OTHER SPECIFIED COUNSELING: ICD-10-CM

## 2022-04-21 NOTE — PROGRESS NOTES
Clinic Care Coordination Contact  [facility]/Voicemail    Clinical Data: Care Coordinator Outreach - TCM     Outreach attempted x 1.  Left message on patient's voicemail reminding pt to schedule hospital follow up appt with their PCP, and provided patient with [facility]'s 24/7 scheduling and nurse triage phone number [phone] ([phone]) for questions/concerns and/or to schedule an appt with an [facility] provider, if they do not have a PCP.     Plan:  Care Coordinator will try to reach patient again in 1-2 business days.      Roxanne Cates RN  [facility], [facility]

## 2022-04-22 LAB
BACTERIA BLD CULT: NO GROWTH
BACTERIA BLD CULT: NO GROWTH

## 2022-04-22 NOTE — PROGRESS NOTES
"Clinic Care Coordination Contact  [facility]: Post-Discharge Note  SITUATION                                                      Admission:    Admission Date: 04/16/22   Reason for Admission: Alcohol intoxication/use disorder, hypokalemia, hypophosphatemia, hypomagnesemia , depression, suicidal ideation, mild pancreatitis  Discharge:   Discharge Date: 04/20/22  Discharge Diagnosis: Alcohol intoxication/use disorder, hypokalemia, hypophosphatemia, hypomagnesemia , depression, suicidal ideation, mild pancreatitis    BACKGROUND                                                    Jose Corral is a 51 year old female with past medical history significant for types 2 diabetes mellitus, hypertension, and alcohol use disorder who presented to [facility] on the evening of 4/16/2022 via EMS after friends called on her behalf.      Patient was reportedly drinking with friends earlier on the evening of 4/16/2022 when she had decreased level of conciousness. She reportedly fell and was not able to stand. EMS was called and patient was transported to emergency department.  Patient reported that she was feeling somewhat dizzy, but this was prior to any alcohol use.  She stated that she does not have money to drink alcohol at home, and did not have any alcohol prior to having \"a couple of drinks\" at her friends this evening.  She stated that she was short of breath, but that this is normal for her as she has asthma. Of note, she was recently admitted for community-acquired pneumonia but did not finish her Azithromycin on discharge home.  She is having some heart palpitations.  She endorses some chronic numbness in bilateral lower extremities that is now extending upward over the past few weeks.     Upon arrival to the emergency department she was noted to be hemodynamically stable. She was slightly tachycardic with rates 105-114. She was afebrile. Laboratory studies revealed significant ethanol " intoxication with serum levels of 0.32. Lipase was elevated to 2899, though patient denied abdominal pain. Her potassium was low at 2.9, downtrending to 2.7 after replacement initiated. Magnesium was 1.2. Metabolic acidosis with anion gap 23 was evident, with elevated ketones to 6.7 and lactic acid to 2.1. She received IVF and electrolyte replacement and was admitted for further cares.     Of note, during patient evaluation she stated that she did have SI with a lose plan. She was placed on a medical hold and admitted for further evaluation and care.  Alcohol intoxication/alcohol use disorder    - on CIWA, not requiring much ativan    - discontinue scheduled clonidine    - cont gabapentin     Electrolyte abnormalities/Refeeding syndrome    - had low K, phos and magnesium    - all replaced     Suicidal ideation/depression    - seen by Psych    - to be assessed by DEC today    - plan likely will be to discharge home with plans to go to inpatient treatment     Mild pancreatitis    - has elevated lipase    - symptoms mild    - eating     HTN    - stopped clonidine    - resumed losartan (previous med, patient had been non-compliant)     Chronic anemia    - at baseline     DM2    - listed, but HbA1c os 5.2    - had elevated BS, on ISS     Prolonged QT    - improved     Sarcoidosis    - diagnosed in [place] in 2020    - outpt follow-up  Patient last received Ativan last night.  Today she has been doing well. No hallucinations.    She is alert and oriented x3.  She has no complaints.  She is eating and drinking.  At this point she has been seen by psychiatry as well as by DEC.  The  has educator her on what she will need to do to get to an inpatient psychiatric facility.  Apparently she lost her insurance and will need to wait until she has insurance and then she has been given the resources to call inpatient psych facilities.            ASSESSMENT      Enrollment  Primary Care Care Coordination Status: Unable to  Reach    Discharge Assessment  How are you doing now that you are home?: Patient shares that she is doing well, just trying to catch up on some sleep. Patient shares that once her insurace is active she will work on getting into treatment. Declines further needs at this time.  How are your symptoms? (Red Flag symptoms escalate to triage hotline per guidelines): Improved  Do you feel your condition is stable enough to be safe at home until your provider visit?: Yes  Does the patient have their discharge instructions? : Yes  Does the patient have questions regarding their discharge instructions? : No  Were you started on any new medications or were there changes to any of your previous medications? : Yes  Does the patient have all of their medications?: Yes  Do you have questions regarding any of your medications? : No  Do you have all of your needed medical supplies or equipment (DME)?  (i.e. oxygen tank, CPAP, cane, etc.): Yes  Discharge follow-up appointment scheduled within 14 calendar days? : No  Is patient agreeable to assistance with scheduling? : No (Waiting on insurance)    Post-op (CHW CTA Only)  If the patient had a surgery or procedure, do they have any questions for a nurse?: No    Post-op (Clinicians Only)  Did the patient have surgery or a procedure: No  Fever: No  Chills: No      PLAN                                                      Outpatient Plan:  Follow up with [facility] treatment center (the resources given to you are listed elsewhere)    No future appointments.      For any urgent concerns, please contact our 24 hour nurse triage line: [phone] ([phone])         NICKY Bauer

## 2022-05-09 NOTE — PROGRESS NOTES
"Patient is alert and oriented x 3. Patient is intermittently confused. On Mg, K and Phos replacement protocol. Patient received potassium and phosphorus replacement. Stool sample was sent, occult was positive. Hemoglobin recheck Q 6. EKG was completed this morning. Patient is stand by assist with walker and gait belt. CIWA scores have been 3 or below. Patient denies pain. Tolerating diet. IV is infusing. Will continue plan of care.    BP (!) 140/99 (BP Location: Left arm, Cuff Size: Adult Regular)   Pulse 77   Temp 98.4  F (36.9  C) (Oral)   Resp 17   Ht 1.626 m (5' 4\")   Wt 83.9 kg (185 lb)   SpO2 99%   BMI 31.76 kg/m      " .

## 2022-07-02 ENCOUNTER — HOSPITAL ENCOUNTER (EMERGENCY)
Facility: CLINIC | Age: 52
Discharge: HOME | End: 2022-07-03
Attending: EMERGENCY MEDICINE | Admitting: EMERGENCY MEDICINE
Payer: MEDICAID

## 2022-07-02 DIAGNOSIS — Z59.00 HOMELESSNESS: ICD-10-CM

## 2022-07-02 DIAGNOSIS — E87.6 HYPOKALEMIA: ICD-10-CM

## 2022-07-02 DIAGNOSIS — R45.851 SUICIDAL IDEATION: ICD-10-CM

## 2022-07-02 DIAGNOSIS — F10.929 ALCOHOLIC INTOXICATION WITH COMPLICATION (H): ICD-10-CM

## 2022-07-02 LAB
ANION GAP SERPL CALCULATED.3IONS-SCNC: 8 MMOL/L (ref 3–14)
BASOPHILS # BLD AUTO: 0 10E3/UL (ref 0–0.2)
BASOPHILS NFR BLD AUTO: 0 %
BUN SERPL-MCNC: 15 MG/DL (ref 7–30)
CALCIUM SERPL-MCNC: 8.6 MG/DL (ref 8.5–10.1)
CHLORIDE BLD-SCNC: 111 MMOL/L (ref 94–109)
CO2 SERPL-SCNC: 28 MMOL/L (ref 20–32)
CREAT SERPL-MCNC: 0.62 MG/DL (ref 0.52–1.04)
EOSINOPHIL # BLD AUTO: 0.1 10E3/UL (ref 0–0.7)
EOSINOPHIL NFR BLD AUTO: 2 %
ERYTHROCYTE [DISTWIDTH] IN BLOOD BY AUTOMATED COUNT: 17.5 % (ref 10–15)
GFR SERPL CREATININE-BSD FRML MDRD: >90 ML/MIN/1.73M2
GLUCOSE BLD-MCNC: 97 MG/DL (ref 70–99)
HCT VFR BLD AUTO: 32 % (ref 35–47)
HGB BLD-MCNC: 10.8 G/DL (ref 11.7–15.7)
IMM GRANULOCYTES # BLD: 0 10E3/UL
IMM GRANULOCYTES NFR BLD: 0 %
LYMPHOCYTES # BLD AUTO: 1.6 10E3/UL (ref 0.8–5.3)
LYMPHOCYTES NFR BLD AUTO: 34 %
MCH RBC QN AUTO: 35.9 PG (ref 26.5–33)
MCHC RBC AUTO-ENTMCNC: 33.8 G/DL (ref 31.5–36.5)
MCV RBC AUTO: 106 FL (ref 78–100)
MONOCYTES # BLD AUTO: 0.8 10E3/UL (ref 0–1.3)
MONOCYTES NFR BLD AUTO: 17 %
NEUTROPHILS # BLD AUTO: 2.2 10E3/UL (ref 1.6–8.3)
NEUTROPHILS NFR BLD AUTO: 47 %
NRBC # BLD AUTO: 0 10E3/UL
NRBC BLD AUTO-RTO: 0 /100
PLATELET # BLD AUTO: 320 10E3/UL (ref 150–450)
POTASSIUM BLD-SCNC: 3 MMOL/L (ref 3.4–5.3)
RBC # BLD AUTO: 3.01 10E6/UL (ref 3.8–5.2)
SODIUM SERPL-SCNC: 147 MMOL/L (ref 133–144)
WBC # BLD AUTO: 4.6 10E3/UL (ref 4–11)

## 2022-07-02 PROCEDURE — 36415 COLL VENOUS BLD VENIPUNCTURE: CPT | Performed by: EMERGENCY MEDICINE

## 2022-07-02 PROCEDURE — 250N000011 HC RX IP 250 OP 636: Performed by: EMERGENCY MEDICINE

## 2022-07-02 PROCEDURE — 85004 AUTOMATED DIFF WBC COUNT: CPT | Performed by: EMERGENCY MEDICINE

## 2022-07-02 PROCEDURE — 99285 EMERGENCY DEPT VISIT HI MDM: CPT | Mod: 25

## 2022-07-02 PROCEDURE — 80048 BASIC METABOLIC PNL TOTAL CA: CPT | Performed by: EMERGENCY MEDICINE

## 2022-07-02 PROCEDURE — 82077 ASSAY SPEC XCP UR&BREATH IA: CPT | Performed by: EMERGENCY MEDICINE

## 2022-07-02 PROCEDURE — 96374 THER/PROPH/DIAG INJ IV PUSH: CPT

## 2022-07-02 RX ORDER — HALOPERIDOL 5 MG/ML
2.5 INJECTION INTRAMUSCULAR ONCE
Status: COMPLETED | OUTPATIENT
Start: 2022-07-02 | End: 2022-07-02

## 2022-07-02 RX ADMIN — HALOPERIDOL LACTATE 2.5 MG: 5 INJECTION, SOLUTION INTRAMUSCULAR at 23:22

## 2022-07-02 SDOH — ECONOMIC STABILITY - HOUSING INSECURITY: HOMELESSNESS UNSPECIFIED: Z59.00

## 2022-07-03 VITALS
OXYGEN SATURATION: 94 % | TEMPERATURE: 98.3 F | RESPIRATION RATE: 18 BRPM | DIASTOLIC BLOOD PRESSURE: 97 MMHG | HEART RATE: 89 BPM | SYSTOLIC BLOOD PRESSURE: 184 MMHG

## 2022-07-03 LAB
ETHANOL SERPL-MCNC: 0.39 G/DL
HOLD SPECIMEN: NORMAL

## 2022-07-03 PROCEDURE — 90791 PSYCH DIAGNOSTIC EVALUATION: CPT
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
## 2022-07-03 NOTE — ED NOTES
Pt states she feels safe going to a friend's house in [place]. MD notified and okay with plan. Belongings given back to patient. Told pt she needs to call for a ride or we can give her bus tokens. Pt asked if we could get her a taxi, as that is what they did for her last time. RN explained we do not do that, and confirmed with David TYSON, charge nurse, who states there are no taxis in the area. Phone available for patient to call for ride. Hold discontinued by provider.

## 2022-07-03 NOTE — ED TRIAGE NOTES
Patient's brother called 911 tonight after pt was at his house drinking all day and threatening suicide by taking pills.       Triage Assessment     Row Name 07/02/22 2281       Triage Assessment (Adult)    Airway WDL WDL       Respiratory WDL    Respiratory WDL WDL

## 2022-07-03 NOTE — ED PROVIDER NOTES
"  History   Chief Complaint:  Alcohol Intoxication       The history is provided by the patient.      Jose Corral is a 51 year old female with history of type 2 diabetes, hypertension, obesity, suicidal ideation, depression, alcohol-induced acute pancreatitis, and asthma who presents with alcohol intoxication. The patient reports that she was intoxicated at 1800 and watching TV when EMS \"suddenly showed up.\" She states her brother called EMS because she said she was having suicidal thoughts. She reports she wanted to walk into traffic and that she had gathered pills to take in an attempt to commit suicide, but that a family friend had confiscated the pills and informed her brother. Magnesium was reportedly one of the pills she was going to use in the attempt. The patient reports she does not live with her brother and that she has been homeless for the past few days because she was evicted. She reports she \"feels suicidal every day.\" The patient denies use of street drugs. She states she does not have a therapist.     The patient repeatedly asks for restraints to be taken off.     Review of Systems   Psychiatric/Behavioral: Positive for suicidal ideas.   All other systems reviewed and are negative.        Allergies:  No Known Drug Allergies    Medications:  Coreg  Lexapro  Mag-Ox    Past Medical History:     Allergic rhinitis  Asthma  Carpal tunnel syndrome  Type 2 diabetes   DUB (Dysfunctional Uterine Bleeding)  Hypertension  Obesity  Sarcoidosis  Subdural hematoma  Trichomonal vaginitis  Palpitations  Suicidal ideation  Hyperintensive urgency  Depression  Chest pain   Hypokalemia  Hypomagnesemia  Alcohol-induced acute pancreatitis  Dyspnea     Past Surgical History:  Cholecystectomy    Family History:  Brother: heart disease, CABG  Father: diabetes, hypertension   Mother: diabetes, heart disease, hypertension     Social History:  The patient presents to the ED alone  Brother: Emre Corral- [phone] "     Physical Exam     Patient Vitals for the past 24 hrs:   BP Temp Temp src Pulse Resp SpO2   07/03/22 0500 -- -- -- -- -- 95 %   07/03/22 0430 -- -- -- -- -- 97 %   07/03/22 0400 -- -- -- -- -- 96 %   07/03/22 0300 -- -- -- -- -- 93 %   07/03/22 0200 -- -- -- -- -- 98 %   07/03/22 0130 -- -- -- -- -- 98 %   07/03/22 0100 116/78 -- -- 76 -- 99 %   07/03/22 0030 120/75 -- -- 81 -- 95 %   07/03/22 0000 117/75 -- -- 79 -- 95 %   07/02/22 2345 (!) 143/87 -- -- 83 -- 95 %   07/02/22 2330 (!) 139/94 -- -- 89 -- --   07/02/22 2315 (!) 160/106 -- -- 84 -- --   07/02/22 2300 (!) 135/109 -- -- 91 -- 96 %   07/02/22 2252 (!) 162/101 98.3  F (36.8  C) Oral 91 16 98 %     Physical Exam  Gen: Intoxicated, emotionally labile  HENT:  mmm, no rhinorrhea, atraumatic, pupils equal 4 mm bilaterally  Eyes: periorbital tissues and sclera normal   Neck: supple, no abnormal swelling  Lungs:  CTAB,  no resp distress  CV: rrr, no m/r/g, ppi  Abd: soft, nontender, nondistended, no rebound/masses/guarding/hsm  Ext: no peripheral edema  Skin: warm, dry, well perfused, no rashes/bruising/lesions on exposed skin  Neuro: alert, MAEE, no gross motor or sensory deficits, gait stable  Psych: Emotionally labile, ongoing suicidal ideation, no homicidal ideation      Emergency Department Course     Laboratory:  Labs Ordered and Resulted from Time of ED Arrival to Time of ED Departure   BASIC METABOLIC PANEL - Abnormal       Result Value    Sodium 147 (*)     Potassium 3.0 (*)     Chloride 111 (*)     Carbon Dioxide (CO2) 28      Anion Gap 8      Urea Nitrogen 15      Creatinine 0.62      Calcium 8.6      Glucose 97      GFR Estimate >90     ETHYL ALCOHOL LEVEL - Abnormal    Alcohol ethyl 0.39 (*)    CBC WITH PLATELETS AND DIFFERENTIAL - Abnormal    WBC Count 4.6      RBC Count 3.01 (*)     Hemoglobin 10.8 (*)     Hematocrit 32.0 (*)      (*)     MCH 35.9 (*)     MCHC 33.8      RDW 17.5 (*)     Platelet Count 320      % Neutrophils 47      %  Lymphocytes 34      % Monocytes 17      % Eosinophils 2      % Basophils 0      % Immature Granulocytes 0      NRBCs per 100 WBC 0      Absolute Neutrophils 2.2      Absolute Lymphocytes 1.6      Absolute Monocytes 0.8      Absolute Eosinophils 0.1      Absolute Basophils 0.0      Absolute Immature Granulocytes 0.0      Absolute NRBCs 0.0          Emergency Department Course:    Mental Health Risk Assessment      PSS-3    Date and Time Over the past 2 weeks have you felt down, depressed, or hopeless? Over the past 2 weeks have you had thoughts of killing yourself? Have you ever attempted to kill yourself? When did this last happen? User   22 yes yes yes more than 6 months ago [facility]      C-SSRS ([place])    Date and Time Q1 Wished to be Dead (Past Month) Q2 Suicidal Thoughts (Past Month) Q3 Suicidal Thought Method Q4 Suicidal Intent without Specific Plan Q5 Suicide Intent with Specific Plan Q6 Suicide Behavior (Lifetime) Within the Past 3 Months? RETIRED: Level of Risk per Screen Screening Not Complete User   22 yes yes yes no yes yes -- -- -- JPB              Suicide assessment completed by mental health (D.E.C., LCSW, etc.)    Reviewed:  I reviewed nursing notes, vitals, past medical history and Care Everywhere    Assessments:   I obtained history and examined the patient as noted above.    I rechecked the patient.   720    Consults:  DEC    Interventions:  Medications   haloperidol lactate (HALDOL) injection 2.5 mg (2.5 mg Intravenous Given 22 2322)     Disposition:  Signed out to the day team    Impression & Plan     Medical Decision Makin-year-old female here with alcohol intoxication and suicidal ideation.  Requiring restraints as she was combative with EMS.  Remained in restraints here and will slowly removed to ensure her as well as staff safety.  Given Haldol for some mild agitation.  Will need reassessment once sober a mental health assessment to determine the need  for inpatient mental health hospitalization versus this being more substance-induced suicidal ideation.      Slept most of the shift after the Haldol was administered.  Alcohol level markedly elevated.  Will be signed out to the day team pending sober reassessment and DEC evaluation to determine disposition.      Patient seen again around 0 720: Easily awakens feeling more clearheaded.  Continues to endorse that she has feelings of self-harm and is homeless.  Also mentions having some chronic numbness and tingling in her feet.  We will send her a primary care referral.  We will undergo a mental health assessment and disposition from there.    Critical Care Time: none    Diagnosis:    ICD-10-CM    1. Alcoholic intoxication with complication (H)  F10.929 Primary Care - Care Coordination Referral   2. Suicidal ideation  R45.851 Primary Care - Care Coordination Referral   3. Homelessness  Z59.00 Primary Care - Care Coordination Referral       Scribe Disclosure:  NELSON, Irais Mcclendon, am serving as a scribe at 11:04 PM on 7/2/2022 to document services personally performed by Abdoulaye Mason MD based on my observations and the provider's statements to me.            Abdoulaye Mason MD  07/03/22 3844

## 2022-07-03 NOTE — ED NOTES
Pt presented with ems in restraints due to the patient tring to hit crew.  At ed pt was pulling at restraints, yelling at staff, sitting up trying to get out of bed and leave.

## 2022-07-03 NOTE — ED NOTES
Spoke to patient's brother on the phone per patient's request. This Rn explained to brother that she is deemed safe for discharge by DEC  and MD. Pt denies suicidal ideation at this time. Pt working on finding a ride home.

## 2022-07-03 NOTE — ED PROVIDER NOTES
Patient seen by DEC, now denying suicidal ideation does admit to depression, unhappiness.  However, I do not feel she warrants inpatient stabilization, she did does contract for safety, but she does not want to die.    DEC worked to get patient potential crisis bed however after waiting to call for potential bed placement, patient decided she rather stay with her friend and was invited back.  She does contract for safety, agrees to return should she have homicidal suicidal thoughts, she was discharged.      Rahat Han MD  [employer]  8:26 AM 07/03/22        Rahat Han MD  07/03/22 1028       Rahat Han MD  07/03/22 1026

## 2022-07-06 ENCOUNTER — TELEPHONE (OUTPATIENT)
Dept: INTERNAL MEDICINE | Facility: CLINIC | Age: 52
End: 2022-07-06

## 2022-07-06 NOTE — TELEPHONE ENCOUNTER
Reason for call:  Symptom   Symptom or request: Patient experiencing leg swelling radiating to feet. Patient also having numbness in toes.      Have you been treated for this before? No    Additional comments: Patient has appointment scheduled in [place] tomorrow but is hoping for something in [place] closer to her home. Please call patient to advise. She does not drive so needs to arrange transportation as well.    Phone number to reach patient:  Cell number on file:    Telephone Information:   Mobile [phone]       Best Time:  any    Can we leave a detailed message on this number?  YES    Evelia Ventura CMA/ Central Scheduling

## 2022-07-07 ENCOUNTER — APPOINTMENT (OUTPATIENT)
Dept: GENERAL RADIOLOGY | Facility: CLINIC | Age: 52
End: 2022-07-07
Attending: EMERGENCY MEDICINE
Payer: MEDICAID

## 2022-07-07 ENCOUNTER — HOSPITAL ENCOUNTER (OUTPATIENT)
Facility: CLINIC | Age: 52
Setting detail: OBSERVATION
Discharge: HOME | End: 2022-07-08
Attending: EMERGENCY MEDICINE | Admitting: INTERNAL MEDICINE
Payer: MEDICAID

## 2022-07-07 ENCOUNTER — APPOINTMENT (OUTPATIENT)
Dept: CT IMAGING | Facility: CLINIC | Age: 52
End: 2022-07-07
Attending: EMERGENCY MEDICINE
Payer: MEDICAID

## 2022-07-07 ENCOUNTER — APPOINTMENT (OUTPATIENT)
Dept: ULTRASOUND IMAGING | Facility: CLINIC | Age: 52
End: 2022-07-07
Attending: EMERGENCY MEDICINE
Payer: MEDICAID

## 2022-07-07 DIAGNOSIS — I10 ESSENTIAL HYPERTENSION: ICD-10-CM

## 2022-07-07 DIAGNOSIS — I82.411 ACUTE DEEP VEIN THROMBOSIS (DVT) OF FEMORAL VEIN OF RIGHT LOWER EXTREMITY (H): ICD-10-CM

## 2022-07-07 DIAGNOSIS — R60.0 PERIPHERAL EDEMA: ICD-10-CM

## 2022-07-07 DIAGNOSIS — Z91.199 MEDICAL NON-COMPLIANCE: ICD-10-CM

## 2022-07-07 DIAGNOSIS — I16.0 HYPERTENSIVE URGENCY: Primary | ICD-10-CM

## 2022-07-07 LAB
ALBUMIN SERPL-MCNC: 3 G/DL (ref 3.4–5)
ALP SERPL-CCNC: 81 U/L (ref 40–150)
ALT SERPL W P-5'-P-CCNC: 11 U/L (ref 0–50)
ANION GAP SERPL CALCULATED.3IONS-SCNC: 7 MMOL/L (ref 3–14)
AST SERPL W P-5'-P-CCNC: 13 U/L (ref 0–45)
ATRIAL RATE - MUSE: 96 BPM
BASOPHILS # BLD AUTO: 0 10E3/UL (ref 0–0.2)
BASOPHILS NFR BLD AUTO: 0 %
BILIRUB SERPL-MCNC: 0.3 MG/DL (ref 0.2–1.3)
BUN SERPL-MCNC: 15 MG/DL (ref 7–30)
CALCIUM SERPL-MCNC: 9 MG/DL (ref 8.5–10.1)
CHLORIDE BLD-SCNC: 104 MMOL/L (ref 94–109)
CO2 SERPL-SCNC: 29 MMOL/L (ref 20–32)
CREAT SERPL-MCNC: 0.79 MG/DL (ref 0.52–1.04)
D DIMER PPP FEU-MCNC: 0.65 UG/ML FEU (ref 0–0.5)
DIASTOLIC BLOOD PRESSURE - MUSE: NORMAL MMHG
EOSINOPHIL # BLD AUTO: 0.1 10E3/UL (ref 0–0.7)
EOSINOPHIL NFR BLD AUTO: 1 %
ERYTHROCYTE [DISTWIDTH] IN BLOOD BY AUTOMATED COUNT: 16.8 % (ref 10–15)
GFR SERPL CREATININE-BSD FRML MDRD: >90 ML/MIN/1.73M2
GLUCOSE BLD-MCNC: 125 MG/DL (ref 70–99)
HCG SERPL QL: NEGATIVE
HCT VFR BLD AUTO: 33.8 % (ref 35–47)
HGB BLD-MCNC: 11.6 G/DL (ref 11.7–15.7)
IMM GRANULOCYTES # BLD: 0 10E3/UL
IMM GRANULOCYTES NFR BLD: 0 %
INTERPRETATION ECG - MUSE: NORMAL
LYMPHOCYTES # BLD AUTO: 0.7 10E3/UL (ref 0.8–5.3)
LYMPHOCYTES NFR BLD AUTO: 6 %
MAGNESIUM SERPL-MCNC: 1.6 MG/DL (ref 1.6–2.3)
MCH RBC QN AUTO: 35.4 PG (ref 26.5–33)
MCHC RBC AUTO-ENTMCNC: 34.3 G/DL (ref 31.5–36.5)
MCV RBC AUTO: 103 FL (ref 78–100)
MONOCYTES # BLD AUTO: 0.6 10E3/UL (ref 0–1.3)
MONOCYTES NFR BLD AUTO: 6 %
NEUTROPHILS # BLD AUTO: 8.8 10E3/UL (ref 1.6–8.3)
NEUTROPHILS NFR BLD AUTO: 87 %
NRBC # BLD AUTO: 0 10E3/UL
NRBC BLD AUTO-RTO: 0 /100
NT-PROBNP SERPL-MCNC: 583 PG/ML (ref 0–900)
P AXIS - MUSE: 41 DEGREES
PLATELET # BLD AUTO: 319 10E3/UL (ref 150–450)
POTASSIUM BLD-SCNC: 2.9 MMOL/L (ref 3.4–5.3)
POTASSIUM BLD-SCNC: 3.1 MMOL/L (ref 3.4–5.3)
PR INTERVAL - MUSE: 140 MS
PROT SERPL-MCNC: 7.6 G/DL (ref 6.8–8.8)
QRS DURATION - MUSE: 82 MS
QT - MUSE: 428 MS
QTC - MUSE: 540 MS
R AXIS - MUSE: 16 DEGREES
RBC # BLD AUTO: 3.28 10E6/UL (ref 3.8–5.2)
SARS-COV-2 RNA RESP QL NAA+PROBE: POSITIVE
SODIUM SERPL-SCNC: 140 MMOL/L (ref 133–144)
SYSTOLIC BLOOD PRESSURE - MUSE: NORMAL MMHG
T AXIS - MUSE: 25 DEGREES
TROPONIN I SERPL HS-MCNC: 9 NG/L
VENTRICULAR RATE- MUSE: 96 BPM
WBC # BLD AUTO: 10.2 10E3/UL (ref 4–11)

## 2022-07-07 PROCEDURE — 85379 FIBRIN DEGRADATION QUANT: CPT | Performed by: EMERGENCY MEDICINE

## 2022-07-07 PROCEDURE — 84703 CHORIONIC GONADOTROPIN ASSAY: CPT | Performed by: EMERGENCY MEDICINE

## 2022-07-07 PROCEDURE — 84132 ASSAY OF SERUM POTASSIUM: CPT | Performed by: INTERNAL MEDICINE

## 2022-07-07 PROCEDURE — 93005 ELECTROCARDIOGRAM TRACING: CPT

## 2022-07-07 PROCEDURE — 36415 COLL VENOUS BLD VENIPUNCTURE: CPT | Performed by: INTERNAL MEDICINE

## 2022-07-07 PROCEDURE — C9803 HOPD COVID-19 SPEC COLLECT: HCPCS

## 2022-07-07 PROCEDURE — 96365 THER/PROPH/DIAG IV INF INIT: CPT | Mod: 59

## 2022-07-07 PROCEDURE — U0003 INFECTIOUS AGENT DETECTION BY NUCLEIC ACID (DNA OR RNA); SEVERE ACUTE RESPIRATORY SYNDROME CORONAVIRUS 2 (SARS-COV-2) (CORONAVIRUS DISEASE [COVID-19]), AMPLIFIED PROBE TECHNIQUE, MAKING USE OF HIGH THROUGHPUT TECHNOLOGIES AS DESCRIBED BY CMS-2020-01-R: HCPCS | Performed by: EMERGENCY MEDICINE

## 2022-07-07 PROCEDURE — 250N000011 HC RX IP 250 OP 636: Performed by: EMERGENCY MEDICINE

## 2022-07-07 PROCEDURE — 99285 EMERGENCY DEPT VISIT HI MDM: CPT | Mod: CS,25

## 2022-07-07 PROCEDURE — 999N000156 HC STATISTIC RCP CONSULT EA 30 MIN

## 2022-07-07 PROCEDURE — 80053 COMPREHEN METABOLIC PANEL: CPT | Performed by: EMERGENCY MEDICINE

## 2022-07-07 PROCEDURE — 84484 ASSAY OF TROPONIN QUANT: CPT | Performed by: EMERGENCY MEDICINE

## 2022-07-07 PROCEDURE — 71275 CT ANGIOGRAPHY CHEST: CPT

## 2022-07-07 PROCEDURE — 36415 COLL VENOUS BLD VENIPUNCTURE: CPT | Performed by: EMERGENCY MEDICINE

## 2022-07-07 PROCEDURE — G0378 HOSPITAL OBSERVATION PER HR: HCPCS

## 2022-07-07 PROCEDURE — 83880 ASSAY OF NATRIURETIC PEPTIDE: CPT | Performed by: EMERGENCY MEDICINE

## 2022-07-07 PROCEDURE — 85014 HEMATOCRIT: CPT | Performed by: EMERGENCY MEDICINE

## 2022-07-07 PROCEDURE — 73562 X-RAY EXAM OF KNEE 3: CPT | Mod: RT

## 2022-07-07 PROCEDURE — 99220 PR INITIAL OBSERVATION CARE,LEVEL III: CPT | Performed by: INTERNAL MEDICINE

## 2022-07-07 PROCEDURE — 83735 ASSAY OF MAGNESIUM: CPT | Performed by: INTERNAL MEDICINE

## 2022-07-07 PROCEDURE — 250N000013 HC RX MED GY IP 250 OP 250 PS 637: Performed by: EMERGENCY MEDICINE

## 2022-07-07 PROCEDURE — 71046 X-RAY EXAM CHEST 2 VIEWS: CPT

## 2022-07-07 PROCEDURE — 96366 THER/PROPH/DIAG IV INF ADDON: CPT

## 2022-07-07 PROCEDURE — 96375 TX/PRO/DX INJ NEW DRUG ADDON: CPT

## 2022-07-07 PROCEDURE — 250N000009 HC RX 250: Performed by: EMERGENCY MEDICINE

## 2022-07-07 PROCEDURE — 250N000013 HC RX MED GY IP 250 OP 250 PS 637: Performed by: INTERNAL MEDICINE

## 2022-07-07 PROCEDURE — 93970 EXTREMITY STUDY: CPT

## 2022-07-07 RX ORDER — PROCHLORPERAZINE 25 MG
25 SUPPOSITORY, RECTAL RECTAL EVERY 12 HOURS PRN
Status: DISCONTINUED | OUTPATIENT
Start: 2022-07-07 | End: 2022-07-08 | Stop reason: HOSPADM

## 2022-07-07 RX ORDER — ONDANSETRON 4 MG/1
4 TABLET, ORALLY DISINTEGRATING ORAL EVERY 6 HOURS PRN
Status: DISCONTINUED | OUTPATIENT
Start: 2022-07-07 | End: 2022-07-08 | Stop reason: HOSPADM

## 2022-07-07 RX ORDER — AMOXICILLIN 250 MG
2 CAPSULE ORAL 2 TIMES DAILY PRN
Status: DISCONTINUED | OUTPATIENT
Start: 2022-07-07 | End: 2022-07-08 | Stop reason: HOSPADM

## 2022-07-07 RX ORDER — NALOXONE HYDROCHLORIDE 0.4 MG/ML
0.2 INJECTION, SOLUTION INTRAMUSCULAR; INTRAVENOUS; SUBCUTANEOUS
Status: DISCONTINUED | OUTPATIENT
Start: 2022-07-07 | End: 2022-07-08 | Stop reason: HOSPADM

## 2022-07-07 RX ORDER — HYDRALAZINE HYDROCHLORIDE 20 MG/ML
10 INJECTION INTRAMUSCULAR; INTRAVENOUS EVERY 4 HOURS PRN
Status: DISCONTINUED | OUTPATIENT
Start: 2022-07-07 | End: 2022-07-08 | Stop reason: HOSPADM

## 2022-07-07 RX ORDER — POTASSIUM CHLORIDE 1500 MG/1
40 TABLET, EXTENDED RELEASE ORAL ONCE
Status: COMPLETED | OUTPATIENT
Start: 2022-07-07 | End: 2022-07-07

## 2022-07-07 RX ORDER — FUROSEMIDE 10 MG/ML
40 INJECTION INTRAMUSCULAR; INTRAVENOUS ONCE
Status: COMPLETED | OUTPATIENT
Start: 2022-07-07 | End: 2022-07-07

## 2022-07-07 RX ORDER — NALOXONE HYDROCHLORIDE 0.4 MG/ML
0.4 INJECTION, SOLUTION INTRAMUSCULAR; INTRAVENOUS; SUBCUTANEOUS
Status: DISCONTINUED | OUTPATIENT
Start: 2022-07-07 | End: 2022-07-08 | Stop reason: HOSPADM

## 2022-07-07 RX ORDER — LOSARTAN POTASSIUM 100 MG/1
100 TABLET ORAL ONCE
Status: COMPLETED | OUTPATIENT
Start: 2022-07-07 | End: 2022-07-07

## 2022-07-07 RX ORDER — ONDANSETRON 2 MG/ML
4 INJECTION INTRAMUSCULAR; INTRAVENOUS EVERY 6 HOURS PRN
Status: DISCONTINUED | OUTPATIENT
Start: 2022-07-07 | End: 2022-07-08 | Stop reason: HOSPADM

## 2022-07-07 RX ORDER — HYDROCODONE BITARTRATE AND ACETAMINOPHEN 5; 325 MG/1; MG/1
1 TABLET ORAL ONCE
Status: COMPLETED | OUTPATIENT
Start: 2022-07-07 | End: 2022-07-07

## 2022-07-07 RX ORDER — LOSARTAN POTASSIUM 100 MG/1
100 TABLET ORAL DAILY
Status: DISCONTINUED | OUTPATIENT
Start: 2022-07-08 | End: 2022-07-08 | Stop reason: HOSPADM

## 2022-07-07 RX ORDER — ALBUTEROL SULFATE 90 UG/1
2 AEROSOL, METERED RESPIRATORY (INHALATION) EVERY 6 HOURS PRN
Status: DISCONTINUED | OUTPATIENT
Start: 2022-07-07 | End: 2022-07-08 | Stop reason: HOSPADM

## 2022-07-07 RX ORDER — CARVEDILOL 25 MG/1
25 TABLET ORAL 2 TIMES DAILY WITH MEALS
Status: ON HOLD | COMMUNITY
End: 2022-07-08

## 2022-07-07 RX ORDER — PROCHLORPERAZINE MALEATE 5 MG
10 TABLET ORAL EVERY 6 HOURS PRN
Status: DISCONTINUED | OUTPATIENT
Start: 2022-07-07 | End: 2022-07-08 | Stop reason: HOSPADM

## 2022-07-07 RX ORDER — AMOXICILLIN 250 MG
1 CAPSULE ORAL 2 TIMES DAILY PRN
Status: DISCONTINUED | OUTPATIENT
Start: 2022-07-07 | End: 2022-07-08 | Stop reason: HOSPADM

## 2022-07-07 RX ORDER — CARVEDILOL 6.25 MG/1
6.25 TABLET ORAL 2 TIMES DAILY WITH MEALS
Status: DISCONTINUED | OUTPATIENT
Start: 2022-07-07 | End: 2022-07-08

## 2022-07-07 RX ORDER — POLYETHYLENE GLYCOL 3350 17 G/17G
17 POWDER, FOR SOLUTION ORAL DAILY PRN
Status: DISCONTINUED | OUTPATIENT
Start: 2022-07-07 | End: 2022-07-08 | Stop reason: HOSPADM

## 2022-07-07 RX ORDER — ACETAMINOPHEN 325 MG/1
975 TABLET ORAL EVERY 8 HOURS
Status: DISCONTINUED | OUTPATIENT
Start: 2022-07-07 | End: 2022-07-08 | Stop reason: HOSPADM

## 2022-07-07 RX ORDER — HYDROMORPHONE HCL IN WATER/PF 6 MG/30 ML
0.2 PATIENT CONTROLLED ANALGESIA SYRINGE INTRAVENOUS
Status: DISCONTINUED | OUTPATIENT
Start: 2022-07-07 | End: 2022-07-08 | Stop reason: HOSPADM

## 2022-07-07 RX ORDER — POTASSIUM CHLORIDE 7.45 MG/ML
10 INJECTION INTRAVENOUS ONCE
Status: COMPLETED | OUTPATIENT
Start: 2022-07-07 | End: 2022-07-07

## 2022-07-07 RX ORDER — IOPAMIDOL 755 MG/ML
500 INJECTION, SOLUTION INTRAVASCULAR ONCE
Status: COMPLETED | OUTPATIENT
Start: 2022-07-07 | End: 2022-07-07

## 2022-07-07 RX ORDER — MAGNESIUM OXIDE 400 MG/1
400 TABLET ORAL DAILY
Status: DISCONTINUED | OUTPATIENT
Start: 2022-07-07 | End: 2022-07-08 | Stop reason: HOSPADM

## 2022-07-07 RX ORDER — OXYCODONE HYDROCHLORIDE 5 MG/1
5 TABLET ORAL EVERY 4 HOURS PRN
Status: DISCONTINUED | OUTPATIENT
Start: 2022-07-07 | End: 2022-07-08 | Stop reason: HOSPADM

## 2022-07-07 RX ORDER — ALBUTEROL SULFATE 90 UG/1
2 AEROSOL, METERED RESPIRATORY (INHALATION) EVERY 6 HOURS
Status: DISCONTINUED | OUTPATIENT
Start: 2022-07-07 | End: 2022-07-07

## 2022-07-07 RX ORDER — POTASSIUM CHLORIDE 1.5 G/1.58G
40 POWDER, FOR SOLUTION ORAL ONCE
Status: COMPLETED | OUTPATIENT
Start: 2022-07-07 | End: 2022-07-07

## 2022-07-07 RX ADMIN — LOSARTAN POTASSIUM 100 MG: 100 TABLET, FILM COATED ORAL at 16:00

## 2022-07-07 RX ADMIN — ACETAMINOPHEN 975 MG: 325 TABLET, FILM COATED ORAL at 18:54

## 2022-07-07 RX ADMIN — Medication 400 MG: at 18:54

## 2022-07-07 RX ADMIN — RIVAROXABAN 15 MG: 15 TABLET, FILM COATED ORAL at 16:00

## 2022-07-07 RX ADMIN — SODIUM CHLORIDE 85 ML: 9 INJECTION, SOLUTION INTRAVENOUS at 14:36

## 2022-07-07 RX ADMIN — HYDROCODONE BITARTRATE AND ACETAMINOPHEN 1 TABLET: 5; 325 TABLET ORAL at 14:47

## 2022-07-07 RX ADMIN — CARVEDILOL 6.25 MG: 6.25 TABLET, FILM COATED ORAL at 19:13

## 2022-07-07 RX ADMIN — IOPAMIDOL 65 ML: 755 INJECTION, SOLUTION INTRAVENOUS at 14:36

## 2022-07-07 RX ADMIN — OXYCODONE HYDROCHLORIDE 5 MG: 5 TABLET ORAL at 18:54

## 2022-07-07 RX ADMIN — FUROSEMIDE 40 MG: 10 INJECTION, SOLUTION INTRAMUSCULAR; INTRAVENOUS at 16:00

## 2022-07-07 RX ADMIN — POTASSIUM CHLORIDE 10 MEQ: 7.46 INJECTION, SOLUTION INTRAVENOUS at 16:00

## 2022-07-07 RX ADMIN — POTASSIUM CHLORIDE 40 MEQ: 1500 TABLET, EXTENDED RELEASE ORAL at 22:38

## 2022-07-07 RX ADMIN — POTASSIUM CHLORIDE 40 MEQ: 1.5 POWDER, FOR SOLUTION ORAL at 12:11

## 2022-07-07 ASSESSMENT — ENCOUNTER SYMPTOMS
SHORTNESS OF BREATH: 0
FEVER: 0
COUGH: 0
ARTHRALGIAS: 1
JOINT SWELLING: 1

## 2022-07-07 NOTE — ED NOTES
Bed: ED02  Expected date: 7/7/22  Expected time: 10:08 AM  Means of arrival: Ambulance  Comments:  BV2- 51y, F, swelling bilateral lower ext x 2 weeks, HTN c/o pain

## 2022-07-07 NOTE — H&P
[facility]    History and Physical - Hospitalist Service       Date of Admission:  7/7/2022    Assessment & Plan      Jose Corral is a 51 year old female with history of type 2 diabetes, hypertension, depression, suicidal ideation, alcohol abuse, asthma, B12 deficiency, sarcoidosis, obesity, and allergic rhinitis.  She was hospitalized here from 4/16/2022 through 4/20/2022 with alcohol intoxication, hypokalemia, hypophosphatemia, hypomagnesemia, mild pancreatitis, depression, and suicidal ideation.  During that admission she was prescribed Lexapro and magnesium oxide.  Her prior to admission Coreg dose was increased to 25 mg twice daily on discharge.  She was also thought to be on losartan, Neurontin, folic acid, multivitamin, and an albuterol inhaler at that time.  It is not clear that she was.  It does seem that after that admission she only took the Lexapro, magnesium oxide, and Coreg.  She recently moved out of her home and has been staying with different friends.  This was about 3 or 4 weeks ago.  During that move she misplaced all of her medications except her magnesium oxide.  So for the last 3 to 4 weeks she has only been taking magnesium oxide.  Prior to that it is not clear exactly what she was taking but it sounds like just magnesium oxide, Lexapro, and Coreg.  She presented to the [facility] emergency department today (7/7/2022) for evaluation of leg swelling.  Both legs were swollen but the right was worse.  She was having a lot of pain in her right leg that was limiting ambulation.  Emergency department evaluation showed elevated blood pressure of 190s to 200s over 90s to 130s.  Laboratory evaluation showed potassium of 2.9 with otherwise unremarkable comprehensive metabolic panel.  Hemoglobin was 11.6 with otherwise unremarkable CBC.  COVID-19 testing was positive.  D-dimer was elevated at 0.65.  Chest x-ray is unremarkable.  CT of chest with PE protocol  showed no PE.  It did suggest possible bronchitis.  Bilateral lower extremity ultrasound showed right partially occlusive DVT in the mid to distal femoral vein.  She was given Xarelto for DVT.  She was given Lasix 40 mg IV for edema.  I was asked admit her to observation for pain control and for blood pressure control.    Problem list:    Right lower extremity DVT  -Continue Xarelto 15 mg twice daily for 21 days then 20 mg by mouth daily.  3-6 months of treatment would be adequate.  -Request pharmacy liaison consult to verify coverage  -Analgesic medication for right leg pain due to DVT    Uncontrolled hypertension  -Losartan 100 mg was given in the emergency department.  Continue losartan 100 mg daily tomorrow morning  -Restart Coreg at 6.25 mg by mouth twice daily  -Hydralazine 10 mg IV every 4 hours as needed for systolic blood pressure greater than 180    Hypokalemia  -Replace per protocol    Peripheral edema  -Suspect due to uncontrolled hypertension and underlying pulmonary hypertension.  Previous echocardiogram on 4/27/2021 showed moderate pulmonary hypertension  -She was given Lasix 40 mg IV once in the emergency department.  -No further Lasix ordered.  Reassess tomorrow and once potassium has been replaced consider additional Lasix if needed.    COVID-19 infection, asymptomatic  -COVID-19 precautions  -No pneumonia or hypoxia so no indication for remdesivir or dexamethasone    Reported history of diabetes  -Repeat basic metabolic panel in the morning and reassess glucose    Stable medical conditions:  Sarcoidosis  Asthma without acute exacerbation         Diet: Regular Diet Adult    DVT Prophylaxis: DOAC  Sanchez Catheter: Not present  Central Lines: None  Cardiac Monitoring: ACTIVE order. Indication: uncontrolled hypertension  Code Status: Full Code      Clinically Significant Risk Factors Present on Admission        # Hypokalemia: K = 2.9 mmol/L (Ref range: 3.4 - 5.3 mmol/L) on admission, will replace as  needed      # Hypoalbuminemia: Albumin = 3.0 g/dL (Ref range: 3.4 - 5.0 g/dL) on admission, will monitor as appropriate     # Hypertension: home medication list includes antihypertensive(s)          Disposition Plan     Likely home in 1-2 days     The patient's care was discussed with the Patient.    Marbin Falcon MD  Hospitalist Service  [facility]  Securely message with the Vocera Web Console (learn more here)  Text page via [employer] Paging/Directory         ______________________________________________________________________    Chief Complaint   Leg swelling and right leg pain    History is obtained from the patient, Dr. Hunt, and the medical record    History of Present Illness   Jose Corral is a 51 year old female with history of type 2 diabetes, hypertension, depression, suicidal ideation, alcohol abuse, asthma, B12 deficiency, sarcoidosis, obesity, and allergic rhinitis.  She was hospitalized here from 4/16/2022 through 4/20/2022 with alcohol intoxication, hypokalemia, hypophosphatemia, hypomagnesemia, mild pancreatitis, depression, and suicidal ideation.  During that admission she was prescribed Lexapro and magnesium oxide.  Her prior to admission Coreg dose was increased to 25 mg twice daily on discharge.  She was also thought to be on losartan, Neurontin, folic acid, multivitamin, and an albuterol inhaler at that time.  It is not clear that she was.  It does seem that after that admission she only took the Lexapro, magnesium oxide, and Coreg.  She recently moved out of her home and has been staying with different friends.  This was about 3 or 4 weeks ago.  During that move she misplaced all of her medications except her magnesium oxide.  So for the last 3 to 4 weeks she has only been taking magnesium oxide.  Prior to that it is not clear exactly what she was taking but it sounds like just magnesium oxide, Lexapro, and Coreg.  She presented to the [facility]  emergency department today (7/7/2022) for evaluation of leg swelling.  Both legs were swollen but the right was worse.  She was having a lot of pain in her right leg that was limiting ambulation.  Emergency department evaluation showed elevated blood pressure of 190s to 200s over 90s to 130s.  Laboratory evaluation showed potassium of 2.9 with otherwise unremarkable comprehensive metabolic panel.  Hemoglobin was 11.6 with otherwise unremarkable CBC.  COVID-19 testing was positive.  D-dimer was elevated at 0.65.  Chest x-ray is unremarkable.  CT of chest with PE protocol showed no PE.  It did suggest possible bronchitis.  Bilateral lower extremity ultrasound showed right partially occlusive DVT in the mid to distal femoral vein.  She was given Xarelto for DVT.  She was given Lasix 40 mg IV for edema.  I was asked admit her to observation for pain control and for blood pressure control.    Review of Systems    The 10 point Review of Systems is negative other than noted in the HPI or here.     Past Medical History    I have reviewed this patient's medical history and updated it with pertinent information if needed.   Past Medical History:   Diagnosis Date     Alcohol abuse      Diabetes (H)    Hypertension  Depression with suicidal ideation  Asthma  B12 deficiency  Sarcoidosis  Obesity  Allergic rhinitis      Social History   I have reviewed this patient's social history and updated it with pertinent information if needed.  Social History     Tobacco Use     Smoking status: Current Every Day Smoker     Smokeless tobacco: Never Used   Substance Use Topics     Alcohol use: Yes     Drug use: Not Currently       Family History   Coronary artery disease, diabetes, hypertension, and cerebrovascular disease    Prior to Admission Medications   Prior to Admission Medications   Prescriptions Last Dose Informant Patient Reported? Taking?   albuterol (PROAIR HFA/PROVENTIL HFA/VENTOLIN HFA) 108 (90 Base) MCG/ACT inhaler   No No    Sig: Inhale 2 puffs into the lungs every 6 hours   Patient not taking: Reported on 4/17/2022   carvedilol (COREG) 25 MG tablet   No No   Sig: Take 1 tablet (25 mg) by mouth 2 times daily (with meals)   escitalopram (LEXAPRO) 5 MG tablet   No No   Sig: Take 1 tablet (5 mg) by mouth daily   gabapentin (NEURONTIN) 100 MG capsule   No No   Sig: Take 2 capsules (200 mg) by mouth 2 times daily   Patient not taking: Reported on 4/17/2022   losartan (COZAAR) 100 MG tablet   No No   Sig: Take 1 tablet (100 mg) by mouth daily   Patient not taking: Reported on 4/17/2022   magnesium oxide (MAG-OX) 400 MG tablet   No No   Sig: Take 1 tablet (400 mg) by mouth daily      Facility-Administered Medications: None     Allergies   No Known Allergies    Physical Exam   Vital Signs: Temp: 98.4  F (36.9  C) Temp src: Oral BP: (!) 171/97 Pulse: 84   Resp: 20 SpO2: 98 % O2 Device: None (Room air)    Weight: 194 lbs 11.2 oz    GENERAL: Pleasant and cooperative. No acute distress.  EYES: Pupils equal and round. No scleral erythema or icterus.  ENT: External ears are normal without deformity. Posterior oropharynx is without erythem, swelling, or exudate.  NECK: Supple. No masses or swelling. No tenderness. Thyroid is normal without mass or tenderness.  CHEST: Clear to auscultation. Normal breath sounds. No retractions.   CV: Regular rate and rhythm. No JVD. Pulses normal.  ABDOMEN: Bowel sounds present. No tenderness. No masses or hernia.  EXTREMETIES: No clubbing, cyanosis, or ischemia.  Bilateral lower extremity edema, right greater than left.  Right lower extremity is tender.  SKIN: Warm and dry to touch. No wounds or rashes.  NEUROLOGIC: Strength and sensation are normal. Deep tendon reflexes are normal. Cranial nerves are normal.        Data   Data reviewed today: I reviewed all medications, new labs and imaging results over the last 24 hours. I personally reviewed no images or EKG's today.    Recent Labs   Lab 07/07/22  1034  07/02/22  2259   WBC 10.2 4.6   HGB 11.6* 10.8*   * 106*    320    147*   POTASSIUM 2.9* 3.0*   CHLORIDE 104 111*   CO2 29 28   BUN 15 15   CR 0.79 0.62   ANIONGAP 7 8   SABINO 9.0 8.6   * 97   ALBUMIN 3.0*  --    PROTTOTAL 7.6  --    BILITOTAL 0.3  --    ALKPHOS 81  --    ALT 11  --    AST 13  --      Recent Results (from the past 24 hour(s))   XR Knee Right 3 Views    Narrative    RIGHT KNEE THREE VIEWS  7/7/2022 11:24 AM     INDICATION: Right-sided knee pain.     COMPARISON: None.      Impression    IMPRESSION:  1.  Normal joint spacing and alignment.  2.  No fracture or joint effusion.  3.  Superior patellar spur.    CAMILLA DUBOSE MD         SYSTEM ID:  [id number]   US Lower Extremity Venous Duplex Bilateral    Narrative    ULTRASOUND BILATERAL LOWER EXTREMITY VENOUS DUPLEX  7/7/2022 1:13 PM    CLINICAL HISTORY:  Bilateral leg swelling. Elevated D-dimer.    TECHNIQUE: Venous Duplex ultrasound of bilateral lower extremities  with and without compression, augmentation and duplex. Color flow and  spectral Doppler with waveform analysis performed.    COMPARISON: Ultrasound bilateral legs 11/5/2020.    FINDINGS: Exam includes the common femoral, femoral, popliteal veins  as well as segmentally visualized deep calf veins and greater  saphenous vein.     RIGHT: Positive partially occlusive deep venous thrombosis at the  right leg extending from the mid femoral vein to the distal femoral  vein. No superficial thrombophlebitis. No popliteal cyst.    LEFT: No deep vein thrombosis. No superficial thrombophlebitis. No  popliteal cyst.      Impression    IMPRESSION:  1.  Positive right leg partially occlusive deep venous thrombosis  extending from the mid to distal femoral vein. This is new since the  comparison ultrasound from 11/5/2020.  2.  No left leg deep venous thrombosis.    COCO FARR MD         SYSTEM ID:  [id number]   Chest XR,  PA & LAT    Narrative    CHEST TWO VIEWS  7/7/2022 1:20 PM      HISTORY:  Chest pain.    COMPARISON: 4/16/2022.      Impression    IMPRESSION: Negative chest. Lungs clear. No pneumothorax. No pleural  effusions.    MELISSA STOUT MD         SYSTEM ID:  [id number]   CT Chest Pulmonary Embolism w Contrast    Narrative    CT CHEST PULMONARY EMBOLISM WITH CONTRAST 7/7/2022 2:35 PM    CLINICAL HISTORY: Right-sided chest pain; + right lower extremity DVT.    TECHNIQUE: CT angiogram chest during arterial phase injection IV  contrast. 2D and 3D MIP reconstructions were performed by the CT  technologist. Dose reduction techniques were used.   CONTRAST: 65mL Isovue-370    COMPARISON: None.    FINDINGS:  ANGIOGRAM CHEST: Pulmonary arteries are normal caliber and negative  for pulmonary emboli. Thoracic aorta is negative for dissection. No CT  evidence of right heart strain.    LUNGS AND PLEURA: No effusions. Bilateral peribronchial wall  thickening. Scattered areas of small atelectasis bilaterally again  identified.    MEDIASTINUM/AXILLAE: Stable mildly enlarged right hilar lymph node  measuring 1.3 cm, series 6 image 109. No areas of new adenopathy. No  acute mediastinal abnormality.    CORONARY ARTERY CALCIFICATION: Mild.    UPPER ABDOMEN: No acute upper abdominal abnormality.    MUSCULOSKELETAL: No acute abnormality. Mild spine degenerative  changes.      Impression    IMPRESSION:  1.  No evidence for pulmonary embolism.  2.  Mild bilateral peribronchial wall thickening that may relate to a  bronchitis.  3.  Stable mildly prominent right hilar lymph node is nonspecific.     COCO FARR MD         SYSTEM ID:  [id number]

## 2022-07-07 NOTE — ED PROVIDER NOTES
History   Chief Complaint:  Leg Swelling       HPI   Jose Corral is a 51 year old female tobacco user with history of type II diabetes mellitus, hypertension, and subdural hematoma who presents with with leg swelling which has been present for the last month. However, in the last two days it has been exacerbated and more has begun above the right knee. Along with this swelling come pain in both legs and feet, epically in the right knee. Because of this she has not been able to walk. Her feet are also numb and swollen. Within the last hour she experienced chest pain in the upper right side which has now receded. She reports urinary frequency without pain and a small red dot on medial aspect of her upper right calf. She denies shortness of breath, cough, fever, recent surgeries, rash; history of liver, kidney or heart failure. On 11/5/2020,  she was prescribed losartan for hypertension. However, she is not taking this as of now.       Review of Systems   Constitutional: Negative for fever.   Respiratory: Negative for cough and shortness of breath.    Cardiovascular: Positive for chest pain and leg swelling.   Musculoskeletal: Positive for arthralgias and joint swelling.   Skin: Negative for rash.   All other systems reviewed and are negative.      Allergies:  No Known Allergies    Medications:  albuterol   carvedilol   escitalopram   gabapentin   losartan   magnesium oxide  Amlodipine   Diclofenac     Past Medical History:     DUB   Carpal tunnel syndrome   Hypertension   Sarcoidosis  Type II diabetes mellitus   Obesity   Asthma   Subdural hematoma   Trichomonal vaginitis  Lymphadenopathy   Allergic rhinitis    Trichomonal vaginitis   Depression   Hypokalemia   Hypomagnesemia   Pneumonia   Pancreatitis    Past Surgical History:    Cholecystectomy     Family History:    Brother: heart disease,   Father: diabetes, hypertension   Mother: diabetes, heart disease, hypertension     Social History:  The patient  presents to the ED by means of ambulance.  The patient presents to the ED by themself.     Physical Exam     Patient Vitals for the past 24 hrs:   BP Temp Temp src Pulse Resp SpO2   07/07/22 1130 (!) 163/96 -- -- 92 20 97 %   07/07/22 1100 (!) 186/111 -- -- 100 15 97 %   07/07/22 1017 (!) 195/135 98.6  F (37  C) Oral 102 20 98 %   07/07/22 1015 -- -- -- -- -- 99 %       Physical Exam    HEENT:    Oropharynx is moist, without lesions or trismus.  Eyes:    Conjunctiva normal  Neck:    Supple, no meningismus.     CV:     Regular rate and rhythm.      No murmurs, rubs or gallops.      2+ DP pulses bilateral.       Marked bilaeral lower extremity edema.  PULM:    Clear to auscultation bilateral.       No respiratory distress.      Good air exchange.     No rales or wheezing.     No stridor.  ABD:    Soft, non-tender, non-distended.       No pulsatile masses.       No rebound, guarding or rigidity.  MSK:     RLE:      Tenderness to distal medial thigh and medial right knee      No warmth or erythema to the knee      Limited passive range of motion of the knee and hip secondary to pain      Compartments are soft and compressible  LYMPH:   No cervical lymphadenopathy.  NEURO:   Alert. Good muscle tone, no atrophy.     Strength and sensation intact to lower extremities  Skin:    Warm, dry and intact.    Psych:    Mood is good and affect is appropriate.        Emergency Department Course     ECG  ECG taken at 1038, ECG read at 1043  Normal sinus rhythm   Moderate voltage criteria for LVH, may be normal variant  Nonspecific T wave abnormality   Prolonged QT  abnormal ECG    No significant change as compared to prior, dated 04/18/2022.  Rate 96 bpm. OH interval 140 ms. QRS duration 82 ms. QT/QTc 428/540 ms. P-R-T axes 41 16 25.       Imaging:  XR Knee Right 3 Views   Preliminary Result   IMPRESSION:   1.  Normal joint spacing and alignment.   2.  No fracture or joint effusion.   3.  Superior patellar spur.        Report per  radiology    Laboratory:  Labs Ordered and Resulted from Time of ED Arrival to Time of ED Departure   COMPREHENSIVE METABOLIC PANEL - Abnormal       Result Value    Sodium 140      Potassium 2.9 (*)     Chloride 104      Carbon Dioxide (CO2) 29      Anion Gap 7      Urea Nitrogen 15      Creatinine 0.79      Calcium 9.0      Glucose 125 (*)     Alkaline Phosphatase 81      AST 13      ALT 11      Protein Total 7.6      Albumin 3.0 (*)     Bilirubin Total 0.3      GFR Estimate >90     D DIMER QUANTITATIVE - Abnormal    D-Dimer Quantitative 0.65 (*)    CBC WITH PLATELETS AND DIFFERENTIAL - Abnormal    WBC Count 10.2      RBC Count 3.28 (*)     Hemoglobin 11.6 (*)     Hematocrit 33.8 (*)      (*)     MCH 35.4 (*)     MCHC 34.3      RDW 16.8 (*)     Platelet Count 319      % Neutrophils 87      % Lymphocytes 6      % Monocytes 6      % Eosinophils 1      % Basophils 0      % Immature Granulocytes 0      NRBCs per 100 WBC 0      Absolute Neutrophils 8.8 (*)     Absolute Lymphocytes 0.7 (*)     Absolute Monocytes 0.6      Absolute Eosinophils 0.1      Absolute Basophils 0.0      Absolute Immature Granulocytes 0.0      Absolute NRBCs 0.0     TROPONIN I - Normal    Troponin I High Sensitivity 9     HCG QUALITATIVE PREGNANCY - Normal    hCG Serum Qualitative Negative     NT PROBNP INPATIENT - Normal    N terminal Pro BNP Inpatient 583        Emergency Department Course:        Reviewed:  I reviewed nursing notes, vitals, past medical history and Care Everywhere    Assessments:  1018 I obtained history and examined the patient as noted above.   1530 I rechecked the patient and explained findings.         Interventions:  1211 potassium chloride 40 mEq PO  Xarelto 15 mg PO  Lasix 40 mg IV    Disposition:  Patient changed over to Dr. Hunt to consult with hospital medicine service for plan of admission    Impression & Plan       Medical Decision Makin-year-old female presented to the ED with primary complaints of  acute on chronic lower extremity edema.  She has no evidence of soft tissue infection.  Some of her pain isolated to the right knee.  X-rays are unremarkable.  She has no features concerning for septic or inflammatory arthritis.  D-dimer is elevated thus underwent Doppler ultrasound of her lower extremities.  She has a right femoral DVT as the source of her increased peripheral edema and pain.  Patient has no evidence of phlegmasia and was initiated on Xarelto.    She has a previous echocardiac cardiogram revealing preserved systolic function with stage II diastolic dysfunction and moderate pulmonary pretension.  This is likely contributing to her peripheral edema.  Diuretics provided.  No evidence of pulmonary edema.  Edema likely worsening secondary to medical noncompliance.  She has marked hypertension without acute endorgan damage.  Patient given losartan and will await for blood pressure response.    She also admitted to atypical right-sided chest pain.  EKG without ischemic changes.  Troponin within normal limits.  Due to the presence of DVT, CT scan of her chest undertaken and unremarkable for PE.    Patient was unable to ambulate reliably in the ED and had significant pain related to her peripheral edema.  She is currently homeless although staying with a friend. She would have to go up 12 steps which she is incapable of currently.  Patient will be transferred to a monitored bed.    Diagnosis:    ICD-10-CM    1. Acute deep vein thrombosis (DVT) of femoral vein of right lower extremity (H)  I82.411    2. Peripheral edema  R60.9    3. Essential hypertension  I10    4. Medical non-compliance  Z91.19        Discharge Medications:  New Prescriptions    No medications on file       Scribe Disclosure:  I, Joshua Kjer, am serving as a scribe at 10:18 AM on 7/7/2022 to document services personally performed by Duong Camacho MD based on my observations and the provider's statements to me.            Doug  Duong LEMUS MD  07/08/22 0705

## 2022-07-07 NOTE — TELEPHONE ENCOUNTER
Left voice message for patient informing her no open appointments in [place] today. Recommended she keep appointment as scheduled later today in [place].     Leonor Newton RN  [facility]

## 2022-07-07 NOTE — ED NOTES
[facility]  ED Nurse Handoff Report    Jose Corral is a 51 year old female   ED Chief complaint: Leg Swelling  . ED Diagnosis:   Final diagnoses:   Acute deep vein thrombosis (DVT) of femoral vein of right lower extremity (H)   Peripheral edema   Essential hypertension   Medical non-compliance     Allergies: No Known Allergies    Code Status: Full Code  Activity level - Baseline/Home:  Independent. Activity Level - Current:   Stand by Assist. Lift room needed: No. Bariatric: No   Needed: No   Isolation: Yes. Infection: COVID    Vital Signs:   Vitals:    07/07/22 1130 07/07/22 1545 07/07/22 1550 07/07/22 1625   BP: (!) 163/96 (!) 190/127  (!) 188/110   Pulse: 92 81  84   Resp: 20      Temp:       TempSrc:       SpO2: 97%  100% 100%       Cardiac Rhythm:  ,      Pain level:    Patient confused: No. Patient Falls Risk: Yes.   Elimination Status: Has voided   Patient Report - Initial Complaint: DVT. Focused Assessment:    Arrives via EMS for bilateral leg swelling. Has been going on for a couple months, worsened within the last two days. Homeless. C/o bilateral leg pain, worse on right leg. HTN, slightly tachy, other VSS. ABC's intact. A/Ox4.         Tests Performed: labs, imaging. Abnormal Results:   Labs Ordered and Resulted from Time of ED Arrival to Time of ED Departure   COMPREHENSIVE METABOLIC PANEL - Abnormal       Result Value    Sodium 140      Potassium 2.9 (*)     Chloride 104      Carbon Dioxide (CO2) 29      Anion Gap 7      Urea Nitrogen 15      Creatinine 0.79      Calcium 9.0      Glucose 125 (*)     Alkaline Phosphatase 81      AST 13      ALT 11      Protein Total 7.6      Albumin 3.0 (*)     Bilirubin Total 0.3      GFR Estimate >90     D DIMER QUANTITATIVE - Abnormal    D-Dimer Quantitative 0.65 (*)    CBC WITH PLATELETS AND DIFFERENTIAL - Abnormal    WBC Count 10.2      RBC Count 3.28 (*)     Hemoglobin 11.6 (*)     Hematocrit 33.8 (*)      (*)     MCH 35.4 (*)      MCHC 34.3      RDW 16.8 (*)     Platelet Count 319      % Neutrophils 87      % Lymphocytes 6      % Monocytes 6      % Eosinophils 1      % Basophils 0      % Immature Granulocytes 0      NRBCs per 100 WBC 0      Absolute Neutrophils 8.8 (*)     Absolute Lymphocytes 0.7 (*)     Absolute Monocytes 0.6      Absolute Eosinophils 0.1      Absolute Basophils 0.0      Absolute Immature Granulocytes 0.0      Absolute NRBCs 0.0     TROPONIN I - Normal    Troponin I High Sensitivity 9     HCG QUALITATIVE PREGNANCY - Normal    hCG Serum Qualitative Negative     NT PROBNP INPATIENT - Normal    N terminal Pro BNP Inpatient 583     COVID-19 VIRUS (CORONAVIRUS) BY PCR     CT Chest Pulmonary Embolism w Contrast   Final Result   IMPRESSION:   1.  No evidence for pulmonary embolism.   2.  Mild bilateral peribronchial wall thickening that may relate to a   bronchitis.   3.  Stable mildly prominent right hilar lymph node is nonspecific.       COCO FARR MD            SYSTEM ID:  [id number]      Chest XR,  PA & LAT   Final Result   IMPRESSION: Negative chest. Lungs clear. No pneumothorax. No pleural   effusions.      MELISSA STOUT MD            SYSTEM ID:  [id number]      US Lower Extremity Venous Duplex Bilateral   Final Result   IMPRESSION:   1.  Positive right leg partially occlusive deep venous thrombosis   extending from the mid to distal femoral vein. This is new since the   comparison ultrasound from 11/5/2020.   2.  No left leg deep venous thrombosis.      COCO FARR MD            SYSTEM ID:  [id number]      XR Knee Right 3 Views   Final Result   IMPRESSION:   1.  Normal joint spacing and alignment.   2.  No fracture or joint effusion.   3.  Superior patellar spur.      CAMILLA DUBOSE MD            SYSTEM ID:  [id number]        .   Treatments provided: see MAR  Family Comments: N/A  OBS brochure/video discussed/provided to patient:  Yes  ED Medications:   Medications   potassium chloride 10 mEq in 100 mL sterile water  intermittent infusion (premix) (10 mEq Intravenous New Bag 7/7/22 1600)   potassium chloride (KLOR-CON) Packet 40 mEq (40 mEq Oral Given 7/7/22 1211)   sodium chloride for CT scan flush use (85 mLs Intravenous Given 7/7/22 1436)   iopamidol (ISOVUE-370) solution 500 mL (65 mLs Intravenous Given 7/7/22 1436)   HYDROcodone-acetaminophen (NORCO) 5-325 MG per tablet 1 tablet (1 tablet Oral Given 7/7/22 1447)   rivaroxaban ANTICOAGULANT (XARELTO) tablet 15 mg (15 mg Oral Given 7/7/22 1600)   losartan (COZAAR) tablet 100 mg (100 mg Oral Given 7/7/22 1600)   furosemide (LASIX) injection 40 mg (40 mg Intravenous Given 7/7/22 1600)     Drips infusing:  No  For the majority of the shift, the patient's behavior Green. Interventions performed were N/A.    Sepsis treatment initiated: No     Patient tested for COVID 19 prior to admission: YES, positive    ED Nurse Name/Phone Number: Fitz Kinsey RN,   4:42 PM    RECEIVING UNIT ED HANDOFF REVIEW    Above ED Nurse Handoff Report was reviewed: Yes  Reviewed by: Eve Acosta RN on July 7, 2022 at 5:34 PM

## 2022-07-07 NOTE — ED TRIAGE NOTES
Arrives via EMS for bilateral leg swelling. Has been going on for a couple months, worsened within the last two days. Homeless. C/o bilateral leg pain, worse on right leg. HTN, slightly tachy, other VSS. ABC's intact. A/Ox4.

## 2022-07-07 NOTE — PLAN OF CARE
ROOM # 201    Living Situation (if not independent, order SW consult):  Facility name:  : Emre Corral (Brother) [phone]    Activity level at baseline: Independent   Activity level on admit: SBA     Who will be transporting you at discharge: Friend     Patient registered to observation; given Patient Bill of Rights; given the opportunity to ask questions about observation status and their plan of care.  Patient has been oriented to the observation room, bathroom and call light is in place.    Discussed discharge goals and expectations with patient/family.

## 2022-07-08 VITALS
TEMPERATURE: 98.3 F | BODY MASS INDEX: 33.42 KG/M2 | HEART RATE: 67 BPM | OXYGEN SATURATION: 100 % | DIASTOLIC BLOOD PRESSURE: 91 MMHG | RESPIRATION RATE: 16 BRPM | SYSTOLIC BLOOD PRESSURE: 142 MMHG | WEIGHT: 194.7 LBS

## 2022-07-08 LAB
ANION GAP SERPL CALCULATED.3IONS-SCNC: 7 MMOL/L (ref 3–14)
BUN SERPL-MCNC: 12 MG/DL (ref 7–30)
CALCIUM SERPL-MCNC: 9.2 MG/DL (ref 8.5–10.1)
CHLORIDE BLD-SCNC: 104 MMOL/L (ref 94–109)
CO2 SERPL-SCNC: 29 MMOL/L (ref 20–32)
CREAT SERPL-MCNC: 0.53 MG/DL (ref 0.52–1.04)
ERYTHROCYTE [DISTWIDTH] IN BLOOD BY AUTOMATED COUNT: 17.2 % (ref 10–15)
GFR SERPL CREATININE-BSD FRML MDRD: >90 ML/MIN/1.73M2
GLUCOSE BLD-MCNC: 147 MG/DL (ref 70–99)
HCT VFR BLD AUTO: 32.9 % (ref 35–47)
HGB BLD-MCNC: 11.2 G/DL (ref 11.7–15.7)
MCH RBC QN AUTO: 34.9 PG (ref 26.5–33)
MCHC RBC AUTO-ENTMCNC: 34 G/DL (ref 31.5–36.5)
MCV RBC AUTO: 103 FL (ref 78–100)
PLATELET # BLD AUTO: 292 10E3/UL (ref 150–450)
POTASSIUM BLD-SCNC: 3.4 MMOL/L (ref 3.4–5.3)
POTASSIUM BLD-SCNC: 3.6 MMOL/L (ref 3.4–5.3)
POTASSIUM BLD-SCNC: 3.6 MMOL/L (ref 3.4–5.3)
RBC # BLD AUTO: 3.21 10E6/UL (ref 3.8–5.2)
SODIUM SERPL-SCNC: 140 MMOL/L (ref 133–144)
WBC # BLD AUTO: 8.9 10E3/UL (ref 4–11)

## 2022-07-08 PROCEDURE — 84132 ASSAY OF SERUM POTASSIUM: CPT | Performed by: INTERNAL MEDICINE

## 2022-07-08 PROCEDURE — 250N000013 HC RX MED GY IP 250 OP 250 PS 637: Performed by: PHYSICIAN ASSISTANT

## 2022-07-08 PROCEDURE — 36415 COLL VENOUS BLD VENIPUNCTURE: CPT | Performed by: INTERNAL MEDICINE

## 2022-07-08 PROCEDURE — 250N000013 HC RX MED GY IP 250 OP 250 PS 637: Performed by: INTERNAL MEDICINE

## 2022-07-08 PROCEDURE — G0378 HOSPITAL OBSERVATION PER HR: HCPCS

## 2022-07-08 PROCEDURE — 85014 HEMATOCRIT: CPT | Performed by: INTERNAL MEDICINE

## 2022-07-08 PROCEDURE — 99217 PR OBSERVATION CARE DISCHARGE: CPT | Performed by: PHYSICIAN ASSISTANT

## 2022-07-08 RX ORDER — METHOCARBAMOL 500 MG/1
500 TABLET, FILM COATED ORAL 3 TIMES DAILY PRN
Qty: 12 TABLET | Refills: 0 | Status: ON HOLD | OUTPATIENT
Start: 2022-07-08 | End: 2023-02-20

## 2022-07-08 RX ORDER — ACETAMINOPHEN 325 MG/1
650 TABLET ORAL EVERY 6 HOURS PRN
Start: 2022-07-08 | End: 2022-07-08

## 2022-07-08 RX ORDER — CARVEDILOL 25 MG/1
25 TABLET ORAL 2 TIMES DAILY WITH MEALS
Status: DISCONTINUED | OUTPATIENT
Start: 2022-07-08 | End: 2022-07-08 | Stop reason: HOSPADM

## 2022-07-08 RX ORDER — ACETAMINOPHEN 500 MG
500-1000 TABLET ORAL EVERY 6 HOURS PRN
Qty: 100 TABLET | Refills: 0 | Status: SHIPPED | OUTPATIENT
Start: 2022-07-08

## 2022-07-08 RX ORDER — METHOCARBAMOL 500 MG/1
500 TABLET, FILM COATED ORAL 3 TIMES DAILY PRN
Status: DISCONTINUED | OUTPATIENT
Start: 2022-07-08 | End: 2022-07-08 | Stop reason: HOSPADM

## 2022-07-08 RX ORDER — POTASSIUM CHLORIDE 1500 MG/1
40 TABLET, EXTENDED RELEASE ORAL ONCE
Status: COMPLETED | OUTPATIENT
Start: 2022-07-08 | End: 2022-07-08

## 2022-07-08 RX ORDER — CARVEDILOL 25 MG/1
25 TABLET ORAL 2 TIMES DAILY WITH MEALS
Status: DISCONTINUED | OUTPATIENT
Start: 2022-07-08 | End: 2022-07-08

## 2022-07-08 RX ORDER — LOSARTAN POTASSIUM 100 MG/1
100 TABLET ORAL DAILY
Qty: 30 TABLET | Refills: 0 | Status: SHIPPED | OUTPATIENT
Start: 2022-07-09

## 2022-07-08 RX ORDER — CARVEDILOL 25 MG/1
25 TABLET ORAL 2 TIMES DAILY WITH MEALS
Qty: 60 TABLET | Refills: 0 | Status: SHIPPED | OUTPATIENT
Start: 2022-07-08

## 2022-07-08 RX ADMIN — METHOCARBAMOL 500 MG: 500 TABLET ORAL at 08:30

## 2022-07-08 RX ADMIN — ACETAMINOPHEN 975 MG: 325 TABLET, FILM COATED ORAL at 02:03

## 2022-07-08 RX ADMIN — OXYCODONE HYDROCHLORIDE 5 MG: 5 TABLET ORAL at 08:30

## 2022-07-08 RX ADMIN — CARVEDILOL 6.25 MG: 6.25 TABLET, FILM COATED ORAL at 08:30

## 2022-07-08 RX ADMIN — LOSARTAN POTASSIUM 100 MG: 100 TABLET, FILM COATED ORAL at 08:30

## 2022-07-08 RX ADMIN — RIVAROXABAN 15 MG: 15 TABLET, FILM COATED ORAL at 08:30

## 2022-07-08 RX ADMIN — Medication 400 MG: at 08:30

## 2022-07-08 RX ADMIN — POTASSIUM CHLORIDE 40 MEQ: 1500 TABLET, EXTENDED RELEASE ORAL at 03:44

## 2022-07-08 RX ADMIN — ACETAMINOPHEN 975 MG: 325 TABLET, FILM COATED ORAL at 10:20

## 2022-07-08 RX ADMIN — OXYCODONE HYDROCHLORIDE 5 MG: 5 TABLET ORAL at 12:48

## 2022-07-08 RX ADMIN — CARVEDILOL 18.75 MG: 12.5 TABLET, FILM COATED ORAL at 12:44

## 2022-07-08 ASSESSMENT — ACTIVITIES OF DAILY LIVING (ADL): DEPENDENT_IADLS:: INDEPENDENT

## 2022-07-08 NOTE — PLAN OF CARE
"PRIMARY DIAGNOSIS: DVT  OUTPATIENT/OBSERVATION GOALS TO BE MET BEFORE DISCHARGE:  1. Anticoagulant treatment initiated: Yes; Xarelto    2. Diagnostic testing complete (if applicable): Yes    3. Adequate home care or support arranged for LMWH administration: No    4. Return to near baseline physical activity: No    5. Pain Status: Improved-controlled with oral pain medications.    Discharge Planner Nurse   Safe discharge environment identified: No  Barriers to discharge: Yes       Entered by: Geovanna Valentin RN 07/08/2022 10:04 AM     Please review provider order for any additional goals. Nurse to notify provider when observation goals have been met and patient is ready for discharge.    COVID precautions maintained. Patient up with SBA. She is alert and oriented x4. IV site saline locked. Per telemetry technician, patient is in sinus rhythm. Patient shares concerns of having a \"false positive\" COVID test. Patient reports 8/10 pain in her RLE, PRN OXYCODONE and ROBAXIN given. She also continuously states that she is sleepy. Purewick in place. Bilateral compression stockings maintained. Requesting that hospital staff does not tell anyone she knows, that she was diagnosed with COVID.   "
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
## 2022-07-08 NOTE — PROGRESS NOTES
"[facility] RCAT     Date: 7/7/22  Admission Dx: unstable angina/Covid+  Pulmonary History: Asthma  Home Nebulizer/MDI Use: as needed  Home Oxygen: none  Acuity Level (RCAT flow sheet): 5  Aerosol Therapy initiated: Albuterol MDI Q6 prn  Current Oxygen Requirements: none  Current SpO2: 98%  Re-evaluation date:  Patient Education: Call for MDI as needed      See \"RT Assessments\" flow sheet for patient assessment scoring and Acuity Level Details.     Vera Strickland, RT          "

## 2022-07-08 NOTE — PLAN OF CARE
Patient's After Visit Summary was reviewed with patient     Patient verbalized understanding of After Visit Summary, recommended follow up and was given an opportunity to ask questions.     Discharge medications sent home with patient/family: Yes    Discharged with friend

## 2022-07-08 NOTE — CONSULTS
Discharge Pharmacy Test Claim    Eliquis or xarelto is covered through patient's [place] medical assistance plan with a copay of $3.    Test Claim Copay   eliquis 3.00   xarelto 3.00       Evelia Bland 7/8  [facility] Pharmacy Liaison  Ph: [phone] Pager: [phone]

## 2022-07-08 NOTE — PLAN OF CARE
PRIMARY DIAGNOSIS: DVT  OUTPATIENT/OBSERVATION GOALS TO BE MET BEFORE DISCHARGE:  1. Anticoagulant treatment initiated: Yes; Xarelto    2. Diagnostic testing complete (if applicable): Yes    3. Adequate home care or support arranged for LMWH administration: No    4. Return to near baseline physical activity: No    5. Pain Status: Improved-controlled with oral pain medications.    Discharge Planner Nurse   Safe discharge environment identified: No  Barriers to discharge: Yes       Entered by: Geovanna Valentin RN 07/08/2022 12:16 PM     Please review provider order for any additional goals. Nurse to notify provider when observation goals have been met and patient is ready for discharge.    COVID precautions maintained. Patient up with SBA. She is alert and oriented x4. IV site saline locked. Per telemetry technician, patient is in sinus rhythm. Purewick in place. Bilateral compression stockings maintained.

## 2022-07-08 NOTE — DISCHARGE SUMMARY
[facility]    Discharge Summary  Hospitalist    Date of Admission:  7/7/2022  Date of Discharge:  7/8/2022  4:06 PM  Discharging Provider: Leatha Edward PA-C  Date of Service (when I saw the patient): 7/8/22    Discharge Diagnoses   Right lower extremity DVT  Uncontrolled HTN  Peripheral Edema  COVID 19 Infection, asymptomatic       Hospital Course     Jose Corral is a 51 year old female with history of type 2 diabetes, hypertension, depression, suicidal ideation, alcohol abuse, asthma, B12 deficiency, sarcoidosis, obesity, and allergic rhinitis.  She was admitted on 7/7/2022 started on treatment for DVT, titration of blood pressure medications.  She presented with leg swelling to the ED, noted to be quite hypertensive unfortunately had misplaced her previous medications for BP. Incidentally found to be COVID 19 positive without symptoms or clinical respiratory illness. CT chest showed no PE, did show possible bronchitis however patient without these symptoms.   She was given Xarelto for DVT, lasix for LE edema. Intended treatment course with Xarelto 15 mg twice daily for 21 days then 20 mg by mouth daily, 3-6 months of treatment would be adequate.   Had difficulty ambulating due to leg discomfort in the ED which improved after admitted to observation. Compression stocks were supplied. Started back on her previous BP medications with losartan, coreg. Social work was involved with the patient's care to assist with coordination of community resources.     Please see admitting H & P  by Marbin Flacon MD, on 7/7/2022 for full details of the encounter.     Problem List:    Right lower extremity DVT  -Continue Xarelto 15 mg twice daily for 21 days then 20 mg by mouth daily.  3-6 months of treatment would be adequate.  -Analgesic medication for right leg pain due to DVT     Uncontrolled hypertension  -Losartan 100 mg was given in the emergency department.  Continue losartan 100 mg daily  tomorrow morning  -Restart Coreg at 6.25 mg by mouth twice daily  -Hydralazine 10 mg IV every 4 hours as needed for systolic blood pressure greater than 180     Hypokalemia  -Replace per protocol     Peripheral edema  -Suspect due to uncontrolled hypertension and underlying pulmonary hypertension.  Previous echocardiogram on 4/27/2021 showed moderate pulmonary hypertension  -She was given Lasix 40 mg IV once in the emergency department.  -No further Lasix ordered.  Reassess tomorrow and once potassium has been replaced consider additional Lasix if needed.     COVID-19 infection, asymptomatic  -COVID-19 precautions  -No pneumonia or hypoxia so no indication for remdesivir or dexamethasone    Pending Results   None.    Code Status   Full Code       Primary Care Physician   Physician No Ref-Primary      INTERVAL HISTORY  Pain in RLE improved today. Discomfort previously located behind right knee, upper calf. Edema in lower extremities improving. Ambulating in the room. No hypoxia. No cough or fever. Tolerating diet. No chest pain.       BP (!) 142/91 (BP Location: Left arm)   Pulse 67   Temp 98.3  F (36.8  C) (Oral)   Resp 16   Wt 88.3 kg (194 lb 11.2 oz)   SpO2 100%   BMI 33.42 kg/m      Constitutional: Awake, alert, no apparent distress  Respiratory:  Normal work of breathing. Lungs clear to auscultation bilaterally, no crackles or wheezing.  Cardiovascular: Regular rate and rhythm, normal S1 and S2, and no murmur appreciated.   GI: Bowel sounds present. soft, non-distended, non-tender.   Skin/Integument: Warm, dry. Compression stockings in place.  MK: Mild edema bilateral LE. Compression stockings in place.   Neuro: No focal deficits. Moving all extremities with normal strength. Coordination and sensation grossly intact. Speech clear.   Psych: Appropriate affect.        Discharge Disposition   Discharged to friend's apartment.   Condition at discharge: Good    Consultations This Hospital Stay   PHARMACY  LIAISON FOR MEDICATION COVERAGE CONSULT  SOCIAL WORK IP CONSULT  PHARMACY DISCHARGE EDUCATION BY PHARMACIST    Time Spent on this Encounter   ILeatha PA-C, personally saw the patient today and spent greater than 30 minutes discharging this patient.    Discharge Orders      Reason for your hospital stay    Leg pain, found to have DVT     Follow-up and recommended labs and tests     Follow up with primary care provider, within 7-10 days to evaluate medication change, to evaluate treatment change, and for hospital follow- up.  The following labs/tests are recommended: CBC, BMP, BP recheck.     Activity    Your activity upon discharge: activity as tolerated     When to contact your care team    Call your primary care doctor if you have any of the following: temperature greater than 101 F, worsening shortness of breath, increased swelling, worsening pain, new or unrelenting diarrhea, or any other concerning symptoms. Call 911 or go to the emergency room if you need immediate assistance.     Discharge Instructions    -Use compression stockings to help with pain, swelling from DVT  -NO NSAIDS - (ibuprofen, aspirin, aleve) unless otherwise directed by your primary care provider     Diet    Follow this diet upon discharge: Orders Placed This Encounter      Regular Diet Adult     Discharge Medications   Current Discharge Medication List      START taking these medications    Details   acetaminophen (TYLENOL) 325 MG tablet Take 2 tablets (650 mg) by mouth every 6 hours as needed for mild pain    Associated Diagnoses: Acute deep vein thrombosis (DVT) of femoral vein of right lower extremity (H)      losartan (COZAAR) 100 MG tablet Take 1 tablet (100 mg) by mouth daily  Qty: 30 tablet, Refills: 0    Associated Diagnoses: Hypertensive urgency      methocarbamol (ROBAXIN) 500 MG tablet Take 1 tablet (500 mg) by mouth 3 times daily as needed for other (leg pain) If tylenol is ineffective  Qty: 12 tablet, Refills: 0     Associated Diagnoses: Acute deep vein thrombosis (DVT) of femoral vein of right lower extremity (H)      rivaroxaban ANTICOAGULANT (XARELTO) 20 MG TABS tablet 15 mg twice daily with food for 3 weeks, then 20 mg once daily with food (NOTE: will need 42 15 mg tablets the first time this is filled)  Qty: 60 tablet, Refills: 1    Comments: Future refills by primary care physician or cardiologist  Associated Diagnoses: Acute deep vein thrombosis (DVT) of femoral vein of right lower extremity (H)         CONTINUE these medications which have CHANGED    Details   carvedilol (COREG) 25 MG tablet Take 1 tablet (25 mg) by mouth 2 times daily (with meals)  Qty: 60 tablet, Refills: 0    Associated Diagnoses: Hypertensive urgency         CONTINUE these medications which have NOT CHANGED    Details   escitalopram (LEXAPRO) 5 MG tablet Take 1 tablet (5 mg) by mouth daily  Qty: 30 tablet, Refills: 3    Associated Diagnoses: Depression, unspecified depression type      magnesium oxide (MAG-OX) 400 MG tablet Take 1 tablet (400 mg) by mouth daily  Qty: 30 tablet, Refills: 3    Associated Diagnoses: Hypomagnesemia         STOP taking these medications       albuterol (PROAIR HFA/PROVENTIL HFA/VENTOLIN HFA) 108 (90 Base) MCG/ACT inhaler Comments:   Reason for Stopping:             Allergies   No Known Allergies  Data   Most Recent 3 CBC's:Recent Labs   Lab Test 07/08/22  1023 07/07/22  1034 07/02/22  2259   WBC 8.9 10.2 4.6   HGB 11.2* 11.6* 10.8*   * 103* 106*    319 320      Most Recent 3 BMP's:  Recent Labs   Lab Test 07/08/22  1023 07/08/22  0256 07/07/22  2056 07/07/22  1034 07/02/22  2259     --   --  140 147*   POTASSIUM 3.6  3.6 3.4 3.1* 2.9* 3.0*   CHLORIDE 104  --   --  104 111*   CO2 29  --   --  29 28   BUN 12  --   --  15 15   CR 0.53  --   --  0.79 0.62   ANIONGAP 7  --   --  7 8   SABINO 9.2  --   --  9.0 8.6   *  --   --  125* 97     Most Recent 2 LFT's:  Recent Labs   Lab Test 07/07/22  1034  04/19/22  0706   AST 13 53*   ALT 11 29   ALKPHOS 81 82   BILITOTAL 0.3 0.5     Most Recent INR's and Anticoagulation Dosing History:  Anticoagulation Dose History     Recent Dosing and Labs Latest Ref Rng & Units 11/5/2020 3/12/2021 3/27/2022    INR 0.85 - 1.15 0.95 0.93 1.05        Most Recent 3 Troponin's:  Recent Labs   Lab Test 04/28/21  0653 04/27/21  1751 03/12/21  1134   TROPI <0.015 <0.015 <0.015     Most Recent Cholesterol Panel:No lab results found.  Most Recent 6 Bacteria Isolates From Any Culture (See EPIC Reports for Culture Details):  Recent Labs   Lab Test 10/16/20  1242 10/16/20  1143   CULT No growth No growth     Most Recent TSH, T4 and A1c Labs:  Recent Labs   Lab Test 04/18/22  0843 04/27/21  1751   TSH  --  1.30   A1C 5.2 5.2     Results for orders placed or performed during the hospital encounter of 07/07/22   XR Knee Right 3 Views    Narrative    RIGHT KNEE THREE VIEWS  7/7/2022 11:24 AM     INDICATION: Right-sided knee pain.     COMPARISON: None.      Impression    IMPRESSION:  1.  Normal joint spacing and alignment.  2.  No fracture or joint effusion.  3.  Superior patellar spur.    CAMILLA DUBOSE MD         SYSTEM ID:  [id number]   Chest XR,  PA & LAT    Narrative    CHEST TWO VIEWS  7/7/2022 1:20 PM     HISTORY:  Chest pain.    COMPARISON: 4/16/2022.      Impression    IMPRESSION: Negative chest. Lungs clear. No pneumothorax. No pleural  effusions.    MELISSA STOUT MD         SYSTEM ID:  [id number]   US Lower Extremity Venous Duplex Bilateral    Narrative    ULTRASOUND BILATERAL LOWER EXTREMITY VENOUS DUPLEX  7/7/2022 1:13 PM    CLINICAL HISTORY:  Bilateral leg swelling. Elevated D-dimer.    TECHNIQUE: Venous Duplex ultrasound of bilateral lower extremities  with and without compression, augmentation and duplex. Color flow and  spectral Doppler with waveform analysis performed.    COMPARISON: Ultrasound bilateral legs 11/5/2020.    FINDINGS: Exam includes the common femoral, femoral,  popliteal veins  as well as segmentally visualized deep calf veins and greater  saphenous vein.     RIGHT: Positive partially occlusive deep venous thrombosis at the  right leg extending from the mid femoral vein to the distal femoral  vein. No superficial thrombophlebitis. No popliteal cyst.    LEFT: No deep vein thrombosis. No superficial thrombophlebitis. No  popliteal cyst.      Impression    IMPRESSION:  1.  Positive right leg partially occlusive deep venous thrombosis  extending from the mid to distal femoral vein. This is new since the  comparison ultrasound from 11/5/2020.  2.  No left leg deep venous thrombosis.    COCO FARR MD         SYSTEM ID:  [id number]   CT Chest Pulmonary Embolism w Contrast    Narrative    CT CHEST PULMONARY EMBOLISM WITH CONTRAST 7/7/2022 2:35 PM    CLINICAL HISTORY: Right-sided chest pain; + right lower extremity DVT.    TECHNIQUE: CT angiogram chest during arterial phase injection IV  contrast. 2D and 3D MIP reconstructions were performed by the CT  technologist. Dose reduction techniques were used.   CONTRAST: 65mL [id number]    COMPARISON: None.    FINDINGS:  ANGIOGRAM CHEST: Pulmonary arteries are normal caliber and negative  for pulmonary emboli. Thoracic aorta is negative for dissection. No CT  evidence of right heart strain.    LUNGS AND PLEURA: No effusions. Bilateral peribronchial wall  thickening. Scattered areas of small atelectasis bilaterally again  identified.    MEDIASTINUM/AXILLAE: Stable mildly enlarged right hilar lymph node  measuring 1.3 cm, series 6 image 109. No areas of new adenopathy. No  acute mediastinal abnormality.    CORONARY ARTERY CALCIFICATION: Mild.    UPPER ABDOMEN: No acute upper abdominal abnormality.    MUSCULOSKELETAL: No acute abnormality. Mild spine degenerative  changes.      Impression    IMPRESSION:  1.  No evidence for pulmonary embolism.  2.  Mild bilateral peribronchial wall thickening that may relate to a  bronchitis.  3.  Stable  mildly prominent right hilar lymph node is nonspecific.     COCO FARR MD         SYSTEM ID:  [id number]       Leatha Edward PA-C  [facility]

## 2022-07-08 NOTE — PHARMACY - DISCHARGE MEDICATION RECONCILIATION AND EDUCATION
Jose Corral     1970      female    [id number]                                [id number]    Allergy: Patient has no known allergies.    RX: Discharge Medication Consult by Pharmacist  EDUCATION:     Patient was educated about the following NEW medications:   rivaroxaban (Xarelto)    Patient was educated on the following for each discharge medication:  Rationale for therapy  Duration of treatment  Dosing and or monitoring drug levels  Common side effects  Importance of compliance  Drug/food interactions  Missed doses  Self monitoring parameters    OUTCOMES:  Patient verbalized understanding. Pt is aware of signs/symptoms of bleeding to watch for, avoidance of NSAIDs and aspirin, pt was instructed to let her dental and medical providers know that she is on AC with rivaroxaban.  Left written materials and instructions (Clinical notes from EPIC) - Guide to the Newer Oral Anticoagulants booklet.

## 2022-07-08 NOTE — PHARMACY-ADMISSION MEDICATION HISTORY
Admission medication history interview status for this patient is complete. See [facility] admission navigator for allergy information, prior to admission medications and immunization status.     Medication history interview done, indicate source(s): Patient  Medication history resources (including written lists, pill bottles, clinic record): [facility]  Pharmacy: [facility] for discharge    Changes made to PTA medication list:  Added: none  Changed: none  Reported as Not Taking: not been taking any medications since at least May '22 (left carvedilol, escitalopram, and magnesium since had active refills)  Removed: gabapentin, losartan (did not have refills), albuterol    Actions taken by pharmacist (provider contacted, etc):None     Additional medication history information:   - pt reports interest in restarting her maintenance meds    Medication reconciliation/reorder completed by provider prior to medication history?  Y   (Y/N)     Prior to Admission medications    Medication Sig Last Dose Taking? Auth Provider Long Term End Date   carvedilol (COREG) 25 MG tablet Take 25 mg by mouth 2 times daily (with meals) More than a month Yes Unknown, Entered By History No    escitalopram (LEXAPRO) 5 MG tablet Take 1 tablet (5 mg) by mouth daily More than a month at Unknown time Yes Austin Quiros MD Yes    magnesium oxide (MAG-OX) 400 MG tablet Take 1 tablet (400 mg) by mouth daily More than a month at Unknown time Yes Austin Quiros MD

## 2022-07-08 NOTE — PLAN OF CARE
PRIMARY DIAGNOSIS: DVT  OUTPATIENT/OBSERVATION GOALS TO BE MET BEFORE DISCHARGE:  1. Anticoagulant treatment initiated: Yes; Xarelto    2. Diagnostic testing complete (if applicable): Yes    3. Adequate home care or support arranged for LMWH administration: N/A    4. Return to near baseline physical activity: No    5. Pain Status: Improved-controlled with oral pain medications.    Discharge Planner Nurse   Safe discharge environment identified: No  Barriers to discharge: Yes       Entered by: Kya Hastings RN 07/08/2022 3:53 AM  Pt AO x4. VSS on RA except BP elevated. LS clear, BS active. Pt up with SBA. Purewick in place. IV SL. Pt tolerating regular diet. Notes pain to be 9/10 when moving it but minimal when resting.   BP (!) 161/102 (BP Location: Left arm)   Pulse 71   Temp 97.8  F (36.6  C) (Oral)   Resp 18   Wt 88.3 kg (194 lb 11.2 oz)   SpO2 97%   BMI 33.42 kg/m    Please review provider order for any additional goals.   Nurse to notify provider when observation goals have been met and patient is ready for discharge.

## 2022-07-08 NOTE — DISCHARGE INSTRUCTIONS
"Discharge Instructions for COVID-19 Patients  You have--or may have--COVID-19. Please follow the instructions listed below.   If you have a weakened immune system, discuss with your doctor any other actions you need to take.  How can I protect others?  If you have symptoms (fever, cough, body aches or trouble breathing):  Stay home and away from others (self-isolate) until:  Your other symptoms have resolved (gotten better). And   You've had no fever--and no medicine that reduces fever--for 1 full day (24 hours). And   At least 10 days have passed since your symptoms started. (You may need to wait 20 days. Follow the advice of your care team.)  If you don't show symptoms, but testing showed that you have COVID-19:  Stay home and away from others (self-isolate) until at least  5 days have passed since the date of your first positive COVID-19 test. (~7/13)  During this time  Stay in your own room, even for meals. Use your own bathroom if you can.  Stay away from others in your home. No hugging, kissing or shaking hands. No visitors.  Don't go to work, school or anywhere else.  Clean \"high touch\" surfaces often (doorknobs, counters, handles). Use household cleaning spray or wipes.  You'll find a full list of  on the EPA website: www.epa.gov/pesticide-registration/list-n-disinfectants-use-against-sars-cov-2.  Cover your mouth and nose with a mask or other face covering to avoid spreading germs.  Wash your hands and face often. Use soap and water.  Caregivers in these groups are at risk for severe illness due to COVID-19:  People 65 years and older  People who live in a nursing home or long-term care facility  People with chronic disease (lung, heart, cancer, diabetes, kidney, liver, immunologic)  People who have a weakened immune system, including those who:  Are in cancer treatment  Take medicine that weakens the immune system, such as corticosteroids  Had a bone marrow or organ transplant  Have an immune " deficiency  Have poorly controlled HIV or AIDS  Are obese (body mass index of 40 or higher)  Smoke regularly  Caregivers should wear gloves while washing dishes, handling laundry and cleaning bedrooms and bathrooms.  Use caution when washing and drying laundry: Don't shake dirty laundry and use the warmest water setting that you can.  For more tips on managing your health at home, go to www.cdc.gov/coronavirus/2019-ncov/downloads/10Things.pdf.  How can I take care of myself at home?  Get lots of rest. Drink extra fluids (unless a doctor has told you not to).  Take Tylenol (acetaminophen) for fever or pain. If you have liver or kidney problems, ask your family doctor if it's okay to take Tylenol.   Adults can take either:   650 mg (two 325 mg pills) every 4 to 6 hours, or   1,000 mg (two 500 mg pills) every 8 hours as needed.  Note: Don't take more than 3,000 mg in one day. Acetaminophen is found in many medicines (both prescribed and over-the-counter medicines). Read all labels to be sure you don't take too much.   For children, check the Tylenol bottle for the right dose. The dose is based on the child's age or weight.  If you have other health problems (like cancer, heart failure, an organ transplant or severe kidney disease): Call your specialty clinic if you don't feel better in the next 2 days.  Know when to call 911. Emergency warning signs include:  Trouble breathing or shortness of breath  Pain or pressure in the chest that doesn't go away  Feeling confused like you haven't felt before, or not being able to wake up  Bluish-colored lips or face  Your doctor may have prescribed a blood thinner medicine. Follow their instructions.  Where can I get more information?   [employer] - About COVID-19:   [email or web address]  CDC - What to Do If You're Sick: www.cdc.gov/coronavirus/2019-ncov/about/steps-when-sick.html  CDC - Ending Home Isolation:  www.cdc.gov/coronavirus/2019-ncov/hcp/disposition-in-home-patients.html  CDC - Caring for Someone: www.cdc.gov/coronavirus/2019-ncov/if-you-are-sick/care-for-someone.html  [facility] - Interim Guidance for Hospital Discharge to Home: www.health.[facility].[place]/diseases/coronavirus/hcp/hospdischarge.pdf  Below are the COVID-19 hotlines at the [place] Department of Health ([facility]). Interpreters are available.  For health questions: Call 053-703-3416 or 1-906.850.7579 (7 a.m. to 7 p.m.)  For questions about schools and childcare: Call 937-035-5365 or 1-342.339.3203 (7 a.m. to 7 p.m.)    For informational purposes only. Not to replace the advice of your health care provider. Clinically reviewed by Dr. Rolf Armendariz.   Copyright   2020 [facility]. All rights reserved. [employer] [id number] - REV 01/05/21.

## 2022-07-08 NOTE — PLAN OF CARE
Goal Outcome Evaluation:    PRIMARY DIAGNOSIS: DVT  OUTPATIENT/OBSERVATION GOALS TO BE MET BEFORE DISCHARGE:  1. Anticoagulant treatment initiated: Yes; Xarelto    2. Diagnostic testing complete (if applicable): Yes    3. Adequate home care or support arranged for LMWH administration: N/A    4. Return to near baseline physical activity: No    5. Pain Status: Improved-controlled with oral pain medications.    Discharge Planner Nurse   Safe discharge environment identified: No  Barriers to discharge: Yes       Entered by: Eve Acosta RN 07/07/2022 10:52 PM  A&Ox4. Vss on RA. Up with SBA. Pt has increased pain in RLE. Purwick placed on arrival to unit. Ordered potassium recheck after pt received replacement in ED. K+ now 3.1. Replacement ordered per protocol- Recheck in for 3am. Will continue monitoring.   Please review provider order for any additional goals.   Nurse to notify provider when observation goals have been met and patient is ready for discharge.

## 2022-07-08 NOTE — PLAN OF CARE
PRIMARY DIAGNOSIS: DVT  OUTPATIENT/OBSERVATION GOALS TO BE MET BEFORE DISCHARGE:  1. Anticoagulant treatment initiated: Yes; Xarelto    2. Diagnostic testing complete (if applicable): Yes    3. Adequate home care or support arranged for LMWH administration: N/A    4. Return to near baseline physical activity: No    5. Pain Status: Improved-controlled with oral pain medications.    Discharge Planner Nurse   Safe discharge environment identified: No  Barriers to discharge: Yes       Entered by: Kya Hastings RN 07/08/2022 4:39 AM  Pt AO x4. VSS on RA except BP elevated. LS clear, BS active. Pt up with SBA. Purewick in place. IV SL. Pt tolerating regular diet. Tele monitor in place SR 70s.  Notes pain to be worse when moving. Pt potassium was 3.4 at recheck, potassium 40 mEq was administered according to order set.   BP (!) 161/102 (BP Location: Left arm)   Pulse 71   Temp 97.8  F (36.6  C) (Oral)   Resp 18   Wt 88.3 kg (194 lb 11.2 oz)   SpO2 97%   BMI 33.42 kg/m    Please review provider order for any additional goals.   Nurse to notify provider when observation goals have been met and patient is ready for discharge.

## 2022-07-09 ENCOUNTER — PATIENT OUTREACH (OUTPATIENT)
Dept: CARE COORDINATION | Facility: CLINIC | Age: 52
End: 2022-07-09

## 2022-07-09 DIAGNOSIS — Z71.89 OTHER SPECIFIED COUNSELING: ICD-10-CM

## 2022-07-09 NOTE — PROGRESS NOTES
Clinic Care Coordination Contact  [facility]: Post-Discharge Note  SITUATION                                                      Admission:    Admission Date: 07/07/22   Reason for Admission: Leg pain, found to have DVT  Discharge:   Discharge Date: 07/08/22  Discharge Diagnosis: Leg pain, found to have DVT    BACKGROUND                                                      Per hospital discharge summary and inpatient provider notes:    Jose Corral is a 51 year old female with history of type 2 diabetes, hypertension, depression, suicidal ideation, alcohol abuse, asthma, B12 deficiency, sarcoidosis, obesity, and allergic rhinitis.  She was hospitalized here from 4/16/2022 through 4/20/2022 with alcohol intoxication, hypokalemia, hypophosphatemia, hypomagnesemia, mild pancreatitis, depression, and suicidal ideation.  During that admission she was prescribed Lexapro and magnesium oxide.  Her prior to admission Coreg dose was increased to 25 mg twice daily on discharge.  She was also thought to be on losartan, Neurontin, folic acid, multivitamin, and an albuterol inhaler at that time.  It is not clear that she was.  It does seem that after that admission she only took the Lexapro, magnesium oxide, and Coreg.  She recently moved out of her home and has been staying with different friends.  This was about 3 or 4 weeks ago.  During that move she misplaced all of her medications except her magnesium oxide.  So for the last 3 to 4 weeks she has only been taking magnesium oxide.  Prior to that it is not clear exactly what she was taking but it sounds like just magnesium oxide, Lexapro, and Coreg.  She presented to the [facility] emergency department today (7/7/2022) for evaluation of leg swelling.  Both legs were swollen but the right was worse.  She was having a lot of pain in her right leg that was limiting ambulation.  Emergency department evaluation showed elevated blood pressure of 190s to  "200s over 90s to 130s.  Laboratory evaluation showed potassium of 2.9 with otherwise unremarkable comprehensive metabolic panel.  Hemoglobin was 11.6 with otherwise unremarkable CBC.  COVID-19 testing was positive.  D-dimer was elevated at 0.65.  Chest x-ray is unremarkable.  CT of chest with PE protocol showed no PE.  It did suggest possible bronchitis.  Bilateral lower extremity ultrasound showed right partially occlusive DVT in the mid to distal femoral vein.  She was given Xarelto for DVT.  She was given Lasix 40 mg IV for edema.  I was asked admit her to observation for pain control and for blood pressure control.    ASSESSMENT      Enrollment  Primary Care Care Coordination Status: Not a Candidate (Patient is not yet established and does not think that she wants to have a primary provider at [place])    Discharge Assessment  How are you doing now that you are home?: \"Doing okay\"  How are your symptoms? (Red Flag symptoms escalate to triage hotline per guidelines): Improved  Do you feel your condition is stable enough to be safe at home until your provider visit?: Yes  Does the patient have their discharge instructions? : Yes  Does the patient have questions regarding their discharge instructions? : No  Were you started on any new medications or were there changes to any of your previous medications? : Yes  Does the patient have all of their medications?: Yes  Do you have questions regarding any of your medications? : No  Do you have all of your needed medical supplies or equipment (DME)?  (i.e. oxygen tank, CPAP, cane, etc.): Yes  Discharge follow-up appointment scheduled within 14 calendar days? : Yes  Discharge Follow Up Appointment Date: 07/18/22  Discharge Follow Up Appointment Scheduled with?: Primary Care Provider    Post-op ([facility] Only)  If the patient had a surgery or procedure, do they have any questions for a nurse?: No      PLAN                                                      Outpatient " Plan:    Follow up with primary care provider, within 7-10 days to evaluate medication change, to evaluate treatment change, and for hospital follow- up. The following labs/tests are recommended: CBC, BMP, BP  recheck.    Future Appointments   Date Time Provider Department Center   7/18/2022 11:30 AM Yee Valentin APRN CNP [facility]         For any urgent concerns, please contact our 24 hour nurse triage line: [phone] ([phone])         MARCO A Mitchell  [phone]  Connected Care Resource [facility]

## 2022-07-25 ENCOUNTER — OFFICE VISIT (OUTPATIENT)
Dept: INTERNAL MEDICINE | Facility: CLINIC | Age: 52
End: 2022-07-25
Payer: MEDICAID

## 2022-07-25 VITALS
DIASTOLIC BLOOD PRESSURE: 102 MMHG | RESPIRATION RATE: 20 BRPM | HEIGHT: 63 IN | WEIGHT: 215 LBS | TEMPERATURE: 97.8 F | BODY MASS INDEX: 38.09 KG/M2 | OXYGEN SATURATION: 99 % | SYSTOLIC BLOOD PRESSURE: 166 MMHG | HEART RATE: 89 BPM

## 2022-07-25 DIAGNOSIS — I10 ESSENTIAL HYPERTENSION: Primary | ICD-10-CM

## 2022-07-25 DIAGNOSIS — I82.411 ACUTE DEEP VEIN THROMBOSIS (DVT) OF FEMORAL VEIN OF RIGHT LOWER EXTREMITY (H): ICD-10-CM

## 2022-07-25 DIAGNOSIS — R60.9 EDEMA, UNSPECIFIED TYPE: ICD-10-CM

## 2022-07-25 DIAGNOSIS — E66.01 MORBID OBESITY (H): ICD-10-CM

## 2022-07-25 DIAGNOSIS — R73.09 ELEVATED GLUCOSE: ICD-10-CM

## 2022-07-25 LAB
ERYTHROCYTE [DISTWIDTH] IN BLOOD BY AUTOMATED COUNT: 17.2 % (ref 10–15)
HBA1C MFR BLD: 5.1 % (ref 0–5.6)
HCT VFR BLD AUTO: 33.7 % (ref 35–47)
HGB BLD-MCNC: 11.7 G/DL (ref 11.7–15.7)
MCH RBC QN AUTO: 34.2 PG (ref 26.5–33)
MCHC RBC AUTO-ENTMCNC: 34.7 G/DL (ref 31.5–36.5)
MCV RBC AUTO: 99 FL (ref 78–100)
PLATELET # BLD AUTO: 396 10E3/UL (ref 150–450)
RBC # BLD AUTO: 3.42 10E6/UL (ref 3.8–5.2)
WBC # BLD AUTO: 6.5 10E3/UL (ref 4–11)

## 2022-07-25 PROCEDURE — 80053 COMPREHEN METABOLIC PANEL: CPT

## 2022-07-25 PROCEDURE — 85027 COMPLETE CBC AUTOMATED: CPT

## 2022-07-25 PROCEDURE — 84443 ASSAY THYROID STIM HORMONE: CPT

## 2022-07-25 PROCEDURE — 99204 OFFICE O/P NEW MOD 45 MIN: CPT

## 2022-07-25 PROCEDURE — 83036 HEMOGLOBIN GLYCOSYLATED A1C: CPT

## 2022-07-25 PROCEDURE — 36415 COLL VENOUS BLD VENIPUNCTURE: CPT

## 2022-07-25 PROCEDURE — 83880 ASSAY OF NATRIURETIC PEPTIDE: CPT

## 2022-07-25 RX ORDER — FUROSEMIDE 20 MG
20 TABLET ORAL DAILY
Qty: 30 TABLET | Refills: 0 | Status: SHIPPED | OUTPATIENT
Start: 2022-07-25 | End: 2023-02-17

## 2022-07-25 ASSESSMENT — ENCOUNTER SYMPTOMS
UNEXPECTED WEIGHT CHANGE: 1
NUMBNESS: 1
WEAKNESS: 1
SHORTNESS OF BREATH: 0
CHEST TIGHTNESS: 1
PARESTHESIAS: 1
NERVOUS/ANXIOUS: 1

## 2022-07-25 NOTE — ASSESSMENT & PLAN NOTE
She has tried using compression stockings without any relief. Also c/o sharp pain in her feet and paresthesias along the medial and lateral sides of her calves. She is up ~20lbs in weight. Her weight upon admission was 194lbs and today she is 215lbs. 2-3+ edema in BLE. Legs are taut and shiny. She was given lasix in the hospital which helped with some of her swelling but she was not given a prescription for Lasix upon discharge. No erythema appreciated in BLE. She was diagnosed with a RLE DVT upon admission on 7/7/22. With edema present bilaterally and pt being on Xarelto, I have a very low suspicion that this is a DVT in the other leg. More likely her swelling is due to peripheral arterial disease, heart disease or potential pulmonary HTN (which was seen on past echo performed 1 year ago). We will perform a thorough cardiac workup to r/o cardiac etiology as well as check liver enzymes and thyroid function to r/o any common etiologies of peripheral edema. With her c/o chest pain in the past coupled with the fact she stated she has had occasional chest tightness over the past few weeks, I would like her to have a stress test done if she is willing. She is at very high risk for CVD with her uncontrolled HTN, obesity, hx of alcoholism and hx of DM.     Plan:   -CBC  -CMP  -BNP  -TSH  -ECHO  -Cardiac Stress Test  -Lymphedema consult  -OT consult (pt has asked forms to be signed for disability) would like OT's opinion on whether or not she is physically capable of working or not.

## 2022-07-25 NOTE — ASSESSMENT & PLAN NOTE
Although DM is on pt's history, it doesn't appear as though she has a HgbA1c on file that was >6%. A1C checked today was 5.1%. Thus, pt is not considered a diabetic at this time.

## 2022-07-25 NOTE — PROGRESS NOTES
Assessment & Plan     (I10) Essential hypertension  (primary encounter diagnosis)  Comment: BP elevated today in clinic at 166/102. Pt notes she has not taken her medication yet today.   Plan: Will not adjust any BP medications today as pt is already prescribed Losartan 100MG and Coreg 25MG BID. Would like her to f/u in clinic for BP recheck after taking her medications in the A.M.             (E66.01) Morbid obesity (H)  Comment: Pt notes she has not been participating in any physical activity as of late d/t BLE edema. We discussed importance of walking with edema to help prevent continued swelling.     (R60.9) Edema, unspecified type  Pt c/o edema and pain in BLE. She was given compression stockings upon discharge but has not found any relief with these. Also c/o sharp pain in her feet and paresthesias along the medial and lateral sides of her calves. She is up ~20lbs in weight. Her weight upon admission was 194lbs on 7/7 and today (7/25) she is 215lbs. 2-3+ edema in BLE. Legs are taut and shiny. She was given lasix in the hospital which helped with some of her swelling but she was not given a prescription for Lasix upon discharge. No erythema appreciated in BLE. She was diagnosed with a RLE DVT upon admission on 7/7/22. With edema present bilaterally and pt being on Xarelto, I have a very low suspicion that this is a DVT in the other leg. More likely her swelling is due to peripheral arterial disease, heart disease or potential pulmonary HTN (which was seen on past echo performed 1 year ago). We will perform a thorough cardiac workup to r/o cardiac etiology as well as check liver enzymes and thyroid function to r/o any common etiologies of peripheral edema. With her c/o chest pain in the past coupled with the fact she stated she has had occasional chest tightness over the past few weeks, I would like her to have a stress test done if she is willing. She is at very high risk for CVD with her uncontrolled HTN,  "obesity, hx of tobacco and alcohol use and potential hx of DM.     Plan:   -CBC  -CMP  -BNP  -TSH  -ECHO  -Cardiac Stress Test  -Lymphedema consult  -OT consult (pt has asked forms to be signed for disability) would like OT's opinion on whether or not she is physically capable of working or not.   -Will also prescribe Potassium 20mg with lasix to prevent hypokalemia     -I have sent a prescription for 20mg of lasix and have advised patient to try using it every other day. If she is not having any relief, she can increase to every day. I have not sent any refills because I would like her to follow up closely with me. She has been advised to schedule appointments for Stress test, lymphedema and the ECHO and then see me back in clinic in 3 weeks.       (I82.411) Acute deep vein thrombosis (DVT) of femoral vein of right lower extremity (H)  Comment: DVT found during hospital admission on 7/7/22. Started on 15MG Xarelto BID x3 weeks and then 20mg once daily. Advised to take for 3-6 months (until 10/7/22-1/7/23).   -Pt was given 1 prescription of Xarelto upon discharge, will need renewal of 15MG around August 15th.   Plan: If patient continues to follow up with me, I will plan to order US in October to assess for resolution of clot.       (R73.09) Elevated glucose  Comment: It appears as though pt has hx of Type II DM but I do not see an A1C in her chart >6.5%. I will update A1C today.   Plan: HEMOGLOBIN A1C            50 minutes spent on the date of the encounter doing chart review, history and exam, documentation and further activities per the note       BMI:   Estimated body mass index is 38.7 kg/m  as calculated from the following:    Height as of this encounter: 1.588 m (5' 2.5\").    Weight as of this encounter: 97.5 kg (215 lb).   Weight management plan: Discussed healthy diet and exercise guidelines    No follow-ups on file.    ARMAAN [facility]       Nicotine/Tobacco " Cessation:  She reports that she has been smoking. She has never used smokeless tobacco.  Nicotine/Tobacco Cessation Plan:   Will discuss tobacco cessation plan at next visit.      Subjective   Arelis is a 51 year old presenting for the following health issues:  RECHECK (ED )    Pt has complex medical history and unfortunately has been lost to follow up over many years and has not had a PCP. She has struggled to find stable housing over the past few years and has to rely on friends or public transport to get to appointments, work, etc. She was referred to SW during hospital admission for assistance w/ coordination of community resources.  Her biggest concern today is the swelling in her legs which she believes has gotten worse since her hospital admission.       HPI       Hospital Follow-up Visit:    Hospital/Nursing Home/IP Rehab Facility: [facility]  Date of Admission: 7/7/22  Date of Discharge: 7/8/22  Reason(s) for Admission: DVT     Was your hospitalization related to COVID-19? No   Problems taking medications regularly:  None  Medication changes since discharge: Xarelto for 3-6 months   Problems adhering to non-medication therapy:  None    Summary of hospitalization:  [facility] discharge summary reviewed  Diagnostic Tests/Treatments reviewed.  Follow up needed: BMP, CBC and BP check  Other Healthcare Providers Involved in Patient s Care:         Care Coordination  Update since discharge: fluctuating course.       Post Medication Reconciliation Status:        Plan of care communicated with patient           Review of Systems   Constitutional: Positive for unexpected weight change.   Respiratory: Positive for chest tightness. Negative for shortness of breath.    Cardiovascular: Positive for peripheral edema.   Neurological: Positive for weakness, numbness and paresthesias.   Psychiatric/Behavioral: The patient is nervous/anxious.           Objective    BP (!) 164/100    "Pulse 89   Temp 97.8  F (36.6  C) (Oral)   Resp 20   Ht 1.588 m (5' 2.5\")   Wt 97.5 kg (215 lb)   LMP  (LMP Unknown)   SpO2 99%   BMI 38.70 kg/m    Body mass index is 38.7 kg/m .  Physical Exam  Constitutional:       Appearance: Normal appearance.   HENT:      Head: Normocephalic and atraumatic.   Eyes:      Conjunctiva/sclera: Conjunctivae normal.   Cardiovascular:      Rate and Rhythm: Normal rate and regular rhythm.      Heart sounds: Normal heart sounds.   Pulmonary:      Effort: Pulmonary effort is normal. No respiratory distress.      Breath sounds: Normal breath sounds.   Abdominal:      General: Abdomen is flat. Bowel sounds are normal.      Palpations: Abdomen is soft.   Musculoskeletal:      Right lower leg: Edema present.      Left lower leg: Edema present.   Skin:     General: Skin is warm and dry.      Findings: No erythema, lesion or rash.      Comments: Pretibial edema 3+ bilaterally. Painful upon palpation of legs. No open sores or lesions on legs. No erythema appreciated. Legs appear very taut and shiny.    Neurological:      Mental Status: She is alert.   Psychiatric:         Mood and Affect: Affect is tearful.         Speech: Speech normal.         Behavior: Behavior is cooperative.         Thought Content: Thought content normal.       "

## 2022-07-25 NOTE — ASSESSMENT & PLAN NOTE
Acute DVT 7/7/22. Started on Xarelto for 3-6 months. Xarelto BID 20mg. If pt continues to f/u with me, we will assess for clot resolution around 3-4 months and can then consider discontinuing medication at that time.

## 2022-07-26 LAB — NT-PROBNP SERPL-MCNC: 251 PG/ML (ref 0–125)

## 2022-07-27 LAB
ALBUMIN SERPL-MCNC: 3.1 G/DL (ref 3.4–5)
ALP SERPL-CCNC: 69 U/L (ref 40–150)
ALT SERPL W P-5'-P-CCNC: 8 U/L (ref 0–50)
ANION GAP SERPL CALCULATED.3IONS-SCNC: 5 MMOL/L (ref 3–14)
AST SERPL W P-5'-P-CCNC: 13 U/L (ref 0–45)
BILIRUB SERPL-MCNC: 0.2 MG/DL (ref 0.2–1.3)
BUN SERPL-MCNC: 13 MG/DL (ref 7–30)
CALCIUM SERPL-MCNC: 9.5 MG/DL (ref 8.5–10.1)
CHLORIDE BLD-SCNC: 108 MMOL/L (ref 94–109)
CO2 SERPL-SCNC: 26 MMOL/L (ref 20–32)
CREAT SERPL-MCNC: 0.59 MG/DL (ref 0.52–1.04)
GFR SERPL CREATININE-BSD FRML MDRD: >90 ML/MIN/1.73M2
GLUCOSE BLD-MCNC: 77 MG/DL (ref 70–99)
POTASSIUM BLD-SCNC: 4 MMOL/L (ref 3.4–5.3)
PROT SERPL-MCNC: 7.3 G/DL (ref 6.8–8.8)
SODIUM SERPL-SCNC: 139 MMOL/L (ref 133–144)
TSH SERPL DL<=0.005 MIU/L-ACNC: 1.42 MU/L (ref 0.4–4)

## 2022-07-27 RX ORDER — POTASSIUM CHLORIDE 1500 MG/1
20 TABLET, EXTENDED RELEASE ORAL DAILY
Qty: 30 TABLET | Refills: 0 | Status: SHIPPED | OUTPATIENT
Start: 2022-07-27

## 2022-07-30 ENCOUNTER — HOSPITAL ENCOUNTER (EMERGENCY)
Facility: CLINIC | Age: 52
Discharge: HOME | End: 2022-07-31
Attending: EMERGENCY MEDICINE | Admitting: EMERGENCY MEDICINE
Payer: MEDICAID

## 2022-07-30 ENCOUNTER — TELEPHONE (OUTPATIENT)
Dept: BEHAVIORAL HEALTH | Facility: CLINIC | Age: 52
End: 2022-07-30

## 2022-07-30 DIAGNOSIS — R45.851 SUICIDAL IDEATION: ICD-10-CM

## 2022-07-30 DIAGNOSIS — F10.920 ALCOHOLIC INTOXICATION WITHOUT COMPLICATION (H): ICD-10-CM

## 2022-07-30 LAB
ALBUMIN SERPL BCG-MCNC: 3.8 G/DL (ref 3.5–5.2)
ALP SERPL-CCNC: 82 U/L (ref 35–104)
ALT SERPL W P-5'-P-CCNC: 9 U/L (ref 10–35)
AMPHETAMINES UR QL SCN: NORMAL
ANION GAP SERPL CALCULATED.3IONS-SCNC: 12 MMOL/L (ref 7–15)
APAP SERPL-MCNC: 5 UG/ML (ref 10–30)
AST SERPL W P-5'-P-CCNC: 18 U/L (ref 10–35)
BARBITURATES UR QL SCN: NORMAL
BASOPHILS # BLD AUTO: 0 10E3/UL (ref 0–0.2)
BASOPHILS NFR BLD AUTO: 1 %
BENZODIAZ UR QL SCN: NORMAL
BILIRUB SERPL-MCNC: 0.2 MG/DL
BUN SERPL-MCNC: 16 MG/DL (ref 6–20)
BZE UR QL SCN: NORMAL
CALCIUM SERPL-MCNC: 9 MG/DL (ref 8.6–10)
CANNABINOIDS UR QL SCN: NORMAL
CHLORIDE SERPL-SCNC: 108 MMOL/L (ref 98–107)
CREAT SERPL-MCNC: 0.74 MG/DL (ref 0.51–0.95)
DEPRECATED HCO3 PLAS-SCNC: 24 MMOL/L (ref 22–29)
EOSINOPHIL # BLD AUTO: 0.2 10E3/UL (ref 0–0.7)
EOSINOPHIL NFR BLD AUTO: 3 %
ERYTHROCYTE [DISTWIDTH] IN BLOOD BY AUTOMATED COUNT: 17.3 % (ref 10–15)
ETHANOL SERPL-MCNC: 0.19 G/DL
FLUAV RNA SPEC QL NAA+PROBE: NEGATIVE
FLUBV RNA RESP QL NAA+PROBE: NEGATIVE
GFR SERPL CREATININE-BSD FRML MDRD: >90 ML/MIN/1.73M2
GLUCOSE SERPL-MCNC: 93 MG/DL (ref 70–99)
HCT VFR BLD AUTO: 34 % (ref 35–47)
HGB BLD-MCNC: 11.6 G/DL (ref 11.7–15.7)
HOLD SPECIMEN: NORMAL
HOLD SPECIMEN: NORMAL
IMM GRANULOCYTES # BLD: 0 10E3/UL
IMM GRANULOCYTES NFR BLD: 0 %
LYMPHOCYTES # BLD AUTO: 1.2 10E3/UL (ref 0.8–5.3)
LYMPHOCYTES NFR BLD AUTO: 23 %
MCH RBC QN AUTO: 33.2 PG (ref 26.5–33)
MCHC RBC AUTO-ENTMCNC: 34.1 G/DL (ref 31.5–36.5)
MCV RBC AUTO: 97 FL (ref 78–100)
MONOCYTES # BLD AUTO: 0.6 10E3/UL (ref 0–1.3)
MONOCYTES NFR BLD AUTO: 11 %
NEUTROPHILS # BLD AUTO: 3.2 10E3/UL (ref 1.6–8.3)
NEUTROPHILS NFR BLD AUTO: 62 %
NRBC # BLD AUTO: 0 10E3/UL
NRBC BLD AUTO-RTO: 0 /100
OPIATES UR QL SCN: NORMAL
PLATELET # BLD AUTO: 413 10E3/UL (ref 150–450)
POTASSIUM SERPL-SCNC: 3.5 MMOL/L (ref 3.4–5.3)
PROT SERPL-MCNC: 7.6 G/DL (ref 6.4–8.3)
RBC # BLD AUTO: 3.49 10E6/UL (ref 3.8–5.2)
RSV RNA SPEC NAA+PROBE: NEGATIVE
SALICYLATES SERPL-MCNC: <0.5 MG/DL
SARS-COV-2 RNA RESP QL NAA+PROBE: NEGATIVE
SODIUM SERPL-SCNC: 144 MMOL/L (ref 136–145)
WBC # BLD AUTO: 5.2 10E3/UL (ref 4–11)

## 2022-07-30 PROCEDURE — 80179 DRUG ASSAY SALICYLATE: CPT | Performed by: EMERGENCY MEDICINE

## 2022-07-30 PROCEDURE — 99285 EMERGENCY DEPT VISIT HI MDM: CPT | Mod: 25

## 2022-07-30 PROCEDURE — 80307 DRUG TEST PRSMV CHEM ANLYZR: CPT | Performed by: EMERGENCY MEDICINE

## 2022-07-30 PROCEDURE — 250N000013 HC RX MED GY IP 250 OP 250 PS 637: Performed by: EMERGENCY MEDICINE

## 2022-07-30 PROCEDURE — 85025 COMPLETE CBC W/AUTO DIFF WBC: CPT | Performed by: EMERGENCY MEDICINE

## 2022-07-30 PROCEDURE — 93005 ELECTROCARDIOGRAM TRACING: CPT

## 2022-07-30 PROCEDURE — 80143 DRUG ASSAY ACETAMINOPHEN: CPT | Performed by: EMERGENCY MEDICINE

## 2022-07-30 PROCEDURE — C9803 HOPD COVID-19 SPEC COLLECT: HCPCS

## 2022-07-30 PROCEDURE — 82077 ASSAY SPEC XCP UR&BREATH IA: CPT | Performed by: EMERGENCY MEDICINE

## 2022-07-30 PROCEDURE — 90791 PSYCH DIAGNOSTIC EVALUATION: CPT

## 2022-07-30 PROCEDURE — 87637 SARSCOV2&INF A&B&RSV AMP PRB: CPT | Performed by: EMERGENCY MEDICINE

## 2022-07-30 PROCEDURE — 80053 COMPREHEN METABOLIC PANEL: CPT | Performed by: EMERGENCY MEDICINE

## 2022-07-30 PROCEDURE — 36415 COLL VENOUS BLD VENIPUNCTURE: CPT | Performed by: EMERGENCY MEDICINE

## 2022-07-30 RX ORDER — CARVEDILOL 25 MG/1
25 TABLET ORAL 2 TIMES DAILY WITH MEALS
Status: DISCONTINUED | OUTPATIENT
Start: 2022-07-31 | End: 2022-07-31 | Stop reason: HOSPADM

## 2022-07-30 RX ORDER — HYDROXYZINE HYDROCHLORIDE 25 MG/1
25 TABLET, FILM COATED ORAL
Status: DISCONTINUED | OUTPATIENT
Start: 2022-07-30 | End: 2022-07-31 | Stop reason: HOSPADM

## 2022-07-30 RX ORDER — LOSARTAN POTASSIUM 100 MG/1
100 TABLET ORAL DAILY
Status: DISCONTINUED | OUTPATIENT
Start: 2022-07-31 | End: 2022-07-31 | Stop reason: HOSPADM

## 2022-07-30 RX ORDER — POTASSIUM CHLORIDE 1500 MG/1
20 TABLET, EXTENDED RELEASE ORAL ONCE
Status: COMPLETED | OUTPATIENT
Start: 2022-07-30 | End: 2022-07-31

## 2022-07-30 RX ORDER — LORAZEPAM 1 MG/1
1 TABLET ORAL
Status: COMPLETED | OUTPATIENT
Start: 2022-07-30 | End: 2022-07-30

## 2022-07-30 RX ORDER — OLANZAPINE 5 MG/1
5 TABLET ORAL
Status: DISCONTINUED | OUTPATIENT
Start: 2022-07-30 | End: 2022-07-30

## 2022-07-30 RX ORDER — OLANZAPINE 5 MG/1
10 TABLET, ORALLY DISINTEGRATING ORAL 2 TIMES DAILY PRN
Status: DISCONTINUED | OUTPATIENT
Start: 2022-07-30 | End: 2022-07-31 | Stop reason: HOSPADM

## 2022-07-30 RX ORDER — ACETAMINOPHEN 325 MG/1
650 TABLET ORAL EVERY 4 HOURS PRN
Status: DISCONTINUED | OUTPATIENT
Start: 2022-07-30 | End: 2022-07-31 | Stop reason: HOSPADM

## 2022-07-30 RX ORDER — FUROSEMIDE 20 MG
20 TABLET ORAL DAILY
Status: DISCONTINUED | OUTPATIENT
Start: 2022-07-31 | End: 2022-07-31 | Stop reason: HOSPADM

## 2022-07-30 RX ORDER — LANOLIN ALCOHOL/MO/W.PET/CERES
3 CREAM (GRAM) TOPICAL
Status: DISCONTINUED | OUTPATIENT
Start: 2022-07-30 | End: 2022-07-31 | Stop reason: HOSPADM

## 2022-07-30 RX ORDER — AMLODIPINE BESYLATE 5 MG/1
10 TABLET ORAL ONCE
Status: COMPLETED | OUTPATIENT
Start: 2022-07-30 | End: 2022-07-30

## 2022-07-30 RX ORDER — MAGNESIUM OXIDE 400 MG/1
400 TABLET ORAL DAILY
Status: DISCONTINUED | OUTPATIENT
Start: 2022-07-31 | End: 2022-07-31 | Stop reason: HOSPADM

## 2022-07-30 RX ADMIN — AMLODIPINE BESYLATE 10 MG: 5 TABLET ORAL at 18:35

## 2022-07-30 RX ADMIN — LORAZEPAM 1 MG: 1 TABLET ORAL at 12:08

## 2022-07-30 NOTE — ED NOTES
"Patient states she took an extra dose of all of her medications this morning. She states she is homeless and just lost her job. She stated \"If I had a gun right now I would use it\".   "

## 2022-07-30 NOTE — PHARMACY-ADMISSION MEDICATION HISTORY
Admission medication history interview status for this patient is complete. See [facility] admission navigator for allergy information, prior to admission medications and immunization status.     Medication history interview done, indicate source(s): Patient  Medication history resources (including written lists, pill bottles, clinic record):None  Pharmacy: -    Changes made to PTA medication list:  Added: -  Changed: -  Reported as Not Taking: -  Removed: -    Actions taken by pharmacist (provider contacted, etc):None     Additional medication history information:Xarelto - currently taking 15 mg BID. Has not started taking the 20 mg daily    Medication reconciliation/reorder completed by provider prior to medication history?  no   (Y/N)     For patients on insulin therapy:   Do you use sliding scale insulin based on blood sugars?   What is your pre-meal insulin coverage?    Do you typically eat three meals a day?   How many times do you check your blood glucose per day?   How many episodes of hypoglycemia do you typically have per month?   Do you have a Continuous Glucose Monitor (CGM)?      Prior to Admission medications    Medication Sig Last Dose Taking? Auth Provider Long Term End Date   acetaminophen (TYLENOL) 500 MG tablet Take 1-2 tablets (500-1,000 mg) by mouth every 6 hours as needed for mild pain  Yes Elizabeth Ann PA-C     carvedilol (COREG) 25 MG tablet Take 1 tablet (25 mg) by mouth 2 times daily (with meals) 7/30/2022 at Unknown time Yes Leatha Edward PA-C Yes    furosemide (LASIX) 20 MG tablet Take 1 tablet (20 mg) by mouth daily 7/30/2022 at Unknown time Yes Arlene Welsh, APRN CNP Yes    losartan (COZAAR) 100 MG tablet Take 1 tablet (100 mg) by mouth daily 7/30/2022 at Unknown time Yes Leatha Edward PA-C Yes    magnesium oxide (MAG-OX) 400 MG tablet Take 1 tablet (400 mg) by mouth daily 7/30/2022 at Unknown time Yes Austin Quiros MD     methocarbamol (ROBAXIN) 500 MG tablet Take  1 tablet (500 mg) by mouth 3 times daily as needed for other (leg pain) If tylenol is ineffective  Yes Leatha Edward PA-C     potassium chloride ER (K-TAB) 20 MEQ CR tablet Take 1 tablet (20 mEq) by mouth daily 7/30/2022 at Unknown time Yes Arlene Welsh APRN CNP     rivaroxaban ANTICOAGULANT (XARELTO) 20 MG TABS tablet 15 mg twice daily with food for 3 weeks, then 20 mg once daily with food (NOTE: will need 42 15 mg tablets the first time this is filled) 7/30/2022 at x1 Yes Leatha Edward PA-C Yes    rivaroxaban ANTICOAGULANT (XARELTO) 20 MG TABS tablet Take 1 tablet (20 mg) by mouth daily (with dinner) NOT started  Arlene Welsh APRN CNP Yes

## 2022-07-30 NOTE — ED PROVIDER NOTES
History     Chief Complaint:  Suicidal       HPI   Jose Corral is a 51 year old female who presents with patient presents emergency department with depression, suicidal thoughts, potential overdose patient reports that she is currently homeless.  She was asked to leave the place she was staying.  She is intermittent in her questioning although she said this morning she thought she was going to leave the earth.  She said she took extra of her prescribed medications because she thought she was going to leave the earth.  When questioned it sounds like she took 1 extra but she is unclear about which pills she took extra of.  Later on in questioning she says she took only her normal prescribed amounts.  She is anxious and weepy.  She seems an unreliable historian right now    ROS:  Review of Systems   Unable to perform ROS: Psychiatric disorder          Allergies:  No Known Allergies     Medications:    acetaminophen (TYLENOL) 500 MG tablet  carvedilol (COREG) 25 MG tablet  furosemide (LASIX) 20 MG tablet  losartan (COZAAR) 100 MG tablet  magnesium oxide (MAG-OX) 400 MG tablet  methocarbamol (ROBAXIN) 500 MG tablet  potassium chloride ER (K-TAB) 20 MEQ CR tablet  rivaroxaban ANTICOAGULANT (XARELTO) 20 MG TABS tablet  rivaroxaban ANTICOAGULANT (XARELTO) 20 MG TABS tablet        Past Medical History:    Past Medical History:   Diagnosis Date     Alcohol abuse      Diabetes (H)        Past Surgical History:    No past surgical history on file.     Family History:    family history includes Cerebrovascular Disease in her paternal grandmother; Diabetes in her mother and paternal grandmother; Heart Disease in her mother; Unknown/Adopted in her father.    Social History:   reports that she has been smoking. She has never used smokeless tobacco. She reports current alcohol use. She reports previous drug use.  PCP: No Ref-Primary, Physician     Physical Exam     Patient Vitals for the past 24 hrs:   BP Temp Temp src  Pulse Resp SpO2 Weight   07/30/22 1058 (!) 172/100 98.1  F (36.7  C) Oral 100 18 96 % 97.9 kg (215 lb 12.8 oz)        Physical Exam  Gen: Crying intermittently  Oral : Mucous membranes moist,   Nose: No rhinorhea  Ears: External near normal, without drainage  Eyes: periorbital tissues and sclera normal   Neck: supple, no abnormal swelling  Lungs: Clear bilaterally, no tachypnea or distress, speaks full sentences  CV: Regular rate, regular rhythm  Abd: soft, nontender, nondistended, no rebound/guarding  Ext: Symmetric lower extremity edema  Skin: warm, dry, well perfused, no rashes/bruising/lesions on exposed skin  Neuro: alert, no gross motor or sensory deficits,   Psych: Depressed, weepy at times      Emergency Department Course   ECG:  ECG results from 07/07/22   EKG 12-lead, tracing only     Value    Systolic Blood Pressure     Diastolic Blood Pressure     Ventricular Rate 96    Atrial Rate 96    VA Interval 140    QRS Duration 82        QTc 540    P Axis 41    R AXIS 16    T Axis 25    Interpretation ECG      Sinus rhythm  Moderate voltage criteria for LVH, may be normal variant  Nonspecific T wave abnormality  Prolonged QT  Abnormal ECG  When compared with ECG of 18-APR-2022 07:54,  QT has lengthened  Confirmed by - EMERGENCY ROOM, PHYSICIAN ([id number]),  RASHMI JUAREZ (Rm) on 7/7/2022 11:25:41 AM         Imaging:  No orders to display          Laboratory:  Labs Ordered and Resulted from Time of ED Arrival to Time of ED Departure   COMPREHENSIVE METABOLIC PANEL - Abnormal       Result Value    Sodium 144      Potassium 3.5      Creatinine 0.74      Urea Nitrogen 16.0      Chloride 108 (*)     Carbon Dioxide (CO2) 24      Anion Gap 12      Glucose 93      Calcium 9.0      Protein Total 7.6      Albumin 3.8      Bilirubin Total 0.2      Alkaline Phosphatase 82      AST 18      ALT 9 (*)     GFR Estimate >90     ACETAMINOPHEN LEVEL - Abnormal    Acetaminophen 5.0 (*)    ETHYL ALCOHOL LEVEL - Abnormal     Alcohol ethyl 0.19 (*)    CBC WITH PLATELETS AND DIFFERENTIAL - Abnormal    WBC Count 5.2      RBC Count 3.49 (*)     Hemoglobin 11.6 (*)     Hematocrit 34.0 (*)     MCV 97      MCH 33.2 (*)     MCHC 34.1      RDW 17.3 (*)     Platelet Count 413      % Neutrophils 62      % Lymphocytes 23      % Monocytes 11      % Eosinophils 3      % Basophils 1      % Immature Granulocytes 0      NRBCs per 100 WBC 0      Absolute Neutrophils 3.2      Absolute Lymphocytes 1.2      Absolute Monocytes 0.6      Absolute Eosinophils 0.2      Absolute Basophils 0.0      Absolute Immature Granulocytes 0.0      Absolute NRBCs 0.0     SALICYLATE LEVEL - Normal    Salicylate <0.5          Procedures       Emergency Department Course:             Reviewed:  I reviewed nursing notes and vitals    Assessments:        Consults:       Interventions:  Medications   LORazepam (ATIVAN) tablet 1 mg (1 mg Oral Given 7/30/22 1208)        Disposition:  PEnding DEC assessment    Impression & Plan        Medical Decision Making:  Patient presents emergency department with suicidal thoughts.  She is a little bit unclear on whether or not she took an overdose this morning or not.  She certainly does not seem to be under the evidence of any toxidrome in the several hours I observed her in the emergency department.  Tylenol salicylate negative EKG reassuring.  Slight hypertension but that is actually reassuring given that she has multiple antihypertensive she could have taken in an overdose.  Ethanol positive, she is sad and weepy expresses suicidal thoughts and intent.  We will plan on getting a full DEC assessment.  I feel she is medically clear at this point.  Placed her on an GISEL hold until able to complete the assessment.  We will sign her out to my partner to follow-up on that.      Diagnosis:    ICD-10-CM    1. Alcoholic intoxication without complication (H)  F10.920    2. Suicidal ideation  R45.851         Discharge Medications:  New Prescriptions     No medications on file        7/30/2022   John Carmichael,*        John Carmichael MD  07/30/22 3631

## 2022-07-30 NOTE — ED TRIAGE NOTES
"Patient states she was asked to leave the place she has been staying so she took some \"pills\" patient states she is feeling suicidal. Patient was noted to be talking to herself in triage and in the lobby. While in triage room patient continued to make random statements.      Triage Assessment     Row Name 07/30/22 1057       Triage Assessment (Adult)    Airway WDL WDL       Respiratory WDL    Respiratory WDL WDL       Skin Circulation/Temperature WDL    Skin Circulation/Temperature WDL WDL       Cardiac WDL    Cardiac WDL WDL       Peripheral/Neurovascular WDL    Peripheral Neurovascular WDL WDL       Cognitive/Neuro/Behavioral WDL    Cognitive/Neuro/Behavioral WDL WDL              "

## 2022-07-30 NOTE — TELEPHONE ENCOUNTER
S: [facility], 51/F, SA     B:  reports the pt comes in reporting taking extra BP medication in an SA today, waivers a bit, unable to contract for safety outside of the hospital    reports the pt has alc on board when she arrived   Hx of dep, alc dep and anx    reports the pt is tearful and reports she would attempt again if released   Homeless     Pt denies HI, aggression, psychosis     Medically cleared, eating, drinking, ambulating indep  Patient cleared and ready for behavioral bed placement: Yes   No covid test ordered     A: Voluntary , GISEL    R: Pt placed on work list until appropriate placement is available      340pm - Intake called ED, requested covid test be ordered and collected, RN to check w pt and call back re: placement preferences   357pm - RN called, [place], does not want to be reviewed at [facility] she is willing. Reports the pt might be on a 72 HH, will have to talk to the MD about it.
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
## 2022-07-31 ENCOUNTER — TELEPHONE (OUTPATIENT)
Dept: BEHAVIORAL HEALTH | Facility: CLINIC | Age: 52
End: 2022-07-31

## 2022-07-31 ENCOUNTER — HOSPITAL ENCOUNTER (INPATIENT)
Age: 52
End: 2022-07-31
Attending: PSYCHIATRY & NEUROLOGY
Payer: MEDICAID

## 2022-07-31 VITALS
WEIGHT: 215.8 LBS | RESPIRATION RATE: 16 BRPM | TEMPERATURE: 98.1 F | BODY MASS INDEX: 38.84 KG/M2 | SYSTOLIC BLOOD PRESSURE: 190 MMHG | HEART RATE: 83 BPM | DIASTOLIC BLOOD PRESSURE: 115 MMHG | OXYGEN SATURATION: 98 %

## 2022-07-31 PROCEDURE — 250N000013 HC RX MED GY IP 250 OP 250 PS 637: Performed by: EMERGENCY MEDICINE

## 2022-07-31 RX ORDER — ACETAMINOPHEN 325 MG/1
650 TABLET ORAL EVERY 4 HOURS PRN
Status: CANCELLED | OUTPATIENT
Start: 2022-07-31

## 2022-07-31 RX ORDER — CARVEDILOL 6.25 MG/1
25 TABLET ORAL 2 TIMES DAILY WITH MEALS
Status: DISCONTINUED | OUTPATIENT
Start: 2022-07-31 | End: 2022-07-31

## 2022-07-31 RX ORDER — MAGNESIUM OXIDE 400 MG/1
400 TABLET ORAL DAILY
Status: DISCONTINUED | OUTPATIENT
Start: 2022-07-31 | End: 2022-07-31

## 2022-07-31 RX ORDER — OLANZAPINE 5 MG/1
10 TABLET ORAL 3 TIMES DAILY PRN
Status: CANCELLED | OUTPATIENT
Start: 2022-07-31

## 2022-07-31 RX ORDER — LOSARTAN POTASSIUM 100 MG/1
100 TABLET ORAL DAILY
Status: DISCONTINUED | OUTPATIENT
Start: 2022-07-31 | End: 2022-07-31

## 2022-07-31 RX ORDER — FUROSEMIDE 20 MG
20 TABLET ORAL DAILY
Status: DISCONTINUED | OUTPATIENT
Start: 2022-07-31 | End: 2022-07-31

## 2022-07-31 RX ORDER — OLANZAPINE 10 MG/2ML
10 INJECTION, POWDER, FOR SOLUTION INTRAMUSCULAR 3 TIMES DAILY PRN
Status: CANCELLED | OUTPATIENT
Start: 2022-07-31

## 2022-07-31 RX ORDER — METHOCARBAMOL 500 MG/1
500 TABLET, FILM COATED ORAL 3 TIMES DAILY PRN
Status: DISCONTINUED | OUTPATIENT
Start: 2022-07-31 | End: 2022-07-31 | Stop reason: HOSPADM

## 2022-07-31 RX ORDER — HYDROXYZINE HYDROCHLORIDE 25 MG/1
25 TABLET, FILM COATED ORAL EVERY 4 HOURS PRN
Status: CANCELLED | OUTPATIENT
Start: 2022-07-31

## 2022-07-31 RX ORDER — POTASSIUM CHLORIDE 1500 MG/1
20 TABLET, EXTENDED RELEASE ORAL DAILY
Status: DISCONTINUED | OUTPATIENT
Start: 2022-07-31 | End: 2022-07-31 | Stop reason: HOSPADM

## 2022-07-31 RX ADMIN — LOSARTAN POTASSIUM 100 MG: 100 TABLET, FILM COATED ORAL at 08:12

## 2022-07-31 RX ADMIN — RIVAROXABAN 15 MG: 15 TABLET, FILM COATED ORAL at 08:12

## 2022-07-31 RX ADMIN — POTASSIUM CHLORIDE 20 MEQ: 1500 TABLET, EXTENDED RELEASE ORAL at 00:54

## 2022-07-31 RX ADMIN — Medication 400 MG: at 08:12

## 2022-07-31 RX ADMIN — CARVEDILOL 25 MG: 25 TABLET, FILM COATED ORAL at 08:12

## 2022-07-31 RX ADMIN — POTASSIUM CHLORIDE 20 MEQ: 1500 TABLET, EXTENDED RELEASE ORAL at 08:12

## 2022-07-31 RX ADMIN — FUROSEMIDE 20 MG: 20 TABLET ORAL at 08:12

## 2022-07-31 NOTE — ED NOTES
Patient resting in room, morning medications given without issue, breakfast tray ordered. Continues to be calm and cooperative.

## 2022-07-31 NOTE — TELEPHONE ENCOUNTER
Potential bed avail on 20   838am - [facility], on call resident, paged   841pm - [facility] called, will review and call back   850am - [facility] accepts     R: 20/Parmjit     Pt placed in queue   854am - unit charge notified, unable to take admission this shift d/t staffing, suggest the ED check in after shift change, 4pm   855am - ED notified         [facility] ED # [phone]  Station [address] # [phone]

## 2022-07-31 NOTE — TELEPHONE ENCOUNTER
R:     Hx of HTN, baseline to be at high re: 203/130 blood pressure.     12AM Bed Search Update: [facility].   [facility] is at capacity.   [facility] is at capacity.   [facility] is at capacity.   [facility] is at capacity.   [facility] is at capacity.   [facility] is at capacity.   [facility] is at capacity.    [facility] is at capacity.    [facility] posted 3 beds.  Low acuity only. Call [phone]; [facility] is able to review.  12:34pm-faxed clinicals to [facility].  Faxed [facility]'s Transfer forms to ED RN.   6:40am- [facility] never received the completed transfer forms,  Intake called to ED RN to follow up.  ED RN never received the forms from the [facility].  She will follow up.   6:49am- [facility] called to say that they never received the transfer forms.  [facility] provided a different fax# at [phone].  Intake faxed [facility] Transfer forms to [facility] ED.  [facility] called back to say she received it. RN will complete and fax to [facility].     [facility] posted 2 beds.  Low acuity only.      Pt remains on waitlist pending available bed.

## 2022-07-31 NOTE — TELEPHONE ENCOUNTER
920am - Dimitri from ext care, called reporting the pt is no longer in need of IP MH, is going to discharge from the ED   922am - [facility], on call resident, notifeid   Pt removed from queue   924am - unit notified

## 2022-07-31 NOTE — PROGRESS NOTES
"[facility] Crisis Reassessment      Jose Corral was reassessed at the request of [facility] ED for the following reasons: no longer endorsing suicidal ideation. Pt was first seen on 7/30/22 by Delilah Shah; see the initial assessment note for details.      Patient Presentation    Initial ED presentation details: presents with depressed mood, suicidal ideation and report of possible overdose.  Pt is homeless and was asked to leave the friend's home she was staying at yesterday.  Pt states she took extra dose of prescribed medication because she is \"tired of being on earth\" and thought it would take her pain away.    Current patient presentation: Patient is calm and appropriate. Patient identifies relapsing on alcohol which led to suicidal thoughts, but reports feeling better now that she is sober. Patient is future focused and goal oriented, denying any intent to harm herself.    Changes observed since initial assessment: Patient has sobered up, and had a decrease in suicidal ideation. Patient also has a sober friend who she can stay with. Patient additionally is no longer having thoughts of suicide, and expressed goals she wants to achieve in the future, which include getting a job, getting her own place to live, and maintaining sobriety.      Risk of Harm    Is the patient experiencing current suicidal ideation: No    Does the patient have thoughts of harming others? No      Mental Status Exam   Affect: Appropriate   Appearance: Appropriate    Attention Span/Concentration: Attentive?    Eye Contact: Variable   Fund of Knowledge: Appropriate    Language /Speech Content: Fluent   Language /Speech Volume: Normal    Language /Speech Rate/Productions: Articulate    Recent Memory: Intact   Remote Memory: Intact   Mood: Normal    Orientation to Person: Yes    Orientation to Place: Yes   Orientation to Time of Day: Yes    Orientation to Date: Yes    Situation (Do they understand why they are here?): Yes    Psychomotor " Behavior: Normal    Thought Content: Clear   Thought Form: Goal Directed       Additional Collateral Information   Patient has insight into what lead her to the hospital, and does have a sober friend she can stay with.      Therapeutic Intervention  The following therapeutic methodologies were employed when working with the patient: Establishing rapport, Assess dimensions of crisis and Safety planning. Patient response to intervention:engaged.      Disposition  Recommended disposition: Individual Therapy and Rule 25/SYD Assessment      Reviewed case and recommendations with attending provider. Attending Name: Dr. Smith     Attending concurs with disposition: Yes      Patient concurs with disposition: Yes      Final disposition: Individual therapy .       Clinical Substantiation of Recommendations  Patient is able to show insight into her recent ETOH use, and reports that she is now feeling safe, and has a safe place to go. Patient additionally reports willingness to stay sober, and engage in therapeutic resources. She is goal oriented, and future focused, wanting to become employed again so she can regain her own place to live. Patient additionally reports she has maintained some periods of sobriety on her own, and does not want SYD treatment, however was okay with resources. Patient additionally reports that she wants to live to make sure he son can have the best life possible.      Assessment Details  Total duration spent on the patient case in minutes: .75 hrs     CPT code(s) utilized: 04309 - Psychotherapy (with patient) - 30 (16-37*) min       Dimitri Jarrett       Aftercare Plan  If I am feeling unsafe or I am in a crisis, I will:   Contact my established care providers   Call the National Suicide Prevention Lifeline: 988  Go to the nearest emergency room   Call 911     Warning signs that I or other people might notice when a crisis is developing for me: Urges to use alcohol, Increased agitation,  anxiety, irritability, sadness/unhappiness, crying    Things I am able to do on my own to cope or help me feel better: watch 24, practice relaxation techniques, go to Jew. pray    Things that I am able to do with others to cope or help me better: attend Jew or AA meetings    Things I can use or do for distraction: listen to music, talk to friends, prayer, aa meetings, look for a job    Changes I can make to support my mental health and wellness: discontinue, or reduce alcohol consumption, and meet with mental health professional 1x every 2 weeks    People in my life that I can ask for help: friends    Your [facility] has a mental health crisis team you can call 24/7: [facility]  [phone]    Other things that are important when I m in crisis: getting a job, being there for my family    Additional resources and information:     Substance Abuse Resources    To access substance abuse treatment you must have an assessment complete or have an updated assessment within 30 days of starting any program.  Information for this to be completed and to secure funding if you have medical assistance or no insurance can be found through your [facility]'s [employer] intake line.    If you have private insurance, call the customer service number on the back of your insurance card to find an in-network provider for substance abuse assessment. The ideal provider will be a treatment facility, licensed in the [facility].    For those with Medicaid with the [facility], you will need a Rule 25 assessment: The following are phone numbers for each [facility]. Rule 25 assessments must be completed by the county you reside in currently. Once approved for funding you can connect with a facility that does Rule 25 assessment.  [place] - [phone]  [facility]  [phone]  [facility] [phone]  [facility] [phone]  [place] - [phone]  [facility] [phone]  [place] - [phone]  [place]  [phone]    The following  facilities offer Rule 25/chemical health assessments:    [facility]  [phone]  Mon-Friday: [address] [facility], [address]  Saturday:  [address] [facility], [address]  M-F assessments (7a-1:45p); Saturday assessments (7:45-10:45a)    [facility]  [phone]  [address] [place], [place], [address]  *by appointment only M-W; walk ins available Fridays from 10-2.    [facility]  [phone] (phone consultation available 24/7)  In-person Assessments:  [address] [facility] [address], [address], [place] [address]  [address] [place] [address]  [address] [place] [address]  [address] [place] [address]     [facility]  [phone]  [address]  [place], [place], [address]  *walk in and appointments available by calling    [facility]  [phone] [address] [place], [place], [address]  *by appointment only M-Th    [facility]  [phone]  [address] [place], [place], [address]  *walk ins available M-F    [facility]  [phone] [address] [place], [place], [address]  *available by appointments only      [facility]  [phone]  [address] [place], [place], [address]  *available by appointment only      [facility]  [phone]  [facility]  [address] [place], [place], [address]    [facility] ([facility])  [phone]  [address] [place] [address], [address]  *Walk in assessments also available M-F starting at 8 am.    [facility]  [phone]  [address] [place], [place], [address]    *available by appointments      [facility]  [phone]  [address] [place], [place], [address]  *walk in assessments available M-F starting at 7 am.    [facility]  [phone]  [facility]  [address],  [address]  *Walk in assessments availble M-F starting at 8 am OR [phone]    [facility]  [address] [place] [address]  *Walk in assessments available M-F starting at 8 am    [facility]  [phone]  [address] [place] [address]    [facility]  [phone]  [address] [place], [place], [address]    *available by appointment only    [facility] [place]  [phone]  [address]  [place], [address]    [facility]  [phone]  [address], [place], [address]  *available by appointment    [facility]  [phone]  [address] [place], [place], [address]  *walk ins available M-TH from 9-3    [facility]  [phone]  [address]  [place], [address]    [facility]  [phone]  [address] [place] [address]  [address] [address] [address]    [facility] (Does Rule 25 Assessments)  [email or web address]  [phone]  [address] [place] [address], [address], [place] [address]    [facility]  [email or web address]-[email or web address]-treatment  [phone], [address], [place] [address]  [phone], [address], [place], [place] [address] [phone], [address], [place], [place] [address] [phone], [address], ([facility]), [place] [address]  [phone], [address] [place] [address]      If you are intoxicated, you may be required to detox at a detox facility before starting treatment. The following are detox facilities that you can self present to. All detox facilities are able to help you complete an assessment/rule 25 prior to discharge if you choose:      [facility]: [address] [place], [place], [address].         [phone]    [facility]: [address] [facility],  [place], [address]  [phone]    [facility]: [address], [place], [place], [address] [phone]    [facility]: [address], [place], [place], [address]  [phone]       It is recommended that you abstain from all mood altering chemicals. Please contact the sober support hotline (847-363-1301) as needed; phones are answered 24 hours a day, 7 days a week. Other support hotlines include:    [facility] Mental Health & Addiction: 1-496.424.3656    [facility] Support Line: 1-552.929.7968       Ways to help cope with sobriety:    -- Take prescribed medicines as scheduled  -- Keep follow-up appointments  -- Talk to others about your concerns  -- Get regular exercise    -- Practice deep breathing skills  -- Eat a healthy diet  -- Use community resources, including hotline numbers, [facility] and support meetings  -- Stay sober and avoid places/people/things associated with substance use    --Maintain a daily schedule/routine    --Get at least 7-8 hours of sleep per night    --Create a list 10--20 healthy activities that you can do that are enjoyable and do not involve substance use    --Create daily goals (approx. 1-4 goals) per day and work to achieve them throughout the day.         [place] [facility] ([facility])  [facility] connects people seeking recovery to resources that help foster and sustain long-term recovery. Whether you are seeking resources for treatment, transportation, housing, job training, education, health care or other pathways to recovery, [facility] is a great place to start.    Phone: 636.364.8447. www.[place]DiObex.org (Great listing of all types of recovery and non-recovery related resources)    Alcoholics Anonymous    Phone: 1-800-ALCOHOL    Website: HTTP://WWW.AA.ORG/      AA [place] (976-968-4484 or http://aamin[place].org)    AA [place] (187-462-8245 or www.aastpaul.org)      Narcotics Anonymous    Phone: 245.670.1659    Website: www.[employer].eCareDiary.    [employer]  [address], [place], [place],    Phone: [phone]    Drop-in Hours: Monday-Friday 9-11:30 am. By appointment at other times.    Provides: [facility] is a drop-in center on the [facility] side [facility] that provides a safe space for individuals who are homeless and have a history of chemical use. Sobriety is not a requirement but drugs and alcohol are not allowed on the property.    Services: Non-clients can access drop-in services such as Recovery and Harm Reduction Groups, referrals to case management, community activities, shower facilities, and a pool table. Individuals who are homeless and have chemical health needs may be eligible for enrollment into [facility]'s case management program. Clients and  work together to access benefits, treatment, health care, shelter, and external housing resources.      [facility] Chemical Assessment & Referral Unit    [address] [place], [place]    Phone: [phone]    Hours: Monday-Friday 8 am-5 pm.    Provides: Rule 25 assessment and referral for individuals seeking treatment or counseling for chemical dependency. Must be a resident of [facility]. There is no fee for assessment. There is some funding available for treatment programs.      Tomás    [address] [facility] Phone: [phone] Main: [phone]    Hours: Monday through Friday 7 am-5 pm    Provides: Daily drop-in Rule 25 assessments and weekly mental health assessments and services. Outpatient chemical dependency treatment (with sober recovery housing), relapse prevention, case management, and employment services. Outpatient and residential treatment for women with children. Specializes in assisting individuals with a history of relapse who face multiple barriers to achieving stable recovery.    Remarks: No charge for most services or services can be billed through insurance. Gender-specific services and treatment for co-occurring  "substance abuse and mental health concerns offered.      Crisis Lines  Crisis Text Line  Text 286697  You will be connected with a trained live crisis counselor to provide support.    National Hope Line  1.800.SUICIDE [1120174]      Community Resources  Fast Tracker  Linking people to mental health and substance use disorder resources  [employer].org     [place] Mental Health Warm Line  Peer to peer support  Monday thru Saturday, 12 pm to 10 pm  040.149.0627 or 7.574.484.6827  Text \"Support\" to 81758    National [place] on Mental Illness (SUKUMAR)  185.497.1964 or 1.888.SUKUMAR.HELPS        Mental Health Apps  My3  https://Sprio.Cupple/    VirtualHopeBox  https://Everpix/apps/virtual-hope-box/    [facility] SCHEDULING:  Today you were seen by a licensed mental health professional through [facility] ([facility])  for a crisis assessment [facility].  It is recommended that you follow up with your estabished providers (psychiatrist, mental health therapist, and/or primary care doctor - as relevant) as soon as possible. Coordinators from [facility] will be calling you in the next 24-48 hours to ensure that you have the resources you need.  You can also contact [facility] coordinators directly at [phone].    You have been scheduled the following appointments: A transition Clinic Referral has been made for you. They will contact you tomorrow, and will assist you with psychotherapy until you can get to your next appointment scheduled on: 10/10/22 @ 5:00 PMPRADEEP [facility], [address], [facility], [phone]      [facility] maintains an extensive network of licensed behavioral health providers to connect patients with the services they need.  We do not charge providers a fee to participate in our referral network.  We match patients with providers based on a patient s specific needs, " insurance coverage, and location.  Our first effort will be to refer you to a provider within your care system, and will utilize providers outside your care system as needed.

## 2022-07-31 NOTE — ED NOTES
Patient changed over to my care from Dr. Carmichael.  Patient presented with acute alcohol intoxication and had concerns of suicidal ideation.  Patient metabolized the effects of alcohol but continues to endorse suicidal ideation and inability to contract for safety.  Patient was evaluated by DEC who agrees with inpatient psychiatric admission.  Patient is pending transfer to inpatient psychiatric bed when available.     Duong Camacho MD  07/30/22 4776

## 2022-07-31 NOTE — TELEPHONE ENCOUNTER
This writer made first attempt at reaching patient. Left message for the patient with information on the TC and asked for a return call to schedule      ----- Message from Abi Gunn sent at 7/31/2022  9:48 AM CDT -----  Regarding: TC Referral  Transition Clinic Referral   [place] Only   Limited [place] Availability    Type of Referral:    _X___Therapy Only: Does your patient require a same or next day appointment?:   ____Medication Only: Referral will automatically be declined if no next level of care scheduled. Suboxone and Opioid management referrals are automatically denied.  ____Therapy & Medication:  Referral will automatically be declined if no next level of care scheduled. Suboxone and Opioid management referrals are automatically denied.  ____Diagnostic Assessment     Suboxone and Opioid Management Referrals are Automatically Denied    TC Psychiatry cannot see patients who do not have active medical insurance    Referring Provider Name: Abi Gunn    Clinician completing the assessment. Delilah Shah    Referring Provider: [facility] ([facility]) CLINICIAN     If known, referring provider contact name: Dimitri Jarrett; Phone Number: [phone]  Service Line/Location: [facility]    Reason for Transition Clinic Referral: Bridging services until patient can start with scheduled providers    Next Level of Care Patient Will Be Transitioned To: Outpatient Therapy services  Provider(s)Richard Danielle and Bernie  TC Psychiatry cannot see patients who do not have active medical insurance    What Would Be Helpful from the Transition Clinic: N/A     Needs: NO    Does Patient Have Access to Technology: Yes    Patient E-mail Address: [email or web address]    Current Patient Phone Number: [phone]    Clinician Gender Preference (if applicable): NO    Abi Gunn

## 2022-07-31 NOTE — ED NOTES
Arelis was signed out to me from Dr. Hunt.  Please see initial ED evaluation for details of presentation.  In brief patient presented in the setting of acute alcohol intoxication with suicidal ideation.  Once clinically sober, she was denying any further thoughts of suicide and prefers to go home.  With this in mind, DEC reassessment was obtained.  They feel that she is currently safe for discharge with no red flags that would indicate that inpatient psychiatric care would be indicated.  Outpatient mental health assessment is set up for the patient with safety plan.  She feels comfortable to plan for discharge.  Discharged home in improved condition.    Disposition: To home in stable, improved condition    Diagnosis:    (F10.920) Alcoholic intoxication without complication (H)    (R45.851) Suicidal ideation       Vicky Smith MD  07/31/22 7010

## 2022-07-31 NOTE — ED NOTES
RN spoke with intake.  Potential placement within [facility] facilities.  Intake faxing paperwork and will update staff when able.

## 2022-07-31 NOTE — ED NOTES
[facility] ED Mental Health Handoff Note:       Brief HPI:  This is a 51 year old female signed out to me by Dr. Camacho.  See initial ED Provider note for full details of the presentation.     Home meds reviewed and ordered/administered: Yes    Medically stable for inpatient mental health admission: Yes.    Evaluated by mental health: Yes. The recommendation is for inpatient mental health treatment. Bed search in process    Safety concerns: At the time I received sign out, there were no safety concerns.    Hold Status:  Active Orders   Legal    Emergency Hospitalization Hold (72 Hr Hold)     Frequency: Effective Now     Start Date/Time: 07/30/22 2313      Number of Occurrences: Until Specified    Health Officer Authority to Detain (GISEL)     Frequency: Effective Now     Start Date/Time: 07/30/22 1117      Number of Occurrences: Until Specified     Order Comments: This patient presented with circumstances that have led me to be reasonably suspicious that the patient is at significant risk of self-harm. The patient's judgment to this situation appears to be impaired. Given the circumstances in which the patient presented, it is likely that the patient is at significant risk of attempting self harm if this situation is not investigated further. I am highly concerned that the patient is mentally ill and currently cannot safely care for oneself. This represents endangerment to the patient's well-being and safety, and I am placing a Health Officer Authority hold upon the patient at this time.           Exam:   Patient Vitals for the past 24 hrs:   BP Temp Temp src Pulse Resp SpO2 Weight   07/30/22 2014 (!) 203/130 -- -- 85 18 100 % --   07/30/22 1835 (!) 218/115 -- -- -- -- -- --   07/30/22 1732 (!) 200/118 -- -- 89 16 100 % --   07/30/22 1058 (!) 172/100 98.1  F (36.7  C) Oral 100 18 96 % 97.9 kg (215 lb 12.8 oz)         ED Course:    Medications   acetaminophen (TYLENOL) tablet 650 mg (has no administration in  time range)   OLANZapine zydis (zyPREXA) ODT tab 10 mg (has no administration in time range)   melatonin tablet 3 mg (has no administration in time range)   hydrOXYzine (ATARAX) tablet 25 mg (has no administration in time range)   carvedilol (COREG) tablet 25 mg (has no administration in time range)   furosemide (LASIX) tablet 20 mg (has no administration in time range)   magnesium oxide (MAG-OX) tablet 400 mg (has no administration in time range)   losartan (COZAAR) tablet 100 mg (has no administration in time range)   carvedilol (COREG) tablet 25 mg (has no administration in time range)   furosemide (LASIX) tablet 20 mg (has no administration in time range)   losartan (COZAAR) tablet 100 mg (has no administration in time range)   magnesium oxide (MAG-OX) tablet 400 mg (has no administration in time range)   methocarbamol (ROBAXIN) tablet 500 mg (has no administration in time range)   potassium chloride ER (K-TAB) TBCR 20 mEq (has no administration in time range)   rivaroxaban ANTICOAGULANT (XARELTO) tablet 15 mg (has no administration in time range)   LORazepam (ATIVAN) tablet 1 mg (1 mg Oral Given 7/30/22 1208)   amLODIPine (NORVASC) tablet 10 mg (10 mg Oral Given 7/30/22 1835)   potassium chloride ER (KLOR-CON M) CR tablet 20 mEq (20 mEq Oral Given 7/31/22 0054)            There were no significant events during my shift. Home meds ordered    Patient was signed out to the oncoming provider, Dr. Smith.      Impression:    ICD-10-CM    1. Alcoholic intoxication without complication (H)  F10.920    2. Suicidal ideation  R45.851        Plan:    1. Awaiting inpatient mental health admission/transfer.      RESULTS:   Results for orders placed or performed during the hospital encounter of 07/30/22 (from the past 24 hour(s))   CBC with platelets differential     Status: Abnormal    Collection Time: 07/30/22 12:06 PM    Narrative    The following orders were created for panel order CBC with platelets  differential.  Procedure                               Abnormality         Status                     ---------                               -----------         ------                     CBC with platelets and d...[[id number]]  Abnormal            Final result                 Please view results for these tests on the individual orders.   Comprehensive metabolic panel     Status: Abnormal    Collection Time: 07/30/22 12:06 PM   Result Value Ref Range    Sodium 144 136 - 145 mmol/L    Potassium 3.5 3.4 - 5.3 mmol/L    Creatinine 0.74 0.51 - 0.95 mg/dL    Urea Nitrogen 16.0 6.0 - 20.0 mg/dL    Chloride 108 (H) 98 - 107 mmol/L    Carbon Dioxide (CO2) 24 22 - 29 mmol/L    Anion Gap 12 7 - 15 mmol/L    Glucose 93 70 - 99 mg/dL    Calcium 9.0 8.6 - 10.0 mg/dL    Protein Total 7.6 6.4 - 8.3 g/dL    Albumin 3.8 3.5 - 5.2 g/dL    Bilirubin Total 0.2 <=1.2 mg/dL    Alkaline Phosphatase 82 35 - 104 U/L    AST 18 10 - 35 U/L    ALT 9 (L) 10 - 35 U/L    GFR Estimate >90 >60 mL/min/1.73m2   Acetaminophen level     Status: Abnormal    Collection Time: 07/30/22 12:06 PM   Result Value Ref Range    Acetaminophen 5.0 (L) 10.0 - 30.0 ug/mL   Salicylate level     Status: Normal    Collection Time: 07/30/22 12:06 PM   Result Value Ref Range    Salicylate <0.5   mg/dL   Alcohol level blood     Status: Abnormal    Collection Time: 07/30/22 12:06 PM   Result Value Ref Range    Alcohol ethyl 0.19 (H) <=0.00 g/dL   Extra Tube ([place] Draw)     Status: None    Collection Time: 07/30/22 12:06 PM    Narrative    The following orders were created for panel order Extra Tube ([place] Draw).  Procedure                               Abnormality         Status                     ---------                               -----------         ------                     Extra Blue Top Tube[[id number]]                              Final result                 Please view results for these tests on the individual orders.   Extra Tube ([place] Draw)      Status: None    Collection Time: 07/30/22 12:06 PM    Narrative    The following orders were created for panel order Extra Tube ([place] Draw).  Procedure                               Abnormality         Status                     ---------                               -----------         ------                     Extra Green Top (Lithium...[[id number]]                      Final result                 Please view results for these tests on the individual orders.   CBC with platelets and differential     Status: Abnormal    Collection Time: 07/30/22 12:06 PM   Result Value Ref Range    WBC Count 5.2 4.0 - 11.0 10e3/uL    RBC Count 3.49 (L) 3.80 - 5.20 10e6/uL    Hemoglobin 11.6 (L) 11.7 - 15.7 g/dL    Hematocrit 34.0 (L) 35.0 - 47.0 %    MCV 97 78 - 100 fL    MCH 33.2 (H) 26.5 - 33.0 pg    MCHC 34.1 31.5 - 36.5 g/dL    RDW 17.3 (H) 10.0 - 15.0 %    Platelet Count 413 150 - 450 10e3/uL    % Neutrophils 62 %    % Lymphocytes 23 %    % Monocytes 11 %    % Eosinophils 3 %    % Basophils 1 %    % Immature Granulocytes 0 %    NRBCs per 100 WBC 0 <1 /100    Absolute Neutrophils 3.2 1.6 - 8.3 10e3/uL    Absolute Lymphocytes 1.2 0.8 - 5.3 10e3/uL    Absolute Monocytes 0.6 0.0 - 1.3 10e3/uL    Absolute Eosinophils 0.2 0.0 - 0.7 10e3/uL    Absolute Basophils 0.0 0.0 - 0.2 10e3/uL    Absolute Immature Granulocytes 0.0 <=0.4 10e3/uL    Absolute NRBCs 0.0 10e3/uL   Extra Blue Top Tube     Status: None    Collection Time: 07/30/22 12:06 PM   Result Value Ref Range    Hold Specimen JIC    Extra Green Top ([place]ium Heparin) Tube     Status: None    Collection Time: 07/30/22 12:06 PM   Result Value Ref Range    Hold Specimen JIC    Asymptomatic Influenza A/B & SARS-CoV2 (COVID-19) Virus PCR Multiplex Nasopharyngeal     Status: Normal    Collection Time: 07/30/22  3:54 PM    Specimen: Nasopharyngeal; Swab   Result Value Ref Range    Influenza A PCR Negative Negative    Influenza B PCR Negative Negative    RSV PCR Negative Negative     SARS CoV2 PCR Negative Negative    Narrative    Testing was performed using the Xpert Xpress CoV2/Flu/RSV Assay on the [employer] GeneXpert Instrument. This test should be ordered for the detection of SARS-CoV-2 and influenza viruses in individuals who meet clinical and/or epidemiological criteria. Test performance is unknown in asymptomatic patients. This test is for in vitro diagnostic use under the FDA EUA for laboratories certified under CLIA to perform high or moderate complexity testing. This test has not been FDA cleared or approved. A negative result does not rule out the presence of PCR inhibitors in the specimen or target RNA in concentration below the limit of detection for the assay. If only one viral target is positive but coinfection with multiple targets is suspected, the sample should be re-tested with another FDA cleared, approved, or authorized test, if coinfection would change clinical management. This test was validated by the [facility]. These laboratories are certified under the Clinical  Laboratory Improvement Amendments of 1988 (CLIA-88) as qualified to perform high complexity laboratory testing.   Urine Drugs of Abuse Screen     Status: Normal    Collection Time: 07/30/22  4:55 PM    Narrative    The following orders were created for panel order Urine Drugs of Abuse Screen.  Procedure                               Abnormality         Status                     ---------                               -----------         ------                     Drug abuse screen 1 urin...[[id number]]  Normal              Final result                 Please view results for these tests on the individual orders.   Drug abuse screen 1 urine (ED)     Status: Normal    Collection Time: 07/30/22  4:55 PM   Result Value Ref Range    Amphetamines Urine Screen Negative Screen Negative    Barbituates Urine Screen Negative Screen Negative    Benzodiazepine Urine Screen Negative Screen Negative     Cannabinoids Urine Screen Negative Screen Negative    Cocaine Urine Screen Negative Screen Negative    Opiates Urine Screen Negative Screen Negative             DO Gilbert Betancur Bradley Joseph, DO  07/31/22 0455

## 2022-07-31 NOTE — TELEPHONE ENCOUNTER
Updated Bed Search @ 10pm:  Per chart review, intake can look in the metro area for placement     [facility] has 0 appropriate beds available. Phone: [phone]  [facility] posting 0 available beds. Phone: [phone]  [facility] posting 0 available beds. Phone: [phone]  [facility] posting 0 available beds. Phone: [phone]  [facility] posting 0 available beds. Phone: [phone]  [facility] posting 0 available beds. Phone:[phone]  [facility] posting 0 available beds. Phone: [phone]    Pt remains on work list pending appropriate bed placement.

## 2022-07-31 NOTE — DISCHARGE INSTRUCTIONS
Aftercare Plan  If I am feeling unsafe or I am in a crisis, I will:   Contact my established care providers   Call the National Suicide Prevention Lifeline: 988  Go to the nearest emergency room   Call 911     Warning signs that I or other people might notice when a crisis is developing for me: Urges to use alcohol, Increased agitation, anxiety, irritability, sadness/unhappiness, crying    Things I am able to do on my own to cope or help me feel better: watch 24, practice relaxation techniques, go to [place]. pray    Things that I am able to do with others to cope or help me better: attend Druze or AA meetings    Things I can use or do for distraction: listen to music, talk to friends, prayer, aa meetings, look for a job    Changes I can make to support my mental health and wellness: discontinue, or reduce alcohol consumption, and meet with mental health professional 1x every 2 weeks    People in my life that I can ask for help: friends    Your Critical access hospital has a mental health crisis team you can call 24/7: [facility] Crisis  [phone]    Other things that are important when I m in crisis: getting a job, being there for my family    Additional resources and information:     Substance Abuse Resources    To access substance abuse treatment you must have an assessment complete or have an updated assessment within 30 days of starting any program.  Information for this to be completed and to secure funding if you have medical assistance or no insurance can be found through your [facility]'s [employer] health intake line.    If you have private insurance, call the customer service number on the back of your insurance card to find an in-network provider for substance abuse assessment. The ideal provider will be a treatment facility, licensed in the [facility].    For those with Medicaid with the [facility], you will need a Rule 25 assessment: The following are phone numbers for each Critical access hospital. Rule 25 assessments must be  completed by the county you reside in currently. Once approved for funding you can connect with a facility that does Rule 25 assessment.  [place] - [phone]  [place] - [phone]  [facility] [phone]  [place] - [phone]  [facility] [phone]  [place] - [phone]  [place] - [phone]  [place] - [phone]    The following facilities offer Rule 25/chemical health assessments:    [facility]  [phone]  Mon-Friday: [address] [facility], [address]  Saturday:  [address] [facility], [address]  M-F assessments (7a-1:45p); Saturday assessments (7:45-10:45a)    [facility]  [phone]  [address] [place], [place], [address]  *by appointment only M-W; walk ins available Fridays from 10-2.    [facility]  [phone] (phone consultation available 24/7)  In-person Assessments:  [address] [facility], [address], [place] [address] [address] [place] [address]  [address] [place] [address]  [address] [place] [address]     [facility]  [phone]  [address]  [place], [place], [address]  *walk in and appointments available by calling    [facility]  [phone] [address] [place], [place], [address]  *by appointment only M-Th    [facility]  [phone]  [address] [place], [place], [address]  *walk ins available M-F    [facility]  [phone] [address] [place], [place], [address]  *available by appointments only      [facility]  [phone] [address] [place], [place], [address]  *available by appointment only      [facility]  [phone]   - Outpatient Mental Health and Addiction  [address] [place], [place], [address]    [facility]patient Alcohol [facility] Drug Abuse [facility] (ADAP)  [phone]  [address] [place] [address] [address]  [place], [place], [address]  *Walk in assessments also available M-F starting at 8  am.    [facility]  [phone]  [address] [place]  [place], [address]    *available by appointments      [facility]  [phone]  [address] [place], [place], [address]  *walk in assessments available M-F starting at 7 am.    [facility]  [phone]  [facility]  [address] [place], [place], [address]  *Walk in assessments availble M-F starting at 8 am OR [phone]    [facility]  [address] [place] [address]  *Walk in assessments available M-F starting at 8 am    [facility]  [phone]  [address] [place] [address]    [facility]  [phone]  [address] [place], [place], [address]    *available by appointment only    [facility]  [phone]  [address] [place], [address]    [facility]  [phone]  [address] [place], [address]  *available by appointment    [facility]  [phone]  [address] [place], [place], [address]  *walk ins available M-TH from 9-3    [facility]  [phone]  [address] [place], [address]    [facility]  [phone] [address] [place] [address]  [address] [address], [place] [address]    [facility] (Does Rule 25 Assessments)  [email or web address]/  [phone]  [address] [place] [address], [address], [place] [address]    [facility]  [email or web address]-services/drug-[email or web address]-treatment  [phone], [address], [place] [address]  [phone], [address], [place] [address]  [phone], [address] [facility], [address], [place] [address]  [phone], [address] [facility] , [address], ([facility]), [place], [place] [address] [phone], [address] [place] [address]      If you are intoxicated, you may be required to detox at a detox  facility before starting treatment. The following are detox facilities that you can self present to. All detox facilities are able to help you complete an assessment/rule 25 prior to discharge if you choose:      [facility]: [address] [place], [place], [address].         [phone]    [facility]: [address] [place], [place], [address]  [phone]    [facility]: [address] [place], [place], [address] [phone]    [facility]: [address] [place], [place], [address] [phone]       It is recommended that you abstain from all mood altering chemicals. Please contact the sober support hotline (046-613-7915) as needed; phones are answered 24 hours a day, 7 days a week. Other support hotlines include:    [facility] Mental Health & Addiction: 0-392-300-9959    [facility] Support Line: 1-911.882.8255       Ways to help cope with sobriety:    -- Take prescribed medicines as scheduled  -- Keep follow-up appointments  -- Talk to others about your concerns  -- Get regular exercise    -- Practice deep breathing skills  -- Eat a healthy diet  -- Use community resources, including hotline numbers, [facility] crisis and support meetings  -- Stay sober and avoid places/people/things associated with substance use    --Maintain a daily schedule/routine    --Get at least 7-8 hours of sleep per night    --Create a list 10--20 healthy activities that you can do that are enjoyable and do not involve substance use    --Create daily goals (approx. 1-4 goals) per day and work to achieve them throughout the day.         [place] [facility] ([facility])  [facility] connects people seeking recovery to resources that help foster and sustain long-term recovery. Whether you are seeking resources for treatment, transportation, housing, job training, education, health care or other pathways to recovery, [facility] is a great place to start.    Phone: 330.746.6403. www.Quietyme.ExpenseBot (Great listing of all  types of recovery and non-recovery related resources)    Alcoholics Anonymous    Phone: 7-076-ALCOHOL    Website: HTTP://WWW.AA.ORG/      AA [place] (256-310-0360 or http://a[place].org)    AA [place] (836-081-9124 or www.aastpaul.org)      Narcotics Anonymous    Phone: 459.393.7179    Website: www.Leadwerks.Oree.    [facility] [employer] [address], [place], [place],    Phone: [phone]    Drop-in Hours: Monday-Friday 9-11:30 am. By appointment at other times.    Provides: [facility] is a drop-in center on the east side [facility] that provides a safe space for individuals who are homeless and have a history of chemical use. Sobriety is not a requirement but drugs and alcohol are not allowed on the property.    Services: Non-clients can access drop-in services such as Recovery and Harm Reduction Groups, referrals to case management, community activities, shower facilities, and a pool table. Individuals who are homeless and have chemical health needs may be eligible for enrollment into [employer]'s case management program. Clients and  work together to access benefits, treatment, health care, shelter, and external housing resources.      [facility] Chemical Assessment & Referral Unit    [address] [place], [place]    Phone: [phone]    Hours: Monday-Friday 8 am-5 pm.    Provides: Rule 25 assessment and referral for individuals seeking treatment or counseling for chemical dependency. Must be a resident of [facility]. There is no fee for assessment. There is some funding available for treatment programs.      [facility]    [address] [facility] Phone: [phone] Main: [phone]    Hours: Monday through Friday 7 am-5 pm    Provides: Daily drop-in Rule 25 assessments and weekly mental health assessments and services. Outpatient chemical dependency treatment (with sober recovery housing), relapse prevention, case  "management, and employment services. Outpatient and residential treatment for women with children. Specializes in assisting individuals with a history of relapse who face multiple barriers to achieving stable recovery.    Remarks: No charge for most services or services can be billed through insurance. Gender-specific services and treatment for co-occurring substance abuse and mental health concerns offered.      Crisis Lines  Crisis Text Line  Text 294325  You will be connected with a trained live crisis counselor to provide support.    National Hope Line  1.800.SUICIDE [6945896]      Community Resources  Fast Tracker  Linking people to mental health and substance use disorder resources  [employer] [facility] and [facility] Servicesn.org     [place] Mental Health Warm Line  Peer to peer support  Monday thru Saturday, 12 pm to 10 pm  200.541.0942 or 8.439.470.5845  Text \"Support\" to 26083    National [place] on Mental Illness (SUKUMAR)  515.636.0877 or 1.888.SUKUMAR.HELPS        Mental Health Apps  My3  https://WiMi5.Duable Chinese/    VirtualHopeBox  https://[employer]/apps/virtual-hope-box/    P SCHEDULING:  Today you were seen by a licensed mental health professional through [facility] and [facility] ([facility])  for a crisis assessment in the Emergency Department at [facility].  It is recommended that you follow up with your estabished providers (psychiatrist, mental health therapist, and/or primary care doctor - as relevant) as soon as possible. Coordinators from [facility] will be calling you in the next 24-48 hours to ensure that you have the resources you need.  You can also contact [facility] coordinators directly at [phone].    You have been scheduled the following appointments: A transition Clinic Referral has been made for you. They will contact you tomorrow, and will assist you with psychotherapy until you can get to your next appointment scheduled on: 10/10/22 @ 5:00 PMPRADEEP [facility], [address] [place] [address], [address], [facility], [phone]      [facility] maintains an extensive network of licensed behavioral health providers to connect patients with the services they need.  We do not charge providers a fee to participate in our referral network.  We match patients with providers based on a patient s specific needs, insurance coverage, and location.  Our first effort will be to refer you to a provider within your care system, and will utilize providers outside your care system as needed.

## 2022-08-01 ENCOUNTER — TELEPHONE (OUTPATIENT)
Dept: BEHAVIORAL HEALTH | Facility: CLINIC | Age: 52
End: 2022-08-01

## 2022-08-01 NOTE — TELEPHONE ENCOUNTER
Second attempt to contact pt. Writer left a VM with TC contact info and encouraged a phone call back to schedule initial therapy appointment. Coordinator will hansa referral as complete and will inform referral source that no appointments were scheduled wit pt due to no contact. Tracker completed.      Chey Mcdermott  08/01/22  822    ----- Message from Abi Gunn sent at 7/31/2022  9:48 AM CDT -----  Regarding: TC Referral  Transition Clinic Referral   [place] Only   Limited [place] Availability    Type of Referral:    _X___Therapy Only: Does your patient require a same or next day appointment?:   ____Medication Only: Referral will automatically be declined if no next level of care scheduled. Suboxone and Opioid management referrals are automatically denied.  ____Therapy & Medication:  Referral will automatically be declined if no next level of care scheduled. Suboxone and Opioid management referrals are automatically denied.  ____Diagnostic Assessment     Suboxone and Opioid Management Referrals are Automatically Denied    TC Psychiatry cannot see patients who do not have active medical insurance    Referring Provider Name: Abi Gunn    Clinician completing the assessment. Delilah Shah    Referring Provider: TRIAGE & TRANSITION ([facility]) CLINICIAN     If known, referring provider contact name: Dimitri Jarrett; Phone Number: [phone]  Service Line/Location: Extended Care    Reason for Transition Clinic Referral: Bridging services until patient can start with scheduled providers    Next Level of Care Patient Will Be Transitioned To: Outpatient Therapy services  Provider(s)Richard Catherine  TC Psychiatry cannot see patients who do not have active medical insurance    What Would Be Helpful from the Transition Clinic: N/A     Needs: NO    Does Patient Have Access to Technology: Yes    Patient E-mail Address: [email or web address]    Current Patient Phone  Number: [phone]    Clinician Gender Preference (if applicable): ANDREW Gunn

## 2022-09-01 ENCOUNTER — TELEPHONE (OUTPATIENT)
Dept: BEHAVIORAL HEALTH | Facility: CLINIC | Age: 52
End: 2022-09-01

## 2022-09-01 ENCOUNTER — HOSPITAL ENCOUNTER (EMERGENCY)
Facility: CLINIC | Age: 52
Discharge: HOME | End: 2022-09-02
Attending: EMERGENCY MEDICINE | Admitting: EMERGENCY MEDICINE
Payer: MEDICAID

## 2022-09-01 DIAGNOSIS — R45.851 SUICIDAL IDEATION: ICD-10-CM

## 2022-09-01 DIAGNOSIS — F10.920 ACUTE ALCOHOLIC INTOXICATION WITHOUT COMPLICATION (H): ICD-10-CM

## 2022-09-01 LAB
AMPHETAMINES UR QL SCN: NORMAL
ANION GAP SERPL CALCULATED.3IONS-SCNC: 14 MMOL/L (ref 7–15)
APAP SERPL-MCNC: <5 UG/ML (ref 10–30)
BARBITURATES UR QL SCN: NORMAL
BENZODIAZ UR QL SCN: NORMAL
BUN SERPL-MCNC: 11.5 MG/DL (ref 6–20)
BZE UR QL SCN: NORMAL
CALCIUM SERPL-MCNC: 9.1 MG/DL (ref 8.6–10)
CANNABINOIDS UR QL SCN: NORMAL
CHLORIDE SERPL-SCNC: 101 MMOL/L (ref 98–107)
CREAT SERPL-MCNC: 0.79 MG/DL (ref 0.51–0.95)
DEPRECATED HCO3 PLAS-SCNC: 26 MMOL/L (ref 22–29)
ETHANOL SERPL-MCNC: 0.25 G/DL
GFR SERPL CREATININE-BSD FRML MDRD: >90 ML/MIN/1.73M2
GLUCOSE SERPL-MCNC: 104 MG/DL (ref 70–99)
HOLD SPECIMEN: NORMAL
OPIATES UR QL SCN: NORMAL
POTASSIUM SERPL-SCNC: 3.2 MMOL/L (ref 3.4–5.3)
SALICYLATES SERPL-MCNC: <0.5 MG/DL
SARS-COV-2 RNA RESP QL NAA+PROBE: NEGATIVE
SODIUM SERPL-SCNC: 141 MMOL/L (ref 136–145)

## 2022-09-01 PROCEDURE — C9803 HOPD COVID-19 SPEC COLLECT: HCPCS

## 2022-09-01 PROCEDURE — 82310 ASSAY OF CALCIUM: CPT | Performed by: EMERGENCY MEDICINE

## 2022-09-01 PROCEDURE — 80143 DRUG ASSAY ACETAMINOPHEN: CPT | Performed by: EMERGENCY MEDICINE

## 2022-09-01 PROCEDURE — 82077 ASSAY SPEC XCP UR&BREATH IA: CPT | Performed by: EMERGENCY MEDICINE

## 2022-09-01 PROCEDURE — 250N000011 HC RX IP 250 OP 636: Performed by: EMERGENCY MEDICINE

## 2022-09-01 PROCEDURE — 99285 EMERGENCY DEPT VISIT HI MDM: CPT | Mod: 25

## 2022-09-01 PROCEDURE — 90791 PSYCH DIAGNOSTIC EVALUATION: CPT

## 2022-09-01 PROCEDURE — U0003 INFECTIOUS AGENT DETECTION BY NUCLEIC ACID (DNA OR RNA); SEVERE ACUTE RESPIRATORY SYNDROME CORONAVIRUS 2 (SARS-COV-2) (CORONAVIRUS DISEASE [COVID-19]), AMPLIFIED PROBE TECHNIQUE, MAKING USE OF HIGH THROUGHPUT TECHNOLOGIES AS DESCRIBED BY CMS-2020-01-R: HCPCS | Performed by: EMERGENCY MEDICINE

## 2022-09-01 PROCEDURE — 96372 THER/PROPH/DIAG INJ SC/IM: CPT | Performed by: EMERGENCY MEDICINE

## 2022-09-01 PROCEDURE — 80307 DRUG TEST PRSMV CHEM ANLYZR: CPT | Performed by: EMERGENCY MEDICINE

## 2022-09-01 PROCEDURE — 36415 COLL VENOUS BLD VENIPUNCTURE: CPT | Performed by: EMERGENCY MEDICINE

## 2022-09-01 PROCEDURE — 80179 DRUG ASSAY SALICYLATE: CPT | Performed by: EMERGENCY MEDICINE

## 2022-09-01 RX ORDER — OLANZAPINE 5 MG/1
10 TABLET, ORALLY DISINTEGRATING ORAL 2 TIMES DAILY PRN
Status: DISCONTINUED | OUTPATIENT
Start: 2022-09-01 | End: 2022-09-02 | Stop reason: HOSPADM

## 2022-09-01 RX ORDER — OLANZAPINE 10 MG/2ML
5 INJECTION, POWDER, FOR SOLUTION INTRAMUSCULAR ONCE
Status: COMPLETED | OUTPATIENT
Start: 2022-09-01 | End: 2022-09-01

## 2022-09-01 RX ADMIN — OLANZAPINE 5 MG: 10 INJECTION, POWDER, FOR SOLUTION INTRAMUSCULAR at 09:52

## 2022-09-01 ASSESSMENT — ACTIVITIES OF DAILY LIVING (ADL)
ADLS_ACUITY_SCORE: 35
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
## 2022-09-01 NOTE — CONSULTS
9/1/2022  Jose Corral 1970     Writer consulted with ED staff,  on this date at 10:08 AM. It was determined that pt would not benefit from assessment at this time due to Pt unable able to participate in assessment    ED will resubmit for DEC assessment when pt is more calm and ready for assessment. Pt recently required code 21 and zyprexa.    Paige Schwab [facility]

## 2022-09-01 NOTE — ED TRIAGE NOTES
Patient arrives via EMS.  She reports being homeless and getting kicked out of where she had been staying with friends.  Patient reports SI, does not verbalized plan to RN.  Patient also reports alcohol use.  ABCs intact, A&Ox4.     Triage Assessment     Row Name 09/01/22 0916       Triage Assessment (Adult)    Airway WDL WDL       Respiratory WDL    Respiratory WDL WDL       Skin Circulation/Temperature WDL    Skin Circulation/Temperature WDL WDL       Cardiac WDL    Cardiac WDL WDL       Peripheral/Neurovascular WDL    Peripheral Neurovascular WDL WDL       Cognitive/Neuro/Behavioral WDL    Cognitive/Neuro/Behavioral WDL WDL

## 2022-09-01 NOTE — ED NOTES
Pt called brother multiple times on room phone. Brother called back and requested to speak with patient.Pt approved to talk to brother.

## 2022-09-01 NOTE — ED NOTES
Pt asked to speak with writer about 72 hour hold. Pt was educated that it counts for the time she is here. Pt then became tearful stating that she has no where to go after here if sh is discharge. Pt then was educated that we are looking for MH admission but the writer is unsure of exactly where at this time. Pt continued to be tearful and was told that SW can see the patient and help get resources for when the pt is discharge. Pt continues to be tearful but states understanding.

## 2022-09-01 NOTE — ED NOTES
Bed: ED04  Expected date: 9/1/22  Expected time: 9:12 AM  Means of arrival:   Comments:  BV4  51F  SI

## 2022-09-01 NOTE — ED PROVIDER NOTES
"  History   Chief Complaint:  Suicidal     The history is provided by the patient.      Jose Corral is a 51 year old female with history of asthma, diabetes, and hypertension who presents with suicidal ideation and anxiety. She reports she was homeless and staying with a friend who kicked her out. She is now distressed with suicidal ideation and anxiety. She first told the nurse that she did not have any plan, but later told her that she would take some pills. She states that she has not taken her medicines today.     Review of Systems   All other systems reviewed and are negative.      Allergies:  The patient denies any known drug allergies.     Medications:  Lisinopril   Tylenol   Coreg  Lasix  Cozaar  Robaxin   Xarelto   Nasonex   Albuterol inhaler   Toprol   Norvasc   Lidoderm   Voltaren     Past Medical History:    Allergic rhinitis   Asthma  Carpal tunnel syndrome   Diabetes   DUB  Hypertension   Obesity   Sarcoidosis  Subdural hematoma   Trichomonal vaginitis   Palpitations   Cough  Suicidal ideation   Hypertensive surgery   Depression   Chest pain   Hypokalemia   Hypomagnesia   Pneumonia   Alcohol induced acute pancreatitis   Dyspnea   Peripheral edema  Medical noncompliance   Acute DVT    Sarcoidosis     Past Surgical History:    Cholecystectomy     Family History:    Mother: diabetes, heart disease  Father: unknown     Social History:  Patient is unaccompanied in the ED.  Patient is homeless.     Physical Exam     Patient Vitals for the past 24 hrs:   BP Pulse Resp SpO2 Height   09/01/22 0915 (!) 148/101 113 20 100 % 1.575 m (5' 2\")       Physical Exam   General: pacing the room refusing to sit on the bed  Head: No obvious trauma to head.  Ears, Nose, Throat:  External ears normal.  Nose normal.   Eyes:  Conjunctivae clear.    CV: Regular rate and rhythm.   Respiratory: Effort normal no increased work of breathing   Gastrointestinal: Soft.  No distension.   Neuro: Alert. Moving all extremities " appropriately.  Normal speech.    Skin: Skin is warm and dry.  No rash noted.   Psych: Tearful, emotionally labile.  Report suicidal ideation.    Emergency Department Course     Laboratory:  Labs Ordered and Resulted from Time of ED Arrival to Time of ED Departure   BASIC METABOLIC PANEL - Abnormal       Result Value    Creatinine 0.79      Sodium 141      Potassium 3.2 (*)     Urea Nitrogen 11.5      Chloride 101      Carbon Dioxide (CO2) 26      Anion Gap 14      Glucose 104 (*)     GFR Estimate >90      Calcium 9.1     ETHYL ALCOHOL LEVEL - Abnormal    Alcohol ethyl 0.25 (*)    ACETAMINOPHEN LEVEL - Abnormal    Acetaminophen <5.0 (*)    SALICYLATE LEVEL - Normal    Salicylate <0.5     DRUG ABUSE SCREEN 1 URINE (ED) - Normal    Amphetamines Urine Screen Negative      Barbituates Urine Screen Negative      Benzodiazepine Urine Screen Negative      Cannabinoids Urine Screen Negative      Cocaine Urine Screen Negative      Opiates Urine Screen Negative     ALCOHOL BREATH TEST POCT      Emergency Department Course:  Mental Health Risk Assessment      PSS-3    Date and Time Over the past 2 weeks have you felt down, depressed, or hopeless? Over the past 2 weeks have you had thoughts of killing yourself? Have you ever attempted to kill yourself? When did this last happen? User   09/01/22 0918 yes yes yes -- LS      C-SSRS ([place])    Date and Time Q1 Wished to be Dead (Past Month) Q2 Suicidal Thoughts (Past Month) Q3 Suicidal Thought Method Q4 Suicidal Intent without Specific Plan Q5 Suicide Intent with Specific Plan Q6 Suicide Behavior (Lifetime) Within the Past 3 Months? RETIRED: Level of Risk per Screen Screening Not Complete User   09/01/22 0929 yes yes yes no yes yes -- -- -- LS   09/01/22 0918 yes yes yes no yes yes -- -- -- LS            Suicide assessment completed by mental health (D.E.C., LCSW, etc.)    Reviewed:  I reviewed nursing notes, vitals, past medical history and Care  Everywhere    Assessments:  925 I obtained history and examined the patient as noted above.   0943 In person face to face assessment completed, including an evaluation of the patient's immediate reaction to the intervention, complete review of systems assessment, behavioral assessment and review/assessment of history, drugs and medications, recent labs, etc., and behavioral condition.  The patient experienced: No adverse physical outcome from seclusion/restraint initiation.  The intervention of restraint or seclusion needs to continue.    09:43 AM      Interventions:  Medications   OLANZapine zydis (zyPREXA) ODT tab 10 mg (has no administration in time range)   OLANZapine (zyPREXA) injection 5 mg (5 mg Intramuscular Given 22 0952)     Disposition:  Care of the patient was transferred to my colleague Dr. Tovar pending admission.  assesment.     Impression & Plan     CMS Diagnoses: None    Medical Decision Makin-year-old female with a history of suicidal ideation presents to the ER with suicidal ideation.  Patient is very emotionally labile.  She does report using alcohol.  She attempted to take another swig prior to the alcohol bottle being removed.  There is no signs of trauma on head to toe exam.  She is moving all extremities appropriately.  Walking without assistance.  Unfortunately patient was not cooperative and required both physicial and chemical restraints.  BMP shows no acute electrolyte, metabolic or renal dysfunction.  No signs of toxic alcohols.  Alcohol level is 0.25 likely the etiology of patient's aggressive behavior and altered mental status.  Tylenol and salicylates negative.  Drug screen negative.  There is no signs of trauma.  Patient denies any acute medical concerns.  At this point patient pending clinical sobriety for DEC assessment.  DEC patient.  She is unable to contract for safety.  She was placed on a 7 hold.  Plan for mental health admission.  Patient will be signed out to  Dr. Tovar pending admission.      Diagnosis:    ICD-10-CM    1. Suicidal ideation  R45.851        Discharge Medications:  New Prescriptions    No medications on file       Scribe Disclosure:  I, Katja Bobby, am serving as a scribe at 9:17 AM on 9/1/2022 to document services personally performed by Steffi Marcos MD based on my observations and the provider's statements to me.          Steffi Marcos MD  09/01/22 1544

## 2022-09-01 NOTE — ED NOTES
After RN searched pt and secured her belongings.  Patient escalated and became more anxious and tearful.  She wanted to see her belongings and was told they were locked in the DEC office.  Patient then started to push and swing her arm at security.  Code 21 activated at 0944.  Patient placed in 5 point restraints.  IM Zyprexa administered, see MAR.

## 2022-09-01 NOTE — PHARMACY-ADMISSION MEDICATION HISTORY
Admission medication history interview status for this patient is complete. See [facility] admission navigator for allergy information, prior to admission medications and immunization status.     Medication history interview done, indicate source(s): Patient  Medication history resources (including written lists, pill bottles, clinic record): Epic list, fill history, chart review.  Pharmacy: [facility] [address], [place].     Changes made to PTA medication list:  Added: none  Changed: none  Reported as Not Taking: acetaminophen 500-1000 q6h prn, methocarbamol 500 tid prn if Tylenol not effective.  Removed: none    Actions taken by pharmacist (provider contacted, etc):None     Additional medication history information:None    Medication reconciliation/reorder completed by provider prior to medication history?  N   (Y/N)     Prior to Admission medications    Medication Sig Last Dose Taking? Auth Provider Long Term End Date   carvedilol (COREG) 25 MG tablet Take 1 tablet (25 mg) by mouth 2 times daily (with meals) 8/29/2022 at Unknown time Yes Leatha Edward PA-C Yes    furosemide (LASIX) 20 MG tablet Take 1 tablet (20 mg) by mouth daily 8/29/2022 at Unknown time Yes Arlene Welsh APRN CNP Yes    losartan (COZAAR) 100 MG tablet Take 1 tablet (100 mg) by mouth daily 8/29/2022 at Unknown time Yes Leatha Edward PA-C Yes    magnesium oxide (MAG-OX) 400 MG tablet Take 1 tablet (400 mg) by mouth daily 8/29/2022 at Unknown time Yes Austin Quiros MD     rivaroxaban ANTICOAGULANT (XARELTO) 20 MG TABS tablet Take 1 tablet (20 mg) by mouth daily (with dinner) 8/29/2022 at unknown Yes Arlene Welsh APRN CNP Yes    acetaminophen (TYLENOL) 500 MG tablet Take 1-2 tablets (500-1,000 mg) by mouth every 6 hours as needed for mild pain  Patient not taking: Reported on 9/1/2022 Not Taking at Unknown time  Elizabeth Ann PA-C     methocarbamol (ROBAXIN) 500 MG tablet Take 1 tablet (500 mg) by mouth 3 times daily as needed  for other (leg pain) If tylenol is ineffective  Patient not taking: Reported on 9/1/2022 Not Taking at Unknown time  Leatha Edward PA-C     potassium chloride ER (K-TAB) 20 MEQ CR tablet Take 1 tablet (20 mEq) by mouth daily 8/29/2022 at unknown  Arlene Welsh APRN CNP     rivaroxaban ANTICOAGULANT (XARELTO) 20 MG TABS tablet 15 mg twice daily with food for 3 weeks, then 20 mg once daily with food (NOTE: will need 42 15 mg tablets the first time this is filled)   Leatha Edward PA-C Yes

## 2022-09-01 NOTE — ED NOTES
Patient searched, refused scrubs at this time.  When patient was being searched she reached into her backpack and attempted to take a drink of vodka from a bottle.  Security disposed of vodka.  2 backpacks and one pt belonging bag secured in DEC office.

## 2022-09-01 NOTE — DISCHARGE INSTRUCTIONS
"Open Access Connections-   Help in getting a cell phone  They currently have cell phones available. Come see them during walk-in hours Monday, Wednesday, or Friday from 12:00-2:00, or call them at [phone] to schedule an appointment  Address: [address] [place] [address]  [address]  [place] [address]    Beyond Backgrounds   Beyond Backgrounds makes it more likely that a landlord will relax their screening criteria and rent to you! When a landlord rents to you, they get up to $2,000 of financial assurance if something goes wrong like damage to the unit or legal fees.   https://housinglink.org/beyond-backgrounds    Housinglink.org  Help in finding housing in your price range and ideal location     Aftercare Plan  If I am feeling unsafe or I am in a crisis, I will:   Contact my established care providers   Call the National Suicide Prevention Lifeline: 98  Go to the nearest emergency room   Call 911   Warning signs that I or other people might notice when a crisis is developing for me: feeling mistreated/put down, having more disagreements with others, going into \"pity mode\"- crying, smoking.  Increased drinking    Things I am able to do on my own to cope or help me feel better: letting my emotions out/crying, take a shower/bath, reach out to someone, talking to God     Things that I am able to do with others to cope or help me better: play a game/cards, talk with someone, work, start going back to Cheondoism.     Things I can use or do for distraction: play a game, talk to someone, take shower/bath, work, talk to God, go for a walk.    Changes I can make to support my mental health and wellness: start taking my antidepressant, starting working again.  Focus on the life goals you wish to achieve and keep alcohol use from interfering with what you want from life.     People in my life that I can ask for help: God, friends, brother    Your [facility] has a mental health crisis team you can call 24/7: [facility] Crisis  " [phone]    Other things that are important when I'm in crisis:     Reduce Extreme Emotion  QUICKLY:  Changing Your Body Chemistry      T:  Change your body Temperature to change your autonomic nervous system   Use Ice Water to calm yourself down FAST   Put your face in a bowl of ice water (this is the best way; have the person keep his/her face in ice water for 30-45 seconds - initial research is showing that the longer s/he can hold her/his face in the water, the better the response), or   Splash ice water on your face, or hold an ice pack on your face      I:  Intensely exercise to calm down a body revved up by emotion   Examples: running, walking fast, jumping, playing basketball, weight lifting, swimming, calisthenics, etc.   Engage in exercises that DO NOT include violent behaviors. Exercises that utilize violent behaviors tend to function as  behavioral rehearsal,  and rather than calming the person down, may actually  rev  the person up more, increasing the likelihood of violence, and lessening the likelihood that they will  burn off  energy     P:  Progressively relax your muscles   Starting with your hands, moving to your forearms, upper arms, shoulders, neck, forehead, eyes, cheeks and lips, tongue and teeth, chest, upper back, stomach, buttocks, thighs, calves, ankles, feet   Tense (10 seconds,   of the way), then relax each muscle (all the way)   Notice the tension   Notice the difference when relaxed (by tensing first, and then relaxing, you are able to get a more thorough relaxation than by simply relaxing)     P: Paced breathing to relax   The standard technique is to begin with counting the number of steps one takes for a typical inhale, then counting the steps one takes for a typical exhale, and then lengthening the amount of steps for the exhalation by one or two steps.  OR  Repeat this pattern for 1-2 minutes  Inhale for four (4) seconds   Exhale for six (6) to eight (8) seconds   Research  "demonstrated that one can change one's overall level of anxiety by doing this exercise for even a few minutes per day       Additional resources and information:     *[place] CD Intake  (type CD intake in the search field)  www.[facility].org  103.652.7041   inpatient services [phone]  or [phone] To arrange an appointment with CD counselor   [facility]  www[email or web address]  [phone] Hours vary, call for information  Rule 25 assessments  Counseling & assessments  For CD & outpatient treatment   [place]  Teen Challenge ([place]TC)  http://mntc.org   [phone] [facility]  Residential drug and alcohol programs serving teens and adults from all ethnic, socioeconomic and Roman Catholic backgrounds       AA                                     [phone]                        [place] and [facility] site  http://www.aastpaul.org/         Crisis Lines  Crisis Text Line  Text 358473  You will be connected with a trained live crisis counselor to provide support.    Por espanol, texto  JAYJAY a 443642 o texto a 442-AYUDAME en WhatsApp    The Robert Project (LGBTQ Youth Crisis Line)  7.358.708.3503  text START to 328-354      [facility]  Fast Tracker  Linking people to mental health and substance use disorder resources  [employer].org     [place] Mental Health Warm Line  Peer to peer support  Monday thru Saturday, 12 pm to 10 pm  122.667.0975 or 0.578.280.6722  Text \"Support\" to 83005    National [place] on Mental Illness (SUKUMAR)  209.981.7120 or 1.888.SUKUMAR.HELPS      Mental Health Apps  My3  https://mymySchoolNotebookpp.org/    VirtualHopeBox  https://Lobster.org/apps/virtual-hope-box/      Additional Information  Today you were seen by a licensed mental health professional through Triage and Transition services, [facility]iders ([facility])  for a crisis assessment in the Emergency Department at [facility].  It is recommended that you follow up with " your established providers (psychiatrist, mental health therapist, and/or primary care doctor - as relevant) as soon as possible. Coordinators from [facility] will be calling you in the next 24-48 hours to ensure that you have the resources you need.  You can also contact [facility] coordinators directly at [phone]. You may have been scheduled for or offered an appointment with a mental health provider. [facility] maintains an extensive network of licensed behavioral health providers to connect patients with the services they need.  We do not charge providers a fee to participate in our referral network.  We match patients with providers based on a patient's specific needs, insurance coverage, and location.  Our first effort will be to refer you to a provider within your care system, and will utilize providers outside your care system as needed.

## 2022-09-01 NOTE — SAFE
Jose Corral  September 1, 2022  SAFE Note    Critical Safety Issues: Patient presents with suicidal ideation with a plan to either overdose or step in front of a moving vehicle.      Current Suicidal Ideation/Self-Injurious Concerns/Methods: Ingestion Medication and Jumping (from building, into traffic, etc.)      Current or Historical Inappropriate Sexual Behavior: No      Current or Historical Aggression/Homicidal Ideation: Agitation/Hyperactivity      Triggers: None    Guardianship Status: Jose Corral is her own guardian.. Guardianship paperwork is not required.    This patient is a child/adolescent: No    This patient has additional special visitor precautions: No    Updated care team: Yes: Provider in support of hospitalization    For additional details see full LMHP assessment.       Cherie Hardin, ROSALIESW

## 2022-09-02 ENCOUNTER — TELEPHONE (OUTPATIENT)
Dept: BEHAVIORAL HEALTH | Facility: CLINIC | Age: 52
End: 2022-09-02

## 2022-09-02 VITALS
HEART RATE: 73 BPM | OXYGEN SATURATION: 100 % | RESPIRATION RATE: 18 BRPM | TEMPERATURE: 98 F | HEIGHT: 62 IN | BODY MASS INDEX: 39.47 KG/M2 | DIASTOLIC BLOOD PRESSURE: 103 MMHG | SYSTOLIC BLOOD PRESSURE: 174 MMHG

## 2022-09-02 ASSESSMENT — ACTIVITIES OF DAILY LIVING (ADL)
ADLS_ACUITY_SCORE: 35

## 2022-09-02 NOTE — ED NOTES
Care Management Discharge Note    Discharge Date:         Discharge Disposition: Home/ with friend    Discharge Services: Other (see comment) (Handbook of the Street)    Discharge DME: None    Discharge Transportation:      Private pay costs discussed: Not applicable    PAS Confirmation Code:    Patient/family educated on Medicare website which has current facility and service quality ratings: no      Additional Information:  SW met with patient at bedside for community resources. Patient lost her job and was evicted from her apartment. Patient has been staying with her friend and her friends . She was kicked out but now her friend agrees to let her stay until she can get back on her feet. Patient plans to start working as a  soon. Patient has previously worked with [facility] for eviction prevention, but the landlord was not willing to accept the payment/ the payment did not come in time.     Patient is looking for resources with her barriers to housing. SW placed information on Housing Link and Beyond Backgrounds in her AVS. Patient was given the Handbook of the [facility] to call for shelter information. SW explained shelter process. SW also gave information on the [facility] Housing Crisis Line.     Patient does not have a bus card, phone, or ID. SW placed information on Open Access in AVS. SW gave Handbook of The [employer] for help navigating bus cards and payment for IDs.     MOY Villalta, [facility]  Emergency Room   [phone]-Please contact the SW on the floor in which the patient is staying for any questions or concerns

## 2022-09-02 NOTE — CONSULTS
Care Management Discharge Note    Discharge Date:      Additional Information:  See ED note for SW consult.     MOY Villalta, [facility]  Emergency Room   [phone]-Please contact the SW on the floor in which the patient is staying for any questions or concerns

## 2022-09-02 NOTE — ED NOTES
Patient's vital updated. Patient requesting a DEC reassessment. States she has a discharge plan and her anxiety level has decreased so she is interested in being discharged as she now feels safe.     Pt up to the bathroom.

## 2022-09-02 NOTE — ED NOTES
[facility] Crisis Reassessment      Jose Corral was reassessed at the request of ED team for the following reasons: patient requesting discharge and has spent time in ED sobering. Pt was first seen on 9/1 by Cherie Louise; see the initial assessment note for details.      Patient Presentation    Initial ED presentation details: Patient presented to the ED after getting kicked out of her friend's home.  Patient is homeless.  She presented to the ED intoxicated with a BAL of .25.  She had a bottle of vodka in her belonging which she attempted to drink in the ED.  It was taken from her. She became aggressive and was placed in 5 point restraints and received IM Zyprexa.  Patient was initially assessed after several hours in the ED.  Patient reported suicidal ideation with a plan to overdose or to jump in front of a vehicle.  She had reported overdosing in July 22.  Patient was placed on a 72 hour hold and was awaiting inpatient admission.    Current patient presentation: Patient is calm and cooperative.  She is asking to discharge.  Patient states she spoke with her friend who had kicked her out and she is willing to have her come back.  Patient shares she had lost her prior job and housing then her friend let her stay with them.  She is anticipating starting a new job on 9/6 as a .  She reports her friends kicking her out also meant that she would not be able to work the new job.  She identifies that she became hopeless and suicidal.  She states she was nervous about losing the job and she wants to work toward housing.  She denies suicidal ideation currently.  She denies plan or intent.  She denies SIB, HI, or hallucinations.  Clinician attempted to speak with patient about her drinking.  Patient minimized her drinking and displayed poor insight into her drinking as contributing to her presentation to the ED.  Patient states she had been drinking everyday recently then states she does not understand how anyone  thought she was drunk when she came in.  Patient referenced episodes of blacking out and states she feels that she needed to scale back her drinking.  Discussed her intoxication level when she came and impacts of alcohol on judgment.  Patient continues to show poor insight.  Patient displays goal oriented, future thinking and is able to engage in safety planning.  Patient shares she had not wanted to take antidepressants due to concerns about side effects.  She reports after speaking with the psychiatrist in the ED she feels more comfortable taking the medication and will start what is  already prescribed to her.  She reports she will follow up with a provider at [facility].      Changes observed since initial assessment: calm, cooperative, denying suicidal ideation and able to engage in safety planning.      Risk of Harm    Is the patient experiencing current suicidal ideation: No    Does the patient have thoughts of harming others? No      Mental Status Exam   Affect: Appropriate   Appearance: Appropriate    Attention Span/Concentration: Attentive?    Eye Contact: Engaged   Fund of Knowledge: Appropriate    Language /Speech Content: Fluent   Language /Speech Volume: Normal    Language /Speech Rate/Productions: Normal    Recent Memory: Variable   Remote Memory: Variable   Mood: Normal    Orientation to Person: Yes    Orientation to Place: Yes   Orientation to Time of Day: Yes    Orientation to Date: Yes    Situation (Do they understand why they are here?): Yes    Psychomotor Behavior: Normal    Thought Content: Clear   Thought Form: Intact       Additional Collateral Information   No additional collateral      Therapeutic Intervention  The following therapeutic methodologies were employed when working with the patient: Establishing rapport, Active listening, Assess dimensions of crisis, Identify additional supports and alternative coping skills, Establish a discharge plan, Brief Supportive Therapy and Safety  planning. Patient response to intervention: receptive.      Disposition  Recommended disposition: Individual Therapy, Medication Management, Substance Abuse Disorder Treatment and Rule 25/SYD Assessment      Reviewed case and recommendations with attending provider. Attending Name: Dr. Roberts      Attending concurs with disposition: Yes      Patient concurs with disposition: Patient does not see the need for substance abuse treatment.      Final disposition: Medication management.       Clinical Substantiation of Recommendations  Patient denies suicidal ideation, SIB, HI, or hallucinations.  Patient is able to engage in safety planning.   Patient minimizes her alcohol use and has poor insight into her drinking.  Patient plans to start her already prescribed medication and follow up with a provider a [facility].  Patient denies chemical health resources needed, information provided on discharge information.      Assessment Details  Total duration spent on the patient case in minutes: 1.50 hrs     CPT code(s) utilized: 37273 - Psychotherapy for Crisis - 60 (30-74*) min       MARCEL Torres    [employer]-   Help in getting a cell phone  They currently have cell phones available. Come see them during walk-in hours Monday, Wednesday, or Friday from 12:00-2:00, or call them at [phone] to schedule an appointment  Address: [address] [place] [address] [address]  [place] [id number]     Beyond Backgrounds   Beyond Backgrounds makes it more likely that a landlord will relax their screening criteria and rent to you! When a landlord rents to you, they get up to $2,000 of financial assurance if something goes wrong like damage to the unit or legal fees.   https://housinglink.org/beyond-backgrounds     Housinglink.org  Help in finding housing in your price range and ideal location      Aftercare Plan  If I am feeling unsafe or I am in a crisis, I will:   Contact my established care providers   Call the  "National Suicide Prevention Lifeline: 988  Go to the nearest emergency room   Call 911   Warning signs that I or other people might notice when a crisis is developing for me: feeling mistreated/put down, having more disagreements with others, going into \"pity mode\"- crying, smoking.  Increased drinking     Things I am able to do on my own to cope or help me feel better: letting my emotions out/crying, take a shower/bath, reach out to someone, talking to God      Things that I am able to do with others to cope or help me better: play a game/cards, talk with someone, work, start going back to Alevism.      Things I can use or do for distraction: play a game, talk to someone, take shower/bath, work, talk to God, go for a walk.     Changes I can make to support my mental health and wellness: start taking my antidepressant, starting working again.  Focus on the life goals you wish to achieve and keep alcohol use from interfering with what you want from life.      People in my life that I can ask for help: God, friends, brother     Your [facility] has a mental health crisis team you can call 24/7: [facility] Crisis  [phone]     Other things that are important when I'm in crisis:      Reduce Extreme Emotion  QUICKLY:  Changing Your Body Chemistry      T:  Change your body Temperature to change your autonomic nervous system     Use Ice Water to calm yourself down FAST     Put your face in a bowl of ice water (this is the best way; have the person keep his/her face in ice water for 30-45 seconds - initial research is showing that the longer s/he can hold her/his face in the water, the better the response), or     Splash ice water on your face, or hold an ice pack on your face      I:  Intensely exercise to calm down a body revved up by emotion     Examples: running, walking fast, jumping, playing basketball, weight lifting, swimming, calisthenics, etc.     Engage in exercises that DO NOT include violent behaviors. " Exercises that utilize violent behaviors tend to function as  behavioral rehearsal,  and rather than calming the person down, may actually  rev  the person up more, increasing the likelihood of violence, and lessening the likelihood that they will  burn off  energy     P:  Progressively relax your muscles     Starting with your hands, moving to your forearms, upper arms, shoulders, neck, forehead, eyes, cheeks and lips, tongue and teeth, chest, upper back, stomach, buttocks, thighs, calves, ankles, feet     Tense (10 seconds,   of the way), then relax each muscle (all the way)     Notice the tension     Notice the difference when relaxed (by tensing first, and then relaxing, you are able to get a more thorough relaxation than by simply relaxing)      P: Paced breathing to relax     The standard technique is to begin with counting the number of steps one takes for a typical inhale, then counting the steps one takes for a typical exhale, and then lengthening the amount of steps for the exhalation by one or two steps.  OR    Repeat this pattern for 1-2 minutes    Inhale for four (4) seconds     Exhale for six (6) to eight (8) seconds     Research demonstrated that one can change one's overall level of anxiety by doing this exercise for even a few minutes per day        Additional resources and information:      *[place] CD Intake  (type CD intake in the search field)  www.[employer].org  [phone]   inpatient services [phone]  or [phone] To arrange an appointment with CD counselor   [facility]  [email or web address]  [phone] Hours vary, call for information  Rule 25 assessments  Counseling & assessments  For CD & outpatient treatment   [place]  Te[place] Challenge ([place])  http://mntc.org    [phone] [address], \[facility]\""  Residential drug and alcohol programs serving teens and adults from all ethnic, socioeconomic and Quaker backgrounds         AA                              "        [phone]                        [place] and [facility] site  http://www.aastpaul.org/           Crisis Lines  Crisis Text Line  Text 281699  You will be connected with a trained live crisis counselor to provide support.     Saima stover, khurramo  JAYJAY a 950058 o texto a 442-AYUDAME en WhatsApp     The Robert Project (LGBTQ Youth Crisis Line)  1.015.857.7019  text START to 096-851        [employer]  Fast Tracker  Linking people to mental health and substance use disorder resources  Hammerless.ebindle      [place] Mental Health Warm Line  Peer to peer support  Monday thru Saturday, 12 pm to 10 pm  031.547.1383 or 2.103.690.8187  Text \"Support\" to 96855     National [place] on Mental Illness (SUKUMAR)  643.022.0834 or 1.888.SUKUMAR.HELPS        Mental Health Apps  My3  https://Exajoule.org/     VirtualHopeBox  https://Silicon Republic/apps/virtual-hope-box/        Additional Information  Today you were seen by a licensed mental health professional through Triage and Transition services, [facility]iders ([facility])  for a crisis assessment in the Emergency Department at [facility].  It is recommended that you follow up with your established providers (psychiatrist, mental health therapist, and/or primary care doctor - as relevant) as soon as possible. Coordinators from [facility] will be calling you in the next 24-48 hours to ensure that you have the resources you need.  You can also contact [facility] coordinators directly at [phone]. You may have been scheduled for or offered an appointment with a mental health provider. [facility] maintains an extensive network of licensed behavioral health providers to connect patients with the services they need.  We do not charge providers a fee to participate in our referral network.  We match patients with providers based on a patient's specific needs, insurance coverage, and location.  Our first effort will be to refer you to a provider within your care " system, and will utilize providers outside your care system as needed.

## 2022-09-02 NOTE — ED NOTES
Pt took home meds, including losartan potassium, carvedolol, magnesium, and xarelto. MD kowalski aware

## 2022-09-02 NOTE — TELEPHONE ENCOUNTER
0531 Bed Search update:    [facility]-No beds available  [facility]-No beds available  [facility] [place]-No beds available  [facility]-No beds available  [facility]-No beds available  [facility]-No beds available  [facility]-No beds available  [facility]-No beds available  [facility]-No beds available  [facility]-No beds available  [facility]-No beds available.  Must be on a 72 HH. No intake after 10 PM.  [facility]-No beds available  [facility]untary/[place] [facility] only. No hx violence or sexual assault.  [facility]-No beds available  [facility]-No beds available  [facility]-No recent hx violence/aggression. Must be able to program in groups.  [facility]-Low acuity only.  [facility]-No beds available.  No recent violence or aggression.  [facility]-No beds available  [facility]-No beds available    0534 Sosa at [facility] reported pt is considered high acuity at their facility.    0536 Dustin at [facility] reporting there is bed availability at [facility] but pt's BAL would need to be below 0.08 before they can review.    Remains on wait list.

## 2022-09-02 NOTE — CONSULTS
See dictation. #[id number]  Psychiatry service   Initial Consultation--completed    Lana Pappas MD  9/2/2022

## 2022-09-02 NOTE — TELEPHONE ENCOUNTER
R: Pt currently at [facility] awaiting [facility] placement    1100 Per chart review patient is requesting DEC reassessment for potential discharge. Intake called RN (Roxanna) who confirmed request for reassessment.    1105 Intake messaged (Patricia) from [facility] in regards to reassessment.. Intake awaiting response.     1316 Per extended care (Patricia) patient will be discharging home with outpatient services. Intake removed patient from worklist, intake is no longer actively seeking [facility] placement.

## 2022-09-02 NOTE — ED NOTES
Patient is sleeping. Non labored breathing. Moving self in bed. Every 15 minute safety checks for patient's safety.

## 2022-09-02 NOTE — CONSULTS
Consult Date: 09/02/2022    IDENTIFICATION:  The patient is a 51-year-old single -American female known to service, who is seen at the request of Dr. Steffi Marcos for evaluation of suicidal ideation in this patient admitted in an intoxicated state.    HISTORY OF PRESENT ILLNESS:  The patient did require restraint for agitation after admission and was seen in the [facility] Emergency Department.  She had a BAL of 0.25, negative urine tox screen, normal electrolyte panel, borderline potassium 3.2.  She was hypertensive and admittedly was offered antihypertensive agents.  She remembers playing cards with her friends with whom she lives, but has little recall, other than a  of a friend yelling at her.  On examination in ED, she did have a bottle of vodka, apparently was appearing to want to take a drink.  She did require IM Zyprexa administration x 1 and a Code 21 when she had escalated and started swinging at Security.  She has subsequently become lucid, was sitting in the ED.  Did take her home medications from her backpack in front of nurse.  She states she is drinking twice weekly.  Seemed surprised at her blood alcohol level.  I did discuss the health risks of continued drinking, and she seemed surprised at recommendation of no more than 2 social drinks per week.  She appeared to downplay her drinking, but did state that her friends drink, thus it has been harder to avoid, but she has no money for alcohol herself.  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  Is meeting with .  She had lost her purse with ID and needs transportation also.  She would also like to look into housing options for eventual relocation.  She denies feeling at risk from her friends who are on the street.  She does contract for safety, has little recall of making a suicidal statement and adamantly denies significant depression.  She did go off of the Lexapro prescribed at last hospitalization.  She is  willing to discuss this again with her [facility] nurse practitioner at next visit, but does not feel a compelling need to restart an antidepressant.  No self-injurious behavior reported.    PAST PSYCHIATRIC HISTORY:  No history of suicide attempts, manic or psychotic episodes, hospitalizations, commitments, or past trials, other than Lexapro above.  No history of ADD, OCD, eating disorder, although she has obesity.    PAST MEDICAL HISTORY:  Significant for hypertension, obesity.  No history of closed head trauma, seizure or other major medical issues, outside of DVT and is on Xarelto.  She takes p.r.n. Robaxin for back pain.    ALLERGIES:  NO KNOWN DRUG ALLERGIES.    VITAL SIGNS:  /104, temperature 98, pulse 81, respiratory rate 18, weight 97 kg.    FAMILY HISTORY:  No known mental illness, chemical dependency or suicides.    SOCIAL HISTORY:  The patient is unmarried, officially homeless, but stays with friends for the past 2 months.  They apparently drink, but has not been threatening.  She had worked as a , lost her job and this spring, possibly for absenteeism.  She may also have been observed intoxicated or hung over at work.  She denies legal problems.  She has a son living in [place], who is doing well.  Her brother, Emre, had been her only support, and she is somewhat estranged from him.    REVIEW OF SYSTEMS:  No recent fall, injury, flu-like symptoms, chest pain or shortness of breath, otherwise systems are negative.    MENTAL STATUS EXAMINATION:  The patient is a heavyset -American dressed in street clothes, sitting up, remained calm in affect.  She was pleasant, conversant, no word finding difficulties or memory disturbance, outside of her episode of intoxication.  She appeared to be minimizing her substance use.  Fair insight.  Judgment reasonable as she is agreeing to abide by medical recommendations.  No hallucinations and adamantly denies suicidal ideation,  hopelessness, death wish or thoughts of harm to others.  Her speech was nonpressured and fluent.  No obvious tremor.  Gait not observed, but no recent falls.  She maintained good attention.  She appeared focus on her needs.    IMPRESSION:  Alcoholism with some noncompliance of her medication and has Social Service needs.  Appears to be having no ongoing dangerousness to self.    DIAGNOSES:     1.  Alcohol use disorder.  No significant history of withdrawal or seizure.   2.  Unspecified depressive disorder, stable.    PLAN:  The patient agrees to limit alcohol use to 2 drinks per week, but appears to be struggling with some insight into her need for full sobriety.  She agrees to see her nurse practitioner at [facility] and follow all medical recommendations.  She would like to hold on restarting the Lexapro at this time until she follows up with her outpatient provider.  She declines other referrals. She can be released to her friend's home after seeing .  Case discussed with the ED hospital staff.  Please call with questions and concerns and thank you for consultation.     Lana Pappas MD        D: 2022   T: 2022   MT: MARNIEMT    Name:     BRITTNI YEH  MRN:      [id number]        Account:      [id number]   :      1970           Consult Date: 2022     Document: [id number]    cc:  Steffi Marcos MD

## 2022-09-02 NOTE — ED PROVIDER NOTES
Jose Corral is a 50 y/o female signed out to me by Dr. Florentino awaiting psychiatric assessment with concerns for SI and anxiety, currently homeless, and intoxicated on arrival. She is now clinically sober and denying SI. She can stay with a friend. She was assessed by DEC, then psychiatry and again DEC, now agreeing that she is safe for discharge home. She is going to start her antidepressant and f/u with outpatient mental health resources for further care. She is contracted for safety and will return with worsening. She is discharged in stable condition.      Vicky Smith MD  09/02/22 1820

## 2022-10-04 ENCOUNTER — HOSPITAL ENCOUNTER (EMERGENCY)
Facility: CLINIC | Age: 52
Discharge: HOME | End: 2022-10-04
Attending: EMERGENCY MEDICINE | Admitting: EMERGENCY MEDICINE
Payer: COMMERCIAL

## 2022-10-04 VITALS
HEIGHT: 62 IN | RESPIRATION RATE: 16 BRPM | TEMPERATURE: 97.3 F | HEART RATE: 100 BPM | DIASTOLIC BLOOD PRESSURE: 121 MMHG | WEIGHT: 230 LBS | SYSTOLIC BLOOD PRESSURE: 212 MMHG | BODY MASS INDEX: 42.33 KG/M2 | OXYGEN SATURATION: 100 %

## 2022-10-04 DIAGNOSIS — R03.0 ELEVATED BLOOD PRESSURE READING: ICD-10-CM

## 2022-10-04 DIAGNOSIS — R04.0 BLEEDING FROM THE NOSE: ICD-10-CM

## 2022-10-04 LAB
ALBUMIN SERPL BCG-MCNC: 3.9 G/DL (ref 3.5–5.2)
ALP SERPL-CCNC: 90 U/L (ref 35–104)
ALT SERPL W P-5'-P-CCNC: 13 U/L (ref 10–35)
ANION GAP SERPL CALCULATED.3IONS-SCNC: 18 MMOL/L (ref 7–15)
AST SERPL W P-5'-P-CCNC: 31 U/L (ref 10–35)
BASOPHILS # BLD AUTO: 0 10E3/UL (ref 0–0.2)
BASOPHILS NFR BLD AUTO: 1 %
BILIRUB SERPL-MCNC: 0.2 MG/DL
BUN SERPL-MCNC: 9 MG/DL (ref 6–20)
CALCIUM SERPL-MCNC: 9 MG/DL (ref 8.6–10)
CHLORIDE SERPL-SCNC: 102 MMOL/L (ref 98–107)
CREAT SERPL-MCNC: 0.67 MG/DL (ref 0.51–0.95)
DEPRECATED HCO3 PLAS-SCNC: 22 MMOL/L (ref 22–29)
EOSINOPHIL # BLD AUTO: 0.1 10E3/UL (ref 0–0.7)
EOSINOPHIL NFR BLD AUTO: 2 %
ERYTHROCYTE [DISTWIDTH] IN BLOOD BY AUTOMATED COUNT: 17.8 % (ref 10–15)
GFR SERPL CREATININE-BSD FRML MDRD: >90 ML/MIN/1.73M2
GLUCOSE SERPL-MCNC: 76 MG/DL (ref 70–99)
HCT VFR BLD AUTO: 33.7 % (ref 35–47)
HGB BLD-MCNC: 11.6 G/DL (ref 11.7–15.7)
HOLD SPECIMEN: NORMAL
IMM GRANULOCYTES # BLD: 0 10E3/UL
IMM GRANULOCYTES NFR BLD: 0 %
LYMPHOCYTES # BLD AUTO: 1.3 10E3/UL (ref 0.8–5.3)
LYMPHOCYTES NFR BLD AUTO: 26 %
MCH RBC QN AUTO: 29.6 PG (ref 26.5–33)
MCHC RBC AUTO-ENTMCNC: 34.4 G/DL (ref 31.5–36.5)
MCV RBC AUTO: 86 FL (ref 78–100)
MONOCYTES # BLD AUTO: 0.4 10E3/UL (ref 0–1.3)
MONOCYTES NFR BLD AUTO: 8 %
NEUTROPHILS # BLD AUTO: 3.3 10E3/UL (ref 1.6–8.3)
NEUTROPHILS NFR BLD AUTO: 63 %
NRBC # BLD AUTO: 0 10E3/UL
NRBC BLD AUTO-RTO: 0 /100
PLATELET # BLD AUTO: 345 10E3/UL (ref 150–450)
POTASSIUM SERPL-SCNC: 3.7 MMOL/L (ref 3.4–5.3)
PROT SERPL-MCNC: 7.7 G/DL (ref 6.4–8.3)
RBC # BLD AUTO: 3.92 10E6/UL (ref 3.8–5.2)
SODIUM SERPL-SCNC: 142 MMOL/L (ref 136–145)
WBC # BLD AUTO: 5.2 10E3/UL (ref 4–11)

## 2022-10-04 PROCEDURE — 250N000013 HC RX MED GY IP 250 OP 250 PS 637: Performed by: EMERGENCY MEDICINE

## 2022-10-04 PROCEDURE — 99284 EMERGENCY DEPT VISIT MOD MDM: CPT | Mod: 25

## 2022-10-04 PROCEDURE — 36415 COLL VENOUS BLD VENIPUNCTURE: CPT | Performed by: EMERGENCY MEDICINE

## 2022-10-04 PROCEDURE — 250N000011 HC RX IP 250 OP 636: Performed by: EMERGENCY MEDICINE

## 2022-10-04 PROCEDURE — 30901 CONTROL OF NOSEBLEED: CPT | Mod: 50

## 2022-10-04 PROCEDURE — 96374 THER/PROPH/DIAG INJ IV PUSH: CPT

## 2022-10-04 PROCEDURE — 85004 AUTOMATED DIFF WBC COUNT: CPT | Performed by: EMERGENCY MEDICINE

## 2022-10-04 PROCEDURE — 80053 COMPREHEN METABOLIC PANEL: CPT | Performed by: EMERGENCY MEDICINE

## 2022-10-04 PROCEDURE — 93005 ELECTROCARDIOGRAM TRACING: CPT | Mod: XU

## 2022-10-04 RX ORDER — ONDANSETRON 2 MG/ML
4 INJECTION INTRAMUSCULAR; INTRAVENOUS ONCE
Status: COMPLETED | OUTPATIENT
Start: 2022-10-04 | End: 2022-10-04

## 2022-10-04 RX ORDER — ACETAMINOPHEN 325 MG/1
975 TABLET ORAL ONCE
Status: COMPLETED | OUTPATIENT
Start: 2022-10-04 | End: 2022-10-04

## 2022-10-04 RX ORDER — ACETAMINOPHEN 500 MG
1000 TABLET ORAL ONCE
Status: DISCONTINUED | OUTPATIENT
Start: 2022-10-04 | End: 2022-10-04

## 2022-10-04 RX ADMIN — ONDANSETRON 4 MG: 2 INJECTION INTRAMUSCULAR; INTRAVENOUS at 14:57

## 2022-10-04 RX ADMIN — ACETAMINOPHEN 975 MG: 325 TABLET, FILM COATED ORAL at 15:39

## 2022-10-04 ASSESSMENT — ENCOUNTER SYMPTOMS
ABDOMINAL PAIN: 0
ARTHRALGIAS: 1

## 2022-10-04 ASSESSMENT — ACTIVITIES OF DAILY LIVING (ADL): ADLS_ACUITY_SCORE: 35

## 2022-10-04 NOTE — ED PROVIDER NOTES
Rapid Assessment Note    History:   Jose Corral is a 51 year old female who presents with nosebleed that started earlier today, while on the toilet.  911 was called, bleeding stopped, the patient had other symptoms and wanted to be seen.  Reportedly hypotensive at the time of the bleed, historically is on blood thinners per the chart, but tells me she ran out of all of her medications about a month ago and is not taking her Xarelto.    Bleeding was stopped, then she began to cough and blow her nose and that waiting room, bleeding restarted, a second nasal clamp was placed with good hemostasis, then patient picked the clip off and blew her nose again, with bleeding thereafter.    Denies any numbness or tingling or weakness, does endorse some chronic complaints as well but is here specifically for the nosebleed.    Exam:   General:  Alert, interactive  Cardiovascular:  Well perfused  Lungs:  No respiratory distress, no accessory muscle use  Neuro:  Moving all 4 extremities  Skin:  Warm, dry  Psych:  Normal affect    Plan of Care:   I evaluated the patient and developed an initial plan of care. I discussed this plan and explained that I, or one of my partners, would be returning to complete the evaluation.       10/4/2022  [employer]    Portions of this medical record were completed by a scribe. UPON MY REVIEW AND AUTHENTICATION BY ELECTRONIC SIGNATURE, this confirms (a) I performed the applicable clinical services, and (b) the record is accurate.        Roberto Stewart MD  10/04/22 2076

## 2022-10-04 NOTE — ED NOTES
Pt. Continues to remove clip. Pt. Re educated on leaving the clip in place, verbalizes understanding.

## 2022-10-04 NOTE — DISCHARGE INSTRUCTIONS
As we discussed, your nosebleed was controlled here in the ER, please do not blow your nose for the next 48 hours if possible.  Please come back to the ER immediately if you have any worsening bleeding that does not get better within 10-15 minutes of direct compression with a nasal pincer that we gave you.  I think your bleeding is likely possibly related to the elevated blood pressure you had as the medics read your blood pressure has been very high at that time and high blood pressure is a known cause of bleeding noses.  You are not bleeding now and blood pressures in the come down, and I think blood pressure was high earlier today because you are not taking the medication as opposed to control it.  Please take your losartan and Coreg as we discussed, and go to the [facility] in [place] that I have attached your paperwork.  It is very important that your chronic issues are dealt with as well, and seeing a doctor to help get back on your preventative medications including your blood thinner for your blood clot is certainly one of the most important things for you to do.  Come back to the ER immediately with any other concerns you have any be absolutely certain to see the regular doctor that we discussed, and his information is here to paperwork in the next 1 week

## 2022-10-04 NOTE — ED TRIAGE NOTES
"Pt presents for complaint of a nose bleed that started 1.5 hours ago and stopped spontaneously 30 minutes ago. Pt called EMS due to the nose bleed. EMS reports the nose bleed had stopped by the time they had arrives. They did note the patient had high blood pressure that was noted to be 220 systolic. Pt has a hx of hypertension and stated to them she had been using her medications intermittently due to concerns for running out. Pt states she also has a headache, left shoulder pain and dizziness that all started at the same time as the nose bleed. Pt states \"I think I'm having a stroke\". No signs of BFAST deficits noted during triage. ABC intact, A&Ox4. Pt additionally notes she is homeless.     Triage Assessment     Row Name 10/04/22 1233       Triage Assessment (Adult)    Airway WDL WDL       Respiratory WDL    Respiratory WDL WDL       Skin Circulation/Temperature WDL    Skin Circulation/Temperature WDL WDL       Cardiac WDL    Cardiac WDL WDL       Peripheral/Neurovascular WDL    Peripheral Neurovascular WDL WDL       Cognitive/Neuro/Behavioral WDL    Cognitive/Neuro/Behavioral WDL WDL              "

## 2022-10-04 NOTE — ED PROVIDER NOTES
History     Chief Complaint:  Epistaxis       JOE Corral is a 51 year old female who presents with nosebleed that started earlier today, while on the toilet.  911 was called, bleeding stopped, the patient had other symptoms and wanted to be seen.  Reportedly hypotensive at the time of the bleed, historically is on blood thinners per the chart, but tells me she ran out of all of her medications about a month ago and is not taking her Xarelto.    Bleeding was stopped, then she began to cough and blow her nose and that waiting room, bleeding restarted, a second nasal clamp was placed with good hemostasis, then patient picked the clip off and blew her nose again, with bleeding thereafter.    Denies any numbness or tingling or weakness, does endorse some chronic complaints as well but is here specifically for the nosebleed.    ROS:  Review of Systems   Cardiovascular: Positive for leg swelling. Negative for chest pain.   Gastrointestinal: Negative for abdominal pain.   Musculoskeletal: Positive for arthralgias.   All other systems reviewed and are negative.       Allergies:  No Known Allergies     Medications:    acetaminophen (TYLENOL) 500 MG tablet  carvedilol (COREG) 25 MG tablet  furosemide (LASIX) 20 MG tablet  losartan (COZAAR) 100 MG tablet  magnesium oxide (MAG-OX) 400 MG tablet  methocarbamol (ROBAXIN) 500 MG tablet  potassium chloride ER (K-TAB) 20 MEQ CR tablet  rivaroxaban ANTICOAGULANT (XARELTO) 20 MG TABS tablet  rivaroxaban ANTICOAGULANT (XARELTO) 20 MG TABS tablet        Past Medical History:    Past Medical History:   Diagnosis Date     Alcohol abuse      Diabetes (H)        Past Surgical History:    No past surgical history on file.     Family History:    family history includes Cerebrovascular Disease in her paternal grandmother; Diabetes in her mother and paternal grandmother; Heart Disease in her mother; Unknown/Adopted in her father.    Social History:   reports that she has been  "smoking. She has never used smokeless tobacco. She reports current alcohol use. She reports previous drug use.  PCP: No Ref-Primary, Physician     Physical Exam     Patient Vitals for the past 24 hrs:   BP Temp Pulse Resp SpO2 Height Weight   10/04/22 1608 (!) 212/121 -- 100 16 100 % -- --   10/04/22 1239 (!) 188/115 -- -- -- -- -- --   10/04/22 1232 -- 97.3  F (36.3  C) 95 16 98 % 1.575 m (5' 2\") 104.3 kg (230 lb)        Physical Exam  Vitals: reviewed by me  General: Pt seen on [facility], cooperative, and alert to conversation  Eyes: Tracking well, clear conjunctiva BL  ENT: MMM, midline trachea.  No immediate bleeding noted when nasal pincers removed, slight dried blood noted in both naris.  Posterior oropharynx unremarkable.  Lungs: No tachypnea, no accessory muscle use. No respiratory distress.   CV: Rate as above  Abd: Soft, non tender, no guarding, no rebound. Non distended  MSK: no joint effusion.  No evidence of trauma, does have a compression stocking on her right leg, no tenderness to the swelling, chronic appearing  Skin: No rash  Neuro: Clear speech and no facial droop.  Moving all extremity spontaneously, following all commands, ambulatory with strong steady gait.  Psych: Not RIS, no e/o AH/VH      Emergency Department Course   ECG:  ECG results from 07/07/22   EKG 12-lead, tracing only     Value    Systolic Blood Pressure     Diastolic Blood Pressure     Ventricular Rate 96    Atrial Rate 96    SD Interval 140    QRS Duration 82        QTc 540    P Axis 41    R AXIS 16    T Axis 25    Interpretation ECG      Sinus rhythm  Moderate voltage criteria for LVH, may be normal variant  Nonspecific T wave abnormality  Prolonged QT  Abnormal ECG  When compared with ECG of 18-APR-2022 07:54,  QT has lengthened  Confirmed by - EMERGENCY ROOM, PHYSICIAN ([id number]),  RASHMI JUAREZ (Rm) on 7/7/2022 11:25:41 AM         Laboratory:  Labs Ordered and Resulted from Time of ED Arrival to Time " of ED Departure   COMPREHENSIVE METABOLIC PANEL - Abnormal       Result Value    Sodium 142      Potassium 3.7      Chloride 102      Carbon Dioxide (CO2) 22      Anion Gap 18 (*)     Urea Nitrogen 9.0      Creatinine 0.67      Calcium 9.0      Glucose 76      Alkaline Phosphatase 90      AST 31      ALT 13      Protein Total 7.7      Albumin 3.9      Bilirubin Total 0.2      GFR Estimate >90     CBC WITH PLATELETS AND DIFFERENTIAL - Abnormal    WBC Count 5.2      RBC Count 3.92      Hemoglobin 11.6 (*)     Hematocrit 33.7 (*)     MCV 86      MCH 29.6      MCHC 34.4      RDW 17.8 (*)     Platelet Count 345      % Neutrophils 63      % Lymphocytes 26      % Monocytes 8      % Eosinophils 2      % Basophils 1      % Immature Granulocytes 0      NRBCs per 100 WBC 0      Absolute Neutrophils 3.3      Absolute Lymphocytes 1.3      Absolute Monocytes 0.4      Absolute Eosinophils 0.1      Absolute Basophils 0.0      Absolute Immature Granulocytes 0.0      Absolute NRBCs 0.0            Emergency Department Course:    Reviewed:  I reviewed nursing notes, vitals and past medical history        Interventions:  Medications   ondansetron (ZOFRAN) injection 4 mg (4 mg Intravenous Given 10/4/22 7907)   acetaminophen (TYLENOL) tablet 975 mg (975 mg Oral Given 10/4/22 1539)        Disposition:  The patient was discharged to home.     Impression & Plan      Medical Decision Making:  This is a very pleasant 51-year-old female who presents emergency room with what appears to be a nosebleed earlier today.  We were able to control her nosebleed with direct compression that we had to do the several times as she continued to blow her nose.  Thankfully now, she has no longer blowing her nose, and the bleeding is stopped.  On my reassessment at time of discharge, she has been without any evidence of bleeding for some time, and I am able to sit and talk to her more, and it sounds like She does have all of her home medication she simply has  stopped taking them out of fear that she would run out eventually as she does not have a regular doctor.  This would explain why her blood pressure is still high as she is on at least 2 and possibly 3 antihypertensives, which she has with her and I told her that I believe that she should start taking this again immediately to prevent any additional nosebleeds from happening.    She does have a history of a blood clot in her right leg for she is intermittently taking her Xarelto, tells me she has not taken any for the last several weeks fearing that she may run out.  She tells me she does not have a phone today cannot do a primary care referral for her, but I did give her the address to the [facility] and have asked her to call and make an appointment using her friend's phone or to go in person and make an appointment.  Given her comorbidities including her DVT, and her very high blood pressure today, I am concerned that her chronic issues may become acute issues if she continues not to take her medications.  I have asked her to begin taking her home medications here since she is able to show me them and has what appear to be several weeks worth of her medications left in her backpack.  She is amenable to this plan, and I underscored her important is to follow with the [facility] as above in the next week to establish care here in [place].  Red flags for contact to the ER were discussed in detail, patient okay with this plan    4:38 PM  Reviewing the medical record, I was unaware of the discharge blood pressure of systolic 212mmhg- was done after I had spoken to and given RTED precautions (pt was asymptomatic), but prior to RN discharge.  Previous recorded blood pressure was in the 180s.  Throughout my interview and exam the patient, I did not see any evidence of hypertensive emergency, and all of her symptoms had resolved here, and furthermore we did have a plan to address her high blood  pressure in the form of taking medications that she showed me in her backpack starting today.  However I would have like to have a longer conversation with her since her blood pressure was back up over 200.  The patient does not have a phone, and I am unable to reach her at this time.    Diagnosis:    ICD-10-CM    1. Bleeding from the nose  R04.0    2. Elevated blood pressure reading  R03.0         Discharge Medications:  Discharge Medication List as of 10/4/2022  3:56 PM           10/4/2022   No att. providers found        Roberto Stewart MD  10/04/22 1418

## 2022-10-04 NOTE — ED NOTES
Bloody nose stopped, pt. Provided with meal tray and drink per request. Pt. Reports she needs to be admitted because she has nowhere to stay and is afraid she will get a bloody nose again. Pt. Educated on admission requirements. Pt. Reports she has a friend that can come and get her and provide her housing after 1730. SW services offered to patient when patient asked to be set up with housing, pt. Declined SW visit, reports she already spoke with one and all they gave her was a pamphlet.

## 2022-10-05 ENCOUNTER — HOSPITAL ENCOUNTER (EMERGENCY)
Facility: CLINIC | Age: 52
Discharge: HOME | End: 2022-10-05
Attending: EMERGENCY MEDICINE | Admitting: EMERGENCY MEDICINE
Payer: COMMERCIAL

## 2022-10-05 VITALS
SYSTOLIC BLOOD PRESSURE: 155 MMHG | RESPIRATION RATE: 18 BRPM | TEMPERATURE: 97.8 F | HEART RATE: 104 BPM | OXYGEN SATURATION: 94 % | DIASTOLIC BLOOD PRESSURE: 93 MMHG

## 2022-10-05 DIAGNOSIS — F41.9 ANXIETY: ICD-10-CM

## 2022-10-05 DIAGNOSIS — F10.10 ALCOHOL ABUSE: ICD-10-CM

## 2022-10-05 LAB
ANION GAP SERPL CALCULATED.3IONS-SCNC: 25 MMOL/L (ref 7–15)
BASOPHILS # BLD AUTO: 0 10E3/UL (ref 0–0.2)
BASOPHILS NFR BLD AUTO: 1 %
BUN SERPL-MCNC: 17.5 MG/DL (ref 6–20)
CALCIUM SERPL-MCNC: 8.7 MG/DL (ref 8.6–10)
CHLORIDE SERPL-SCNC: 101 MMOL/L (ref 98–107)
CREAT SERPL-MCNC: 0.78 MG/DL (ref 0.51–0.95)
DEPRECATED HCO3 PLAS-SCNC: 16 MMOL/L (ref 22–29)
EOSINOPHIL # BLD AUTO: 0 10E3/UL (ref 0–0.7)
EOSINOPHIL NFR BLD AUTO: 0 %
ERYTHROCYTE [DISTWIDTH] IN BLOOD BY AUTOMATED COUNT: 17.8 % (ref 10–15)
ETHANOL SERPL-MCNC: 0.44 G/DL
GFR SERPL CREATININE-BSD FRML MDRD: >90 ML/MIN/1.73M2
GLUCOSE SERPL-MCNC: 61 MG/DL (ref 70–99)
HCG SERPL QL: NEGATIVE
HCT VFR BLD AUTO: 35.2 % (ref 35–47)
HGB BLD-MCNC: 11.8 G/DL (ref 11.7–15.7)
IMM GRANULOCYTES # BLD: 0 10E3/UL
IMM GRANULOCYTES NFR BLD: 0 %
LYMPHOCYTES # BLD AUTO: 1.4 10E3/UL (ref 0.8–5.3)
LYMPHOCYTES NFR BLD AUTO: 25 %
MCH RBC QN AUTO: 29.6 PG (ref 26.5–33)
MCHC RBC AUTO-ENTMCNC: 33.5 G/DL (ref 31.5–36.5)
MCV RBC AUTO: 88 FL (ref 78–100)
MONOCYTES # BLD AUTO: 0.4 10E3/UL (ref 0–1.3)
MONOCYTES NFR BLD AUTO: 8 %
NEUTROPHILS # BLD AUTO: 3.6 10E3/UL (ref 1.6–8.3)
NEUTROPHILS NFR BLD AUTO: 66 %
NRBC # BLD AUTO: 0 10E3/UL
NRBC BLD AUTO-RTO: 0 /100
PLATELET # BLD AUTO: 354 10E3/UL (ref 150–450)
POTASSIUM SERPL-SCNC: 3.5 MMOL/L (ref 3.4–5.3)
RBC # BLD AUTO: 3.99 10E6/UL (ref 3.8–5.2)
SODIUM SERPL-SCNC: 142 MMOL/L (ref 136–145)
WBC # BLD AUTO: 5.5 10E3/UL (ref 4–11)

## 2022-10-05 PROCEDURE — 82077 ASSAY SPEC XCP UR&BREATH IA: CPT | Performed by: EMERGENCY MEDICINE

## 2022-10-05 PROCEDURE — 90791 PSYCH DIAGNOSTIC EVALUATION: CPT

## 2022-10-05 PROCEDURE — 250N000013 HC RX MED GY IP 250 OP 250 PS 637: Performed by: EMERGENCY MEDICINE

## 2022-10-05 PROCEDURE — 36415 COLL VENOUS BLD VENIPUNCTURE: CPT | Performed by: EMERGENCY MEDICINE

## 2022-10-05 PROCEDURE — 84703 CHORIONIC GONADOTROPIN ASSAY: CPT | Performed by: EMERGENCY MEDICINE

## 2022-10-05 PROCEDURE — 80048 BASIC METABOLIC PNL TOTAL CA: CPT | Performed by: EMERGENCY MEDICINE

## 2022-10-05 PROCEDURE — 99285 EMERGENCY DEPT VISIT HI MDM: CPT | Mod: 25

## 2022-10-05 PROCEDURE — 85025 COMPLETE CBC W/AUTO DIFF WBC: CPT | Performed by: EMERGENCY MEDICINE

## 2022-10-05 RX ORDER — LOSARTAN POTASSIUM 100 MG/1
100 TABLET ORAL DAILY
Status: DISCONTINUED | OUTPATIENT
Start: 2022-10-05 | End: 2022-10-05 | Stop reason: HOSPADM

## 2022-10-05 RX ORDER — CARVEDILOL 6.25 MG/1
25 TABLET ORAL 2 TIMES DAILY WITH MEALS
Status: DISCONTINUED | OUTPATIENT
Start: 2022-10-05 | End: 2022-10-05 | Stop reason: HOSPADM

## 2022-10-05 RX ADMIN — LOSARTAN POTASSIUM 100 MG: 100 TABLET, FILM COATED ORAL at 09:27

## 2022-10-05 RX ADMIN — CARVEDILOL 25 MG: 6.25 TABLET, FILM COATED ORAL at 09:26

## 2022-10-05 ASSESSMENT — ACTIVITIES OF DAILY LIVING (ADL)
ADLS_ACUITY_SCORE: 35

## 2022-10-05 NOTE — ED PROVIDER NOTES
History   Chief Complaint:  Epistaxis       The history is limited by the condition of the patient.      Jose Corral is a 51 year old female with history of diabetes mellitus, hypertension, and asthma who presents with mental health concern. The patient was seen yesterday in the ED for her nosebleed, which was stopped and she was discharged. She states that this has not completely resolved and she had more bleeding today because she has been blowing her nose. She states that the main reason she is here today is that she has nowhere to stay and is tired of people mistreating her. The patient states she is here for mental health reasons and arrived to the ED with a bottle of vodka. The patient states she was staying at a friend's place and that she needs her son, although she has no way of contacting him. She confirms that she has not been taking her Xarelto.     Review of Systems   Unable to perform ROS: Mental status change     Allergies:  The patient has no known allergies.     Medications:  Coreg  Lasix  Cozaar  Norvasc  Xarelto  Toprol     Past Medical History:     DUB  Carpal tunnel  Allergic rhinitis  Asthma  Diabetes mellitus  Hypertension  Obesity  Sarcoidosis  Subdural hematoma  Trichomonal vaginitis  Palpitations  Suicidal ideation  Depression  Hypokalemia  Hypomagnesemia  Pneumonia  Peripheral ededma     Surgical History:  Cholecystectomy     Family History:    Heart Disease  Diabetes  Hypertension    Social History:  The patient presents to the ED alone.  Reports alcohol use, arrived to ED with alcohol.    Physical Exam     Patient Vitals for the past 24 hrs:   BP Temp Temp src Pulse Resp SpO2   10/05/22 0815 (!) 155/93 -- -- -- -- 94 %   10/05/22 0702 (!) 153/95 97.8  F (36.6  C) Temporal 104 18 99 %       Physical Exam  Constitutional:       General: She is not in acute distress.     Appearance: She is not diaphoretic.      Comments: Disheveled and intoxicated   HENT:      Head: Atraumatic.       Right Ear: Tympanic membrane, ear canal and external ear normal.      Left Ear: Tympanic membrane, ear canal and external ear normal.      Mouth/Throat:      Pharynx: No oropharyngeal exudate.      Comments: No epistaxis  Eyes:      General: No scleral icterus.     Pupils: Pupils are equal, round, and reactive to light.   Cardiovascular:      Rate and Rhythm: Normal rate.   Pulmonary:      Effort: No respiratory distress.      Breath sounds: Normal breath sounds.   Abdominal:      Palpations: Abdomen is soft.      Tenderness: There is no abdominal tenderness.   Musculoskeletal:         General: No tenderness.   Skin:     General: Skin is warm.      Findings: No rash.   Neurological:      General: No focal deficit present.      Comments: Grossly moves all extremities well         Emergency Department Course     Laboratory:  Labs Ordered and Resulted from Time of ED Arrival to Time of ED Departure   BASIC METABOLIC PANEL - Abnormal       Result Value    Sodium 142      Potassium 3.5      Chloride 101      Carbon Dioxide (CO2) 16 (*)     Anion Gap 25 (*)     Urea Nitrogen 17.5      Creatinine 0.78      Calcium 8.7      Glucose 61 (*)     GFR Estimate >90     ETHYL ALCOHOL LEVEL - Abnormal    Alcohol ethyl 0.44 (*)    CBC WITH PLATELETS AND DIFFERENTIAL - Abnormal    WBC Count 5.5      RBC Count 3.99      Hemoglobin 11.8      Hematocrit 35.2      MCV 88      MCH 29.6      MCHC 33.5      RDW 17.8 (*)     Platelet Count 354      % Neutrophils 66      % Lymphocytes 25      % Monocytes 8      % Eosinophils 0      % Basophils 1      % Immature Granulocytes 0      NRBCs per 100 WBC 0      Absolute Neutrophils 3.6      Absolute Lymphocytes 1.4      Absolute Monocytes 0.4      Absolute Eosinophils 0.0      Absolute Basophils 0.0      Absolute Immature Granulocytes 0.0      Absolute NRBCs 0.0     HCG QUALITATIVE PREGNANCY - Normal    hCG Serum Qualitative Negative          Emergency Department Course:     Reviewed:  I reviewed  nursing notes, vitals, past medical history and Care Everywhere    Assessments:  0808 I obtained history and examined the patient as noted above.   1141 I rechecked the patient. Plan for DEC.    Consults:  1438 I spoke with DEC regarding the patient's assessment.     Interventions:  0926 Carvedilol, 25 mg PO  0927 Cozaar, 100 mg PO    Disposition:  The patient was discharged.     Impression & Plan     Medical Decision Making:  This patient presents to the ED for evaluation of intoxication.  She was reportedly dropped off by a friend with whom she was drinking overnight.  Alcohol level is quite elevated which has been observed here for over 8 hours.  She is now clinically sober.  She spoke with the DEC  and is currently denying any suicidal or homicidal ideation.  Social work met with her and is provided her with referrals for housing as she was concerned that she is no longer welcome where she was currently living.    The patient was seen yesterday for epistaxis.  She has not had any bleeding during her observation time here in the ED.  Blood pressure is better controlled today.      Diagnosis:    ICD-10-CM    1. Alcohol abuse  F10.10    2. Anxiety  F41.9        Scribe Disclosure:  I, Jerzy Granda, am serving as a scribe at 8:08 AM on 10/5/2022 to document services personally performed by Rolf Rivas MD based on my observations and the provider's statements to me.   IKrista, am serving as a scribe  at 8:43 AM on 10/5/2022.       Rolf Rivas MD  10/05/22 9781

## 2022-10-05 NOTE — ED NOTES
"Pt continuing to ask why her nose is bleeding, did explain multiple times d/t high blood pressure, alcohol and her picking her nose. Pt then states, \"they told me yesterday too not to pick at my nose for 3 days, but I can't help it.\" Turned on TV for pt, pt wanting writer to call her brother, and ordered meal tray for pt.  "

## 2022-10-05 NOTE — ED NOTES
LATE NOTE     SW was called about patient needing a ride to the [facility]. This is off the bus line. Patient should be given a bus token. Charge RN aware.     [facility] [place], [facility]  Emergency Room   [phone]-Please contact the SW on the floor in which the patient is staying for any questions or concerns

## 2022-10-05 NOTE — ED NOTES
Pt provided w/ paper scrubs per request. Also provided pt w/ hat and gloves for the transition to cooler weather.

## 2022-10-05 NOTE — DISCHARGE INSTRUCTIONS
Shelter Resources   [facility] Shelter Hotline  Phone: [phone] (toll free)  Remarks: Provides information about emergency shelter and   transitional housing for metro-area single adults, families, and youth.    [facility] Crisis Housing- Monday-Friday- 8:00 am-4:30 pm  [phone]    For shelter tonight, start with Adult Shelter Connect Overnight Shelter: [phone]  *10:00AM - 5:30PM Monday-Friday, or 1:00PM - 5:30PM on weekends and holidays.    [place] [address] [place] [address] (enter on the [address] side near the 8th  corner)  Walk-in Hours: 9 am - 5:30 pm Monday-Friday and 1 pm - 5:30 pm Saturday and Sunday    [facility]-[phone]  [address] [place] [address]  *Sign up for emergency shelter between 5-6 pm     [facility]  [phone]  [address] [facility] Shelter  (formerly [facility] [place])  [facility])  Phone: [phone]    [facility]  [phone]  [address] [facility]  [phone]  *Men Only  [address] [facility] Shelter by [place] s  Call or check in by 7:00 pm  [address] [address]  Phone: [phone]    [facility]  [phone]  *Men Only  [address] [facility] [address]- [phone]  *Women only  [address] [facility] [address]      [facility]  [phone]  [address] [place] [address]    To start making a plan for a more long-term solution, contact the Coordinated Entry Homeless Assistance Program:    Coordinated Entry Homeless Assistance  [facility]  [address] [place] [address]  [phone]  Open Wednesdays and Thursdays 8 am-2 pm  The Coordinated Entry System is the [facility]'s approach to organizing and providing housing services for people  experiencing homelessness in [facility].  Because housing resources are limited, this process is designed to ensure that individuals and families with the highest vulnerability, service needs, and length of homelessness receive top priority in housing placement.    Assessment changes due to COVID-19 virus    [facility] family assessors will now be conducting assessments by phone. If you are working with a family staying in a place other than a [facility]-funded shelter and is eligible for Coordinated Entry, continue to contact [facility]  at [phone] to set up an assessment.    For single adults, [facility] will be suspending drop-in hours at [facility]. To have a Coordinated Entry assessment completed, call the [facility]' certified assessors: Harshil at [phone] or Louisa at [phone] directly to set up a time to meet    Call 211 at any time on any day for questions about services and resources available to you.      Family Shelters    [facility] Shelter Team  [phone]  [address] [facility], Floors 5 & 6              Youth, families & disabled adults    Hours: Mon-Fri 8:00 am-4:30 pm.  After-Hours Shelter Team  211      Drop-Ins    [facility]  [phone]  [address] ([facility])              Showers/Laundry (8-11am)              Health Care              Employment Services              Breakfast (7-8 am)              Lunch (11:30am-12:30pm)    Hours: Mon-Sat: Summer 7am-3pm; Winter 7am-4pm.      [facility] [phone]  [address] [place] [address]  Hours: Mon, Wed and Fri 9:00-11:30 am      Clothing    [facility]  [phone]  [address] [place] [address]  Hours: M-Th 10-11:30am & 1:30-3:30pm; Sat/Sun 9:30-10:30 am      Free Meals & Showers    [facility]  [phone]  [address] [place] [address]  Dinner: Mon-Fri 5:30-6:30pm (No showers)    [facility]   [phone]  Meals ([address]): Women & Children M-F 8:30-9am; Everyone: M-F 12-1pm; Sat/Sun, 10:30-11:30 am  Showers ([address]): Mon-Fri 9-10 am    [facility]  [phone]  [address]  Dinner: Thurs - Mon 6 pm  Showers: Daily 8 - 4:30 pm    Other free meal and food shelf finder: https://www.hungersolutions.org    Health Care    [facility] for the Homeless  [phone]  Clinics are in [place] shelters/drop-in centers.  Hours: Mon-Fri at varying sites. Call for sites/times. No insurance or appts required to receive care.  Clinic sites: Adult [facility], [facility], [facility], [facility], [facility], [facility], [facility], [facility], [facility], [facility]       Housing           HousingLink      https://www.Meridian Systemslink.org/       [facility] Housing Resource     Phone: [phone]       [facility] Community Development Agency     Website - https://www.[facility].Caterva/     - Rental Assistance Info: https://www.[place]isterCotendo.org/housing-resources/rental-assistance/     Email: info@[facility].org     Call: [phone]           [facility] Housing Program     https://[facility].Caterva/housing-services/ounklo-rtrivnk-svabczt/     Phone: [phone]            DEC Aftercare Plan  If I am feeling unsafe or I am in a crisis, I will:   Contact my established care providers   Call the National Suicide Prevention Lifeline: 988  Go to the nearest emergency room   Call 911     Warning signs that I or other people might notice when a crisis is developing for me:   - loss of housing   - suicidal thoughts or thoughts of physical self harm  - increased difficulty completing daily tasks    Things I am able to do on my own to cope or help me feel better:   - maintain sobriety  - follow up with      Changes I can make to support my mental health and wellness:   Ways to help cope with sobriety:    -- Take  prescribed medicines as scheduled  -- Keep follow-up appointments  -- Talk to others about your concerns  -- Get regular exercise  -- Practice deep breathing skills  -- Eat a healthy diet  -- Use community resources, including hotline numbers, [facility] crisis and support meetings  -- Stay sober and avoid places/people/things associated with substance use  --Maintain a daily schedule/routine  --Get at least 7-8 hours of sleep per night  --Create a list 10--20 healthy activities that you can do that are enjoyable and do not involve substance use  --Create daily goals (approx. 1-4 goals) per day and work to achieve them throughout the day.     People in my life that I can ask for help:   1- [facility] adult case management; call adult intake number to request case management services  #[phone]     Your [facility] has a mental health crisis team you can call 24/7: [facility]  [phone]        Additional resources and information:   Open Access Connections-   Help in getting a cell phone  They currently have cell phones available. Come see them during walk-in hours Monday, Wednesday, or Friday from 12:00-2:00, or call them at [phone] to schedule an appointment  Address: [address] [place] [address] [address] [place] [address]     Beyond Backgrounds   Beyond Backgrounds makes it more likely that a landlord will relax their screening criteria and rent to you! When a landlord rents to you, they get up to $2,000 of financial assurance if something goes wrong like damage to the unit or legal fees.   https://housinglink.org/beyond-backgrounds     Housinglink.org  Help in finding housing in your price range and ideal location         Crisis Lines  Crisis Text Line  Text 468491  You will be connected with a trained live crisis counselor to provide support.    Por espanol, texto  JAYJAY a 655033 o texto a 442-AYUDAME en WhatsApp    The Robert Project (LGBTQ Youth Crisis Line)  6.503.783.9160  text START to  "984-276      Community Resources  Fast Tracker  Linking people to mental health and substance use disorder resources  UBIKODckSunbayn.org     [place] Mental Health Warm Line  Peer to peer support  Monday thru Saturday, 12 pm to 10 pm  877.010.3024 or 5.949.735.7246  Text \"Support\" to 92277    National [place] on Mental Illness (SUKUMAR)  234.026.0756 or 1.888.SUKUMAR.HELPS      Mental Health Apps  My3  https://Ritot.org/    VirtualHopeBox  https://Voyager [employer]rapeutics/apps/virtual-hope-box/      Additional Information  Today you were seen by a licensed mental health professional through Triage and Transition services, Behavioral Healthcare Providers ([facility])  for a crisis assessment in the Emergency Department at [facility].  It is recommended that you follow up with your established providers (psychiatrist, mental health therapist, and/or primary care doctor - as relevant) as soon as possible. Coordinators from [facility] will be calling you in the next 24-48 hours to ensure that you have the resources you need.  You can also contact [facility] coordinators directly at [phone]. You may have been scheduled for or offered an appointment with a mental health provider. [facility] maintains an extensive network of licensed behavioral health providers to connect patients with the services they need.  We do not charge providers a fee to participate in our referral network.  We match patients with providers based on a patient's specific needs, insurance coverage, and location.  Our first effort will be to refer you to a provider within your care system, and will utilize providers outside your care system as needed.      Substance Use Disorder Direct Access Resources    It is recommended that you abstain from all mood altering chemicals. Please contact the sober support hotline (339-552-9657) as needed; phones are answered 24 hours a day, 7 days a week.    To access substance use treatment you must have a comprehensive assessment " completed to begin any treatment program.     If uninsured, please contact your county of residence for eligibility screen to substance use disorder evaluation and treatment:    [place] - [phone]   [place] - [phone]   [place] - [phone]   [place] - [phone]   [place] - [phone]   [place] - [phone]   [place] - [phone]   [place] - [phone]     If you have private insurance, call the customer service number on the back of your insurance card to find an in-network substance abuse use disorder assessment. The ideal provider will be a treatment facility, licensed in the [facility].     Community SYD Evaluations: Clients may call their county for a full list of providers - Availability and services listed belo are subject to change, please call the provider to confirm    [facility]  [phone]  [address] [place] [address], [address]  *Please call the above number to schedule a comprehensive assessment for determination of level of care needs. In person and virtual appointments available Mon-Fri.    [facility], [address] [place] [address], First Floor, [address], [place] [address] (next to the outpatient lab)    Phone: [phone]   Provides bridging services to people with Opiate Use Disorders (OUD) seeking care. This is a front door to Medication Assisted Treatments (MAT), ages 16+  Walk In hours: Monday-Friday 9:00am-3:00pm    [facility]  [phone]  Walk in Assessments: Mon-Friday 7a-1:45p  [address], [place], [address] [place] [address] Recovery - [facility]  Central Access [phone]  [address] [place] [address], [address]  *by appointment only    [place]  [phone] (phone consultation available )  Locations in: [place], [place], [facility], and [place], [place]  Sinhala virtual IOP [phone] or visit Marina.org/KEM   Also offers LGBTQ programming     [facility]eth[facility]  [facility]  [phone]  [address] [facility]  [place], [place], [address]  *Currently only offered via telehealth - call to set up an appointment    [facility]  [address] [place], [place], [address]  [facility] Recovery Program  For more information to to make a referral call:  [phone]  Walk-in on Fridays  9-11 a.m.    [facility]  [phone]  [address] [place], [place], [address]  *available by appointments only    [facility] specific  [phone]  [address] [place], [place], [address]  *available by appointment only    [facility]  [phone]  [address] [place], [place], [address]  *walk in assessments available M-F starting at 7 am.    [facility]  [phone]  Locations: [facility], [facility], and [place]  *Walk in assessments availble M-F starting at 8 am -virtual only    [facility]  [phone]  [address] [place] [address]    [facility]  Virtual + Locations: [place], [place], [place], [place], [facility], [facility], [place], [place]   [phone]  *available by appointment only    [facility]  [phone]  [address]  [place], [place], [address]    [facility]  [phone]  [address] [place], [place], [address]  *available by appointment    [facility]  [phone]  [address] [place], [place], [address]  *walk ins available M-TH from 9-3    [facility]  MAT program: [phone]  [address] [place], [place], [address]    [place]  [phone]  Same day substance use disorder assessments are available Monday - Friday, via walk-in or by appointment at the [place] location.  [address], [address], [place] [address]     [facility] - adolescent and adult SUDs services  [phone]  Offer services Monday through Friday, as well as evening hours Monday  through Thursday. Normally, a first appointment will be scheduled within one week  [email or web address].Vanu.Eleme Medical/our-services/drug-alcohol-treatment  Locations all over [place]    If you are intoxicated, you may be required to detox at a detox facility before starting treatment. The following are detox facilities that you can self present to. All detox facilities are able to help you complete an assessment prior to discharge if you choose:    [facility]: Arrive at a [facility] for immediate medical evaluation    [facility]: [address] [place], [place], [address].         [phone]    [facility]: [address] [place], [place], [address]  [phone]     [facility] ([place] [facility]): [address] [place], [place], [address] [phone]     [facility]: [address] [place], [place], [address], [phone]                Free Resources:    [facility] Connection ([facility])  [facility] connects people seeking recovery to resources that help foster and sustain long-term recovery. Whether you are seeking resources for treatment, transportation, housing, job training, education, health care or other pathways to recovery, [facility] is a great place to start.  Phone: 371.316.1504. www.minnesotaTextCorner.org (Great listing of all types of recovery and non-recovery related resources)    Alcoholics Anonymous  Phone: 5-347-ALCOHOL  Website: HTTP://WWW.AA.ORG/  AA [place] (872-232-0326 or http://aamin[place].org)  [facility] [place] (188-266-8847 or www.aastpaul.org)     Narcotics Anonymous  Phone: 454.634.2057  Website: www.Semantria.Expan.    [facility] [address], [place], [place],  Phone: [phone]  Drop-in Hours: Monday-Friday 9-11:30 am. By appointment at other times.  Provides: [facility] is a drop-in center on the east side of [place] that provides a safe space for individuals who are homeless and have a history  of chemical use. Sobriety is not a requirement but drugs and alcohol are not allowed on the property.  Services: Non-clients can access drop-in services such as Recovery and Harm Reduction Groups, referrals to case management, community activities, shower facilities, and a pool table. Individuals who are homeless and have chemical health needs may be eligible for enrollment into [facility]'s case management program. Clients and  work together to access benefits, treatment, health care, shelter, and external housing resources.
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
## 2022-10-05 NOTE — CONSULTS
Care Management Follow Up    Length of Stay (days): 0    Additional Information:  See ED note for SW consult.     MOY Villalta, [facility]  Emergency Room   [phone]-Please contact the SW on the floor in which the patient is staying for any questions or concerns
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
## 2022-10-05 NOTE — ED TRIAGE NOTES
Here for concern of nose bleeding 2 days ago. Was seen here yesterday for it, bleeding was stopped and was discharge. Today, patient stated that her nose started gushing out blood again. Currently no active bleeding in triage. Also c/o n/v. Patient cryng in triage, c/o homelessness, wanting to know why she keeps having nose bleed, wants help with , tire of people mistreating her, putting me in mental malhotra. ABCs intact.      Triage Assessment     Row Name 10/05/22 0659       Triage Assessment (Adult)    Airway WDL WDL       Respiratory WDL    Respiratory WDL WDL       Cardiac WDL    Cardiac WDL WDL

## 2022-10-05 NOTE — ED NOTES
Patient sitting on the end of bed with right nare bleeding and peeped in pants on the floor and bed. Cleaned up patient and room along with applying nasal clamp.

## 2022-10-05 NOTE — ED NOTES
Care Management Follow Up    Length of Stay (days): 0    Additional Information:  SW consulted for family / support, discharge planning, and community resources. Patient ETOH is .44 so patient is not appropriate for SW to meet with. Per chart review, patient presents with a MH concern. DEC should be consulted for MH.     SW will place homeless resources in the AVS.     MO[facility] Vill[facility], [facility]  Emergency Room   [phone]-Please contact the SW on the floor in which the patient is staying for any questions or concerns

## 2022-10-05 NOTE — ED NOTES
Pt requesting to go outside and smoke. Informed pt that at this time she is not able to go outside and smoke at this time.

## 2022-11-03 DIAGNOSIS — R60.9 EDEMA, UNSPECIFIED TYPE: ICD-10-CM

## 2022-12-21 ENCOUNTER — APPOINTMENT (OUTPATIENT)
Dept: GENERAL RADIOLOGY | Facility: CLINIC | Age: 52
End: 2022-12-21
Attending: EMERGENCY MEDICINE
Payer: COMMERCIAL

## 2022-12-21 ENCOUNTER — HOSPITAL ENCOUNTER (EMERGENCY)
Facility: CLINIC | Age: 52
Discharge: HOME | End: 2022-12-22
Attending: EMERGENCY MEDICINE | Admitting: EMERGENCY MEDICINE
Payer: COMMERCIAL

## 2022-12-21 DIAGNOSIS — M25.532 LEFT WRIST PAIN: ICD-10-CM

## 2022-12-21 DIAGNOSIS — F10.929 ALCOHOLIC INTOXICATION WITH COMPLICATION (H): ICD-10-CM

## 2022-12-21 DIAGNOSIS — M25.561 RIGHT KNEE PAIN, UNSPECIFIED CHRONICITY: ICD-10-CM

## 2022-12-21 DIAGNOSIS — F32.A DEPRESSION, UNSPECIFIED DEPRESSION TYPE: ICD-10-CM

## 2022-12-21 LAB
ALCOHOL BREATH TEST: 0.37 (ref 0–0.01)
GLUCOSE BLDC GLUCOMTR-MCNC: 83 MG/DL (ref 70–99)

## 2022-12-21 PROCEDURE — 73562 X-RAY EXAM OF KNEE 3: CPT | Mod: RT

## 2022-12-21 PROCEDURE — 250N000013 HC RX MED GY IP 250 OP 250 PS 637: Performed by: EMERGENCY MEDICINE

## 2022-12-21 PROCEDURE — 82075 ASSAY OF BREATH ETHANOL: CPT

## 2022-12-21 PROCEDURE — 73110 X-RAY EXAM OF WRIST: CPT | Mod: LT

## 2022-12-21 PROCEDURE — 99285 EMERGENCY DEPT VISIT HI MDM: CPT | Mod: 25

## 2022-12-21 PROCEDURE — 90791 PSYCH DIAGNOSTIC EVALUATION: CPT

## 2022-12-21 RX ORDER — ACETAMINOPHEN 325 MG/1
650 TABLET ORAL ONCE
Status: COMPLETED | OUTPATIENT
Start: 2022-12-21 | End: 2022-12-21

## 2022-12-21 RX ADMIN — ACETAMINOPHEN 650 MG: 325 TABLET, FILM COATED ORAL at 18:43

## 2022-12-21 ASSESSMENT — ACTIVITIES OF DAILY LIVING (ADL)
ADLS_ACUITY_SCORE: 35

## 2022-12-21 ASSESSMENT — ENCOUNTER SYMPTOMS: ARTHRALGIAS: 1
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
## 2022-12-22 VITALS
OXYGEN SATURATION: 96 % | HEART RATE: 101 BPM | DIASTOLIC BLOOD PRESSURE: 92 MMHG | TEMPERATURE: 98 F | SYSTOLIC BLOOD PRESSURE: 141 MMHG | RESPIRATION RATE: 20 BRPM

## 2022-12-22 PROCEDURE — 90791 PSYCH DIAGNOSTIC EVALUATION: CPT

## 2022-12-22 ASSESSMENT — COLUMBIA-SUICIDE SEVERITY RATING SCALE - C-SSRS
2. HAVE YOU ACTUALLY HAD ANY THOUGHTS OF KILLING YOURSELF?: YES
TOTAL  NUMBER OF INTERRUPTED ATTEMPTS LIFETIME: YES
REASONS FOR IDEATION PAST MONTH: EQUALLY TO GET ATTENTION, REVENGE, OR A REACTION FROM OTHERS AND TO END/STOP THE PAIN
1. HAVE YOU WISHED YOU WERE DEAD OR WISHED YOU COULD GO TO SLEEP AND NOT WAKE UP?: YES
2. HAVE YOU ACTUALLY HAD ANY THOUGHTS OF KILLING YOURSELF?: NO
6. HAVE YOU EVER DONE ANYTHING, STARTED TO DO ANYTHING, OR PREPARED TO DO ANYTHING TO END YOUR LIFE?: NO
TOTAL  NUMBER OF ABORTED OR SELF INTERRUPTED ATTEMPTS LIFETIME: NO
REASONS FOR IDEATION LIFETIME: MOSTLY TO GET ATTENTION, REVENGE, OR A REACTION FROM OTHERS
1. IN THE PAST MONTH, HAVE YOU WISHED YOU WERE DEAD OR WISHED YOU COULD GO TO SLEEP AND NOT WAKE UP?: YES
6. HAVE YOU EVER DONE ANYTHING, STARTED TO DO ANYTHING, OR PREPARED TO DO ANYTHING TO END YOUR LIFE?: YES
4. HAVE YOU HAD THESE THOUGHTS AND HAD SOME INTENTION OF ACTING ON THEM?: NO
5. HAVE YOU STARTED TO WORK OUT OR WORKED OUT THE DETAILS OF HOW TO KILL YOURSELF? DO YOU INTEND TO CARRY OUT THIS PLAN?: YES
TOTAL  NUMBER OF INTERRUPTED ATTEMPTS LIFETIME: 2
4. HAVE YOU HAD THESE THOUGHTS AND HAD SOME INTENTION OF ACTING ON THEM?: YES
3. HAVE YOU BEEN THINKING ABOUT HOW YOU MIGHT KILL YOURSELF?: YES
TOTAL  NUMBER OF INTERRUPTED ATTEMPTS PAST 3 MONTHS: NO
5. HAVE YOU STARTED TO WORK OUT OR WORKED OUT THE DETAILS OF HOW TO KILL YOURSELF? DO YOU INTEND TO CARRY OUT THIS PLAN?: NO
ATTEMPT LIFETIME: NO

## 2022-12-22 ASSESSMENT — ACTIVITIES OF DAILY LIVING (ADL)
ADLS_ACUITY_SCORE: 35
ADLS_ACUITY_SCORE: 35
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
## 2022-12-22 NOTE — DISCHARGE INSTRUCTIONS
Aftercare Plan    Today you were seen by a licensed mental health professional through Triage and Transition services, [facility] ([facility])  for a crisis assessment in the Emergency Department at [facility].  It is recommended that you follow up with your established providers (psychiatrist, mental health therapist, and/or primary care doctor - as relevant) as soon as possible.     Coordinators from [facility] will be calling you in the next 24-48 hours at the number you provided to ensure that you have the resources you need.  You can also contact [facility] coordinators directly at [phone]. You may have been scheduled for or offered an appointment with a mental health provider. [facility] maintains an extensive network of licensed behavioral health providers to connect patients with the services they need.  We do not charge providers a fee to participate in our referral network.  We match patients with providers based on a patient's specific needs, insurance coverage, and location.  Our first effort will be to refer you to a provider within your care system, and will utilize providers outside your care system as needed.      If I am feeling unsafe or I am in a crisis, I will:   Contact my established care providers   Call the National Suicide Prevention Lifeline: 988  Go to the nearest emergency room   Call 911  [facility] Crisis Line Number: [phone]     Warning signs that I or other people might notice when a crisis is developing for me: increasing anxiety, using more alcohol than intended, not asking for what I need,    Things I am able to do on my own to cope or help me feel better: work with [facility] to get a good individual therapist for support and guidance (with  obtaining resources as well_     Things that I am able to do with others to cope or help me better: consider checking out Jose recovery or AA or similar (some groups don't require labeling ourselves or even stopping drinking, Recovery  Connection is great for simple support ([phone] 143-9706  [email or web address]-post/[email or web address]/    Things I can use or do for distraction: music, dancing work pets     Changes I can make to support my mental health and wellness: work with [facility] to get support from a good therapist or counselor      People in my life that I can ask for help: new therapist, new medication manager, trusted friends, coworkers     Your Counts include 234 beds at the [facility] has a mental health crisis team you can call 24/7: [facility] Crisis  [phone]    Other things that are important when I'm in crisis: we agreed I will come back in or call one of my resources before I do something that is not in alignment with my goals, values and aspirations     In case you decide that alcohol has become problematic and you'd like some resources to address the usage, here are some additional resources:    Substance Use Disorder Direct Access Resources    It is recommended that you abstain from all mood altering chemicals. Please contact the sober support hotline (992-524-7412) as needed; phones are answered 24 hours a day, 7 days a week.    To access substance use treatment you must have a comprehensive assessment completed to begin any treatment program.     If uninsured, please contact your county of residence for eligibility screen to substance use disorder evaluation and treatment:    [place] - [phone]   [place] - [phone]   [facility] [phone]   [place] - [phone]   [facility] [phone]   [place] - [phone]   [facility] [phone]   [place] - [phone]     If you have private insurance, call the customer service number on the back of your insurance card to find an in-network substance abuse use disorder assessment. The ideal provider will be a treatment facility, licensed in the state of [place].     Community SYD Evaluations: Clients may call their county for a full list of providers - Availability and services listed  belo are subject to change, please call the provider to confirm    [facility]  [phone]  [address], [place], [address]  *Please call the above number to schedule a comprehensive assessment for determination of level of care needs. In person and virtual appointments available Mon-Fri.    [facility], [address], [address], [address], [place] [address] (next to the outpatient lab)    Phone: [phone]   Provides bridging services to people with Opiate Use Disorders (OUD) seeking care. This is a front door to Medication Assisted Treatments (MAT), ages 16+  Walk In hours: Monday-Friday 9:00am-3:00pm    [facility]  [phone]  Walk in Assessments: Mon-Friday 7a-1:45p  [address], [place], [address]    [facility]  Central Access [phone]  [address] [place], [place], [address]  *by appointment only    [facility]  [phone] (phone consultation available )  Locations in: [place], [place], [facility], and [place], [place]  Luxembourgish virtual IOP [phone] or visit [email or web address]   Also offers LGBTQ programming     [facility]  [phone]  [address]  [place], [place], [address]  *Currently only offered via telehealth - call to set up an appointment    [facility]  [address] [place], [place], [address]  [facility]  For more information to to make a referral call:  [phone]  Walk-in on   9-11 a.m.    [facility]  [phone]  [address] [place], [place], [address]  *available by appointments only    [facility] [place] [facility]  [phone]  [address] [place], [place], [address]  *available by appointment only    [facility]  [phone]  [address], [place], [address]  *walk in assessments available M- starting at 7 am.    [facility]  [phone]  Locations: [place], [facility], [facility], and [place]  *Walk in assessments availble M-F starting at 8 am -virtual only    [facility]  [phone]  [address] [place] [address]    [facility]  Virtual + Locations: [place], [place], [place], [place], [facility], [facility], [place], [place]   [phone]  *available by appointment only    [facility]  [phone]  [address]  [place], [place], [address]    [facility]  [phone]  [address] [place], [place], [address]  *available by appointment    [facility]  [phone]  [address] [place], [place], [address]  *walk ins available M-TH from 9-3    [facility] program: [phone]  [address] [place], [place], [address]    [place]  [phone]  Same day substance use disorder assessments are available Monday - Friday, via walk-in or by appointment at the [place] location.  [address], [place] [address]     [facility] - adolescent and adult SUDs services  [phone]  Offer services Monday through Friday, as well as evening hours Monday through Thursday. Normally, a first appointment will be scheduled within one week  [email or web address]  Locations all over [place]    If you are intoxicated, you may be required to detox at a detox facility before starting treatment. The following are detox facilities that you can self present to. All detox facilities are able to help you complete an assessment prior to discharge if you choose:    [facility]: Arrive at a [facility] for immediate medical evaluation    [facility]: [address] [place], [place], [address].         [phone]    [facility]: [address] [place], [place], [address]  [phone]     [facility] [place]  ([facility]): [address], [place], [address]  [phone]     [facility]: [address], [place], [place], [address], [phone]         Ways to help cope with sobriety:    -- Take prescribed medicines as scheduled  -- Keep follow-up appointments  -- Talk to others about your concerns  -- Get regular exercise  -- Practice deep breathing skills  -- Eat a healthy diet  -- Use community resources, including hotline numbers, [facility] crisis and support meetings  -- Stay sober and avoid places/people/things associated with substance use  --Maintain a daily schedule/routine  --Get at least 7-8 hours of sleep per night  --Create a list 10--20 healthy activities that you can do that are enjoyable and do not involve substance use  --Create daily goals (approx. 1-4 goals) per day and work to achieve them throughout the day.       Free Resources:    [facility] ([facility])  [facility] connects people seeking recovery to resources that help foster and sustain long-term recovery. Whether you are seeking resources for treatment, transportation, housing, job training, education, health care or other pathways to recovery, [facility] is a great place to start.  Phone: 601.745.4709. www.minnesotaCREAT.Quantum Voyage (Great listing of all types of recovery and non-recovery related resources)    Alcoholics Anonymous  Phone: 5-710-ALCOHOL  Website: HTTP://WWW.AA.ORG/  AA [place] (182-045-6835 or http://aaminneapolWorktopia.org)  AA [place] (237-426-1226 or www.aastpaul.org)     Narcotics Anonymous  Phone: 328.482.3654  Website: www.Quench.Code71.    [facility]  [address] [place] [address], [address], [place], [place],  Phone: [phone]  Drop-in Hours: Monday-Friday 9-11:30 am. By appointment at other times.  Provides: [facility] is a drop-in center on the [place] that provides a safe space for individuals who are homeless and have a history of chemical use. Sobriety is not a requirement but  "drugs and alcohol are not allowed on the property.  Services: Non-clients can access drop-in services such as Recovery and Harm Reduction Groups, referrals to case management, community activities, shower facilities, and a pool table. Individuals who are homeless and have chemical health needs may be eligible for enrollment into [facility]'s case management program. Clients and  work together to access benefits, treatment, health care, shelter, and external housing resources.        General Crisis Lines  Crisis Text Line  Text 157643  You will be connected with a trained live crisis counselor to provide support.    Por espanol, texto  JAYJAY a 125146 o texto a 442-AYUDAME en WhatsA      Community Resources  Fast Tracker  Linking people to mental health and substance use disorder resources  [place] Discounters.org     [place] Mental Health Warm Line  Peer to peer support  Monday thru Saturday, 12 pm to 10 pm  301.970.4677 or 4.240.487.2531  Text \"Support\" to 50224    National Stewardson on Mental Illness (SUKUMAR)  562.915.1308 or 1.888.SUKUMAR.HELPS      Mental Health Apps  My3  https://Ampio Pharmaceuticals.org/    VirtualHopeBox  https://Statwing/apps/virtual-hope-box/      Wear wrist brace until hand surgery follow up.    Discharge Instructions  Mental Health Concerns    You were seen today for mental health concerns, such as depression, anxiety, or suicidal thinking. Your provider feels that you do not require hospitalization at this time. However, your symptoms may become worse, and you may need to return to the Emergency Department. Most treatments of depression and suicidal thoughts are a process rather than a single intervention.  Medications and counseling can take several weeks or more to help.    Generally, every Emergency Department visit should have a follow-up clinic visit with either a primary or a specialty clinic/provider. Please follow-up as instructed by your emergency provider " today.    By accepting these discharge instructions:  You promise to not harm yourself or others.  You agree that if you feel you are becoming unable to keep that promise, you will do something to help yourself before you do anything to harm yourself or others.   You agree to keep any safety plan arranged on your visit here today.  You agree to take any medication prescribed or recommended by your provider.  If you are getting worse, you can contact a friend or a family member, contact your counselor or family provider, contact a crisis line, or other options discussed with the provider or therapist today.  At any time, you can call 911 and return to the Emergency Department for more help.  You understand that follow-up is essential to your treatment, and you will make and keep appointments recommended on your visit today.    How to improve your mental health and prevent suicide:  Involve others by letting family, friends, counselors know.  Do not isolate yourself.  Avoid alcohol or drugs. Remove weapons, poisons from your home.  Try to stick to routines for eating, sleeping and getting regular exercise.    Try to get into sunlight. Bright natural light not only treats seasonal affective disorder but also depression.  Increase safe activities that you enjoy.    If you feel worse, contact 2-871-GRASHBM (1-698.332.1642), or call 911, or your primary provider/counselor for additional assistance.    If you were given a prescription for medicine here today, be sure to read all of the information (including the package insert) that comes with your prescription.  This will include important information about the medicine, its side effects, and any warnings that you need to know about.  The pharmacist who fills the prescription can provide more information and answer questions you may have about the medicine.  If you have questions or concerns that the pharmacist cannot address, please call or return to the Emergency  Department.   Remember that you can always come back to the Emergency Department if you are not able to see your regular provider in the amount of time listed above, if you get any new symptoms, or if there is anything that worries you.    Discharge Instructions  Mental Health Concerns    You were seen today for mental health concerns, such as depression, anxiety, or suicidal thinking. Your provider feels that you do not require hospitalization at this time. However, your symptoms may become worse, and you may need to return to the Emergency Department. Most treatments of depression and suicidal thoughts are a process rather than a single intervention.  Medications and counseling can take several weeks or more to help.    Generally, every Emergency Department visit should have a follow-up clinic visit with either a primary or a specialty clinic/provider. Please follow-up as instructed by your emergency provider today.    By accepting these discharge instructions:  You promise to not harm yourself or others.  You agree that if you feel you are becoming unable to keep that promise, you will do something to help yourself before you do anything to harm yourself or others.   You agree to keep any safety plan arranged on your visit here today.  You agree to take any medication prescribed or recommended by your provider.  If you are getting worse, you can contact a friend or a family member, contact your counselor or family provider, contact a crisis line, or other options discussed with the provider or therapist today.  At any time, you can call 911 and return to the Emergency Department for more help.  You understand that follow-up is essential to your treatment, and you will make and keep appointments recommended on your visit today.    How to improve your mental health and prevent suicide:  Involve others by letting family, friends, counselors know.  Do not isolate yourself.  Avoid alcohol or drugs. Remove weapons,  poisons from your home.  Try to stick to routines for eating, sleeping and getting regular exercise.    Try to get into sunlight. Bright natural light not only treats seasonal affective disorder but also depression.  Increase safe activities that you enjoy.    If you feel worse, contact 2-840-PURRCYK (1-295.656.8273), or call 911, or your primary provider/counselor for additional assistance.    If you were given a prescription for medicine here today, be sure to read all of the information (including the package insert) that comes with your prescription.  This will include important information about the medicine, its side effects, and any warnings that you need to know about.  The pharmacist who fills the prescription can provide more information and answer questions you may have about the medicine.  If you have questions or concerns that the pharmacist cannot address, please call or return to the Emergency Department.   Remember that you can always come back to the Emergency Department if you are not able to see your regular provider in the amount of time listed above, if you get any new symptoms, or if there is anything that worries you.  Discharge Instructions  Alcohol Intoxication    You have been seen today with alcohol intoxication. This means that you have enough alcohol in your system to impair your ability to mentally and physically function, perhaps to the extent that you were unable to care for yourself.    Generally, every Emergency Department visit should have a follow-up clinic visit with either a primary or a specialty clinic/provider. Please follow-up as instructed by your emergency provider today.    You may have come to the Emergency Department because of your intoxication, or for another reason, such as because of an injury. No matter what the case is, this visit is a  red flag  regarding alcohol use, and you should consider whether your drinking pattern is a problem for you.     You may be at  risk for alcohol-related problems if:    Men: you drink more than 14 drinks per week, or more than 4 drinks per occasion.    Women: you drink more than 7 drinks per week or more than 3 drinks per occasion.    You have black-outs.  You do things you regret while drinking.  You have legal problems because of drinking.  You have job problems because of drinking (you call in sick to work because of drinking).    CAGE Questions  Have you ever felt you should cut down on your drinking?  Have people annoyed you by criticizing your drinking?  Have you ever felt bad or guilty about your drinking?  Have you ever had a drink first thing in the morning to steady your nerves or get rid of a hangover (eye opener)?    If you answer yes to any of the CAGE questions, you may have a problem with alcohol.      Return to the Emergency Department if:  You become shaky or tremble when you try to stop drinking.   You have severe abdominal pain (belly pain).   You have a seizure or pass out.    You vomit (throw up) blood or have blood in your stool. This may be bright red or it may look like black coffee grounds.  You become lightheaded or faint.      For further help, contact:   Your caregiver.    Alcoholics Anonymous (AA).    [facility] Intergroup: [phone]  [facility] [facility] Central Office: [phone]   A drug or alcohol rehabilitation program.    You can get information on alcohol resources and groups by calling the number 211 or 1-243.908.5115 on any phone.     Seek medical care if:  You have persistent vomiting.   You have persistent pain in any part of your body.    You do not feel better after a few days.    If you were given a prescription for medicine here today, be sure to read all of the information (including the package insert) that comes with your prescription.  This will include important information about the medicine, its side effects, and any warnings that you need to know about.  The pharmacist  who fills the prescription can provide more information and answer questions you may have about the medicine.  If you have questions or concerns that the pharmacist cannot address, please call or return to the Emergency Department.   Remember that you can always come back to the Emergency Department if you are not able to see your regular doctor in the amount of time listed above, if you get any new symptoms, or if there is anything that worries you.

## 2022-12-22 NOTE — CONSULTS
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 understand that follow-up is essential to your treatment, and you will make and keep appointments recommended on your visit today.     How to improve your mental health and prevent suicide:    Involve others by letting family, friends, counselors know.  Do not isolate yourself.    Avoid alcohol or drugs. Remove weapons, poisons from your home.    Try to stick to routines for eating, sleeping and getting regular exercise.      Try to get into sunlight. Bright natural light not only treats seasonal affective disorder but also depression.    Increase safe activities that you enjoy.     If you feel worse, contact [phone] ([phone]), or call 911, or your primary provider/counselor for additional assistance.    If you were given a prescription for medicine here today, be sure to read all of the information (including the package insert) that comes with your prescription.  This will  include important information about the medicine, its side effects, and any warnings that you need to know about.  The pharmacist who fills the prescription can provide more information and answer questions you may have about the medicine.  If you have questions or concerns that the pharmacist cannot address, please call or return to the Emergency Department.   Remember that you can always come back to the Emergency Department if you are not able to see your regular provider in the amount of time listed above, if you get any new symptoms, or if there is anything that worries you.         Last Modified by Roxanne Forde, [facility] at 12/22/22 2955

## 2023-01-29 ENCOUNTER — HEALTH MAINTENANCE LETTER (OUTPATIENT)
Age: 53
End: 2023-01-29

## 2023-02-01 RX ORDER — FUROSEMIDE 20 MG
20 TABLET ORAL DAILY
Qty: 30 TABLET | Refills: 0 | OUTPATIENT
Start: 2023-02-01

## 2023-02-17 ENCOUNTER — APPOINTMENT (OUTPATIENT)
Dept: CT IMAGING | Facility: CLINIC | Age: 53
End: 2023-02-17
Attending: EMERGENCY MEDICINE
Payer: COMMERCIAL

## 2023-02-17 ENCOUNTER — APPOINTMENT (OUTPATIENT)
Dept: GENERAL RADIOLOGY | Facility: CLINIC | Age: 53
End: 2023-02-17
Attending: EMERGENCY MEDICINE
Payer: COMMERCIAL

## 2023-02-17 ENCOUNTER — HOSPITAL ENCOUNTER (INPATIENT)
Facility: CLINIC | Age: 53
LOS: 3 days | Discharge: HOME | End: 2023-02-20
Attending: EMERGENCY MEDICINE | Admitting: INTERNAL MEDICINE
Payer: COMMERCIAL

## 2023-02-17 ENCOUNTER — APPOINTMENT (OUTPATIENT)
Dept: MRI IMAGING | Facility: CLINIC | Age: 53
End: 2023-02-17
Attending: NURSE PRACTITIONER
Payer: COMMERCIAL

## 2023-02-17 ENCOUNTER — TELEPHONE (OUTPATIENT)
Dept: BEHAVIORAL HEALTH | Facility: CLINIC | Age: 53
End: 2023-02-17

## 2023-02-17 DIAGNOSIS — R94.31 PROLONGED Q-T INTERVAL ON ECG: ICD-10-CM

## 2023-02-17 DIAGNOSIS — F10.930 ALCOHOL WITHDRAWAL, UNCOMPLICATED (H): Primary | ICD-10-CM

## 2023-02-17 DIAGNOSIS — R20.0 FACIAL NUMBNESS: ICD-10-CM

## 2023-02-17 DIAGNOSIS — Z59.00 HOMELESSNESS: ICD-10-CM

## 2023-02-17 DIAGNOSIS — M79.622 PAIN OF LEFT UPPER ARM: ICD-10-CM

## 2023-02-17 DIAGNOSIS — R07.9 ACUTE CHEST PAIN: ICD-10-CM

## 2023-02-17 DIAGNOSIS — R29.898 LEFT ARM WEAKNESS: ICD-10-CM

## 2023-02-17 DIAGNOSIS — F41.1 GENERALIZED ANXIETY DISORDER: ICD-10-CM

## 2023-02-17 DIAGNOSIS — I10 ESSENTIAL HYPERTENSION: ICD-10-CM

## 2023-02-17 LAB
ANION GAP SERPL CALCULATED.3IONS-SCNC: 15 MMOL/L (ref 7–15)
ATRIAL RATE - MUSE: 88 BPM
ATRIAL RATE - MUSE: 94 BPM
BASOPHILS # BLD AUTO: 0 10E3/UL (ref 0–0.2)
BASOPHILS NFR BLD AUTO: 0 %
BUN SERPL-MCNC: 8.2 MG/DL (ref 6–20)
CALCIUM SERPL-MCNC: 8.5 MG/DL (ref 8.6–10)
CHLORIDE SERPL-SCNC: 94 MMOL/L (ref 98–107)
CREAT SERPL-MCNC: 0.59 MG/DL (ref 0.51–0.95)
CREAT SERPL-MCNC: 0.6 MG/DL (ref 0.51–0.95)
D DIMER PPP FEU-MCNC: 1.01 UG/ML FEU (ref 0–0.5)
DEPRECATED HCO3 PLAS-SCNC: 31 MMOL/L (ref 22–29)
DIASTOLIC BLOOD PRESSURE - MUSE: NORMAL MMHG
DIASTOLIC BLOOD PRESSURE - MUSE: NORMAL MMHG
EOSINOPHIL # BLD AUTO: 0 10E3/UL (ref 0–0.7)
EOSINOPHIL NFR BLD AUTO: 0 %
ERYTHROCYTE [DISTWIDTH] IN BLOOD BY AUTOMATED COUNT: 18.1 % (ref 10–15)
GFR SERPL CREATININE-BSD FRML MDRD: >90 ML/MIN/1.73M2
GFR SERPL CREATININE-BSD FRML MDRD: >90 ML/MIN/1.73M2
GLUCOSE BLDC GLUCOMTR-MCNC: 133 MG/DL (ref 70–99)
GLUCOSE SERPL-MCNC: 91 MG/DL (ref 70–99)
HBA1C MFR BLD: 4.9 %
HCT VFR BLD AUTO: 30.9 % (ref 35–47)
HGB BLD-MCNC: 11.2 G/DL (ref 11.7–15.7)
HOLD SPECIMEN: NORMAL
IMM GRANULOCYTES # BLD: 0 10E3/UL
IMM GRANULOCYTES NFR BLD: 1 %
INR PPP: 0.96 (ref 0.85–1.15)
INTERPRETATION ECG - MUSE: NORMAL
INTERPRETATION ECG - MUSE: NORMAL
LYMPHOCYTES # BLD AUTO: 0.8 10E3/UL (ref 0.8–5.3)
LYMPHOCYTES NFR BLD AUTO: 14 %
MAGNESIUM SERPL-MCNC: 1 MG/DL (ref 1.7–2.3)
MAGNESIUM SERPL-MCNC: 1.1 MG/DL (ref 1.7–2.3)
MCH RBC QN AUTO: 36.7 PG (ref 26.5–33)
MCHC RBC AUTO-ENTMCNC: 36.2 G/DL (ref 31.5–36.5)
MCV RBC AUTO: 101 FL (ref 78–100)
MONOCYTES # BLD AUTO: 0.7 10E3/UL (ref 0–1.3)
MONOCYTES NFR BLD AUTO: 12 %
NEUTROPHILS # BLD AUTO: 4.2 10E3/UL (ref 1.6–8.3)
NEUTROPHILS NFR BLD AUTO: 73 %
NRBC # BLD AUTO: 0 10E3/UL
NRBC BLD AUTO-RTO: 0 /100
P AXIS - MUSE: 40 DEGREES
P AXIS - MUSE: 54 DEGREES
PHOSPHATE SERPL-MCNC: 3.4 MG/DL (ref 2.5–4.5)
PHOSPHATE SERPL-MCNC: 3.5 MG/DL (ref 2.5–4.5)
PLATELET # BLD AUTO: 171 10E3/UL (ref 150–450)
POTASSIUM SERPL-SCNC: 3.1 MMOL/L (ref 3.4–5.3)
PR INTERVAL - MUSE: 140 MS
PR INTERVAL - MUSE: 154 MS
QRS DURATION - MUSE: 90 MS
QRS DURATION - MUSE: 92 MS
QT - MUSE: 402 MS
QT - MUSE: 412 MS
QTC - MUSE: 498 MS
QTC - MUSE: 502 MS
R AXIS - MUSE: 21 DEGREES
R AXIS - MUSE: 33 DEGREES
RBC # BLD AUTO: 3.05 10E6/UL (ref 3.8–5.2)
SODIUM SERPL-SCNC: 140 MMOL/L (ref 136–145)
SYSTOLIC BLOOD PRESSURE - MUSE: NORMAL MMHG
SYSTOLIC BLOOD PRESSURE - MUSE: NORMAL MMHG
T AXIS - MUSE: 33 DEGREES
T AXIS - MUSE: 5 DEGREES
TROPONIN T SERPL HS-MCNC: 12 NG/L
TROPONIN T SERPL HS-MCNC: 14 NG/L
VENTRICULAR RATE- MUSE: 88 BPM
VENTRICULAR RATE- MUSE: 94 BPM
WBC # BLD AUTO: 5.7 10E3/UL (ref 4–11)

## 2023-02-17 PROCEDURE — 84484 ASSAY OF TROPONIN QUANT: CPT | Performed by: EMERGENCY MEDICINE

## 2023-02-17 PROCEDURE — 36415 COLL VENOUS BLD VENIPUNCTURE: CPT | Performed by: INTERNAL MEDICINE

## 2023-02-17 PROCEDURE — 83735 ASSAY OF MAGNESIUM: CPT | Performed by: INTERNAL MEDICINE

## 2023-02-17 PROCEDURE — 250N000013 HC RX MED GY IP 250 OP 250 PS 637: Performed by: EMERGENCY MEDICINE

## 2023-02-17 PROCEDURE — 82565 ASSAY OF CREATININE: CPT | Performed by: INTERNAL MEDICINE

## 2023-02-17 PROCEDURE — 83735 ASSAY OF MAGNESIUM: CPT | Performed by: EMERGENCY MEDICINE

## 2023-02-17 PROCEDURE — 90791 PSYCH DIAGNOSTIC EVALUATION: CPT

## 2023-02-17 PROCEDURE — 99223 1ST HOSP IP/OBS HIGH 75: CPT | Mod: AI | Performed by: INTERNAL MEDICINE

## 2023-02-17 PROCEDURE — 70450 CT HEAD/BRAIN W/O DYE: CPT

## 2023-02-17 PROCEDURE — 84100 ASSAY OF PHOSPHORUS: CPT | Performed by: INTERNAL MEDICINE

## 2023-02-17 PROCEDURE — 250N000013 HC RX MED GY IP 250 OP 250 PS 637: Performed by: INTERNAL MEDICINE

## 2023-02-17 PROCEDURE — A9585 GADOBUTROL INJECTION: HCPCS | Performed by: EMERGENCY MEDICINE

## 2023-02-17 PROCEDURE — 96361 HYDRATE IV INFUSION ADD-ON: CPT

## 2023-02-17 PROCEDURE — 99207 PR NO CHARGE LOS: CPT | Performed by: NURSE PRACTITIONER

## 2023-02-17 PROCEDURE — 258N000003 HC RX IP 258 OP 636: Performed by: INTERNAL MEDICINE

## 2023-02-17 PROCEDURE — 250N000011 HC RX IP 250 OP 636: Performed by: EMERGENCY MEDICINE

## 2023-02-17 PROCEDURE — 93005 ELECTROCARDIOGRAM TRACING: CPT | Mod: 76

## 2023-02-17 PROCEDURE — 120N000001 HC R&B MED SURG/OB

## 2023-02-17 PROCEDURE — 70496 CT ANGIOGRAPHY HEAD: CPT

## 2023-02-17 PROCEDURE — 93005 ELECTROCARDIOGRAM TRACING: CPT

## 2023-02-17 PROCEDURE — 96374 THER/PROPH/DIAG INJ IV PUSH: CPT | Mod: 59

## 2023-02-17 PROCEDURE — 72125 CT NECK SPINE W/O DYE: CPT

## 2023-02-17 PROCEDURE — 73030 X-RAY EXAM OF SHOULDER: CPT | Mod: LT

## 2023-02-17 PROCEDURE — 258N000003 HC RX IP 258 OP 636: Performed by: EMERGENCY MEDICINE

## 2023-02-17 PROCEDURE — 70553 MRI BRAIN STEM W/O & W/DYE: CPT

## 2023-02-17 PROCEDURE — HZ2ZZZZ DETOXIFICATION SERVICES FOR SUBSTANCE ABUSE TREATMENT: ICD-10-PCS | Performed by: INTERNAL MEDICINE

## 2023-02-17 PROCEDURE — 250N000011 HC RX IP 250 OP 636

## 2023-02-17 PROCEDURE — 85025 COMPLETE CBC W/AUTO DIFF WBC: CPT | Performed by: EMERGENCY MEDICINE

## 2023-02-17 PROCEDURE — 255N000002 HC RX 255 OP 636: Performed by: EMERGENCY MEDICINE

## 2023-02-17 PROCEDURE — 70498 CT ANGIOGRAPHY NECK: CPT

## 2023-02-17 PROCEDURE — 85379 FIBRIN DEGRADATION QUANT: CPT | Performed by: INTERNAL MEDICINE

## 2023-02-17 PROCEDURE — 96372 THER/PROPH/DIAG INJ SC/IM: CPT | Mod: XS

## 2023-02-17 PROCEDURE — 82310 ASSAY OF CALCIUM: CPT | Performed by: EMERGENCY MEDICINE

## 2023-02-17 PROCEDURE — 85610 PROTHROMBIN TIME: CPT | Performed by: EMERGENCY MEDICINE

## 2023-02-17 PROCEDURE — 99285 EMERGENCY DEPT VISIT HI MDM: CPT | Mod: 25

## 2023-02-17 PROCEDURE — 0042T CT HEAD PERFUSION W CONTRAST: CPT

## 2023-02-17 PROCEDURE — 82962 GLUCOSE BLOOD TEST: CPT

## 2023-02-17 PROCEDURE — 84100 ASSAY OF PHOSPHORUS: CPT | Performed by: EMERGENCY MEDICINE

## 2023-02-17 PROCEDURE — 71046 X-RAY EXAM CHEST 2 VIEWS: CPT

## 2023-02-17 PROCEDURE — 36415 COLL VENOUS BLD VENIPUNCTURE: CPT | Performed by: EMERGENCY MEDICINE

## 2023-02-17 PROCEDURE — 83036 HEMOGLOBIN GLYCOSYLATED A1C: CPT | Performed by: INTERNAL MEDICINE

## 2023-02-17 PROCEDURE — 250N000011 HC RX IP 250 OP 636: Performed by: INTERNAL MEDICINE

## 2023-02-17 PROCEDURE — G0378 HOSPITAL OBSERVATION PER HR: HCPCS

## 2023-02-17 RX ORDER — CARVEDILOL 25 MG/1
25 TABLET ORAL 2 TIMES DAILY WITH MEALS
Status: DISCONTINUED | OUTPATIENT
Start: 2023-02-18 | End: 2023-02-20 | Stop reason: HOSPADM

## 2023-02-17 RX ORDER — FOLIC ACID 1 MG/1
1 TABLET ORAL DAILY
Status: DISCONTINUED | OUTPATIENT
Start: 2023-02-17 | End: 2023-02-17

## 2023-02-17 RX ORDER — MULTIPLE VITAMINS W/ MINERALS TAB 9MG-400MCG
1 TAB ORAL DAILY
Status: DISCONTINUED | OUTPATIENT
Start: 2023-02-17 | End: 2023-02-17

## 2023-02-17 RX ORDER — DIAZEPAM 10 MG/2ML
5-10 INJECTION, SOLUTION INTRAMUSCULAR; INTRAVENOUS EVERY 30 MIN PRN
Status: DISCONTINUED | OUTPATIENT
Start: 2023-02-17 | End: 2023-02-19

## 2023-02-17 RX ORDER — ACETAMINOPHEN 325 MG/1
650 TABLET ORAL EVERY 6 HOURS PRN
Status: DISCONTINUED | OUTPATIENT
Start: 2023-02-17 | End: 2023-02-20 | Stop reason: HOSPADM

## 2023-02-17 RX ORDER — FOLIC ACID 1 MG/1
1 TABLET ORAL DAILY
Status: DISCONTINUED | OUTPATIENT
Start: 2023-02-18 | End: 2023-02-20 | Stop reason: HOSPADM

## 2023-02-17 RX ORDER — MULTIVITAMIN,THERAPEUTIC
1 TABLET ORAL DAILY
COMMUNITY

## 2023-02-17 RX ORDER — SODIUM CHLORIDE 9 MG/ML
INJECTION, SOLUTION INTRAVENOUS CONTINUOUS
Status: DISCONTINUED | OUTPATIENT
Start: 2023-02-17 | End: 2023-02-19

## 2023-02-17 RX ORDER — HALOPERIDOL 5 MG/ML
2.5-5 INJECTION INTRAMUSCULAR EVERY 6 HOURS PRN
Status: DISCONTINUED | OUTPATIENT
Start: 2023-02-17 | End: 2023-02-19

## 2023-02-17 RX ORDER — ONDANSETRON 2 MG/ML
4 INJECTION INTRAMUSCULAR; INTRAVENOUS ONCE
Status: COMPLETED | OUTPATIENT
Start: 2023-02-17 | End: 2023-02-17

## 2023-02-17 RX ORDER — AMOXICILLIN 250 MG
1 CAPSULE ORAL 2 TIMES DAILY PRN
Status: DISCONTINUED | OUTPATIENT
Start: 2023-02-17 | End: 2023-02-20 | Stop reason: HOSPADM

## 2023-02-17 RX ORDER — CYANOCOBALAMIN (VITAMIN B-12) 2500 MCG
2500 TABLET, SUBLINGUAL SUBLINGUAL DAILY
COMMUNITY

## 2023-02-17 RX ORDER — IOPAMIDOL 755 MG/ML
125 INJECTION, SOLUTION INTRAVASCULAR ONCE
Status: COMPLETED | OUTPATIENT
Start: 2023-02-17 | End: 2023-02-17

## 2023-02-17 RX ORDER — DIAZEPAM 10 MG
10 TABLET ORAL EVERY 30 MIN PRN
Status: DISCONTINUED | OUTPATIENT
Start: 2023-02-17 | End: 2023-02-19

## 2023-02-17 RX ORDER — LIDOCAINE 4 G/G
1 PATCH TOPICAL ONCE
Status: COMPLETED | OUTPATIENT
Start: 2023-02-17 | End: 2023-02-18

## 2023-02-17 RX ORDER — ACETAMINOPHEN 500 MG
1000 TABLET ORAL ONCE
Status: COMPLETED | OUTPATIENT
Start: 2023-02-17 | End: 2023-02-17

## 2023-02-17 RX ORDER — MULTIPLE VITAMINS W/ MINERALS TAB 9MG-400MCG
1 TAB ORAL DAILY
Status: DISCONTINUED | OUTPATIENT
Start: 2023-02-18 | End: 2023-02-20 | Stop reason: HOSPADM

## 2023-02-17 RX ORDER — DIAZEPAM 10 MG/2ML
5-10 INJECTION, SOLUTION INTRAMUSCULAR; INTRAVENOUS EVERY 30 MIN PRN
Status: DISCONTINUED | OUTPATIENT
Start: 2023-02-17 | End: 2023-02-17

## 2023-02-17 RX ORDER — CYANOCOBALAMIN (VITAMIN B-12) 2500 MCG
2.5 TABLET, SUBLINGUAL SUBLINGUAL DAILY
Status: DISCONTINUED | OUTPATIENT
Start: 2023-02-18 | End: 2023-02-18

## 2023-02-17 RX ORDER — ONDANSETRON 2 MG/ML
INJECTION INTRAMUSCULAR; INTRAVENOUS
Status: COMPLETED
Start: 2023-02-17 | End: 2023-02-17

## 2023-02-17 RX ORDER — BISACODYL 10 MG
10 SUPPOSITORY, RECTAL RECTAL DAILY PRN
Status: DISCONTINUED | OUTPATIENT
Start: 2023-02-17 | End: 2023-02-20 | Stop reason: HOSPADM

## 2023-02-17 RX ORDER — FOLIC ACID 1 MG/1
1 TABLET ORAL DAILY
COMMUNITY

## 2023-02-17 RX ORDER — OLANZAPINE 5 MG/1
5-10 TABLET, ORALLY DISINTEGRATING ORAL EVERY 6 HOURS PRN
Status: DISCONTINUED | OUTPATIENT
Start: 2023-02-17 | End: 2023-02-19

## 2023-02-17 RX ORDER — POTASSIUM CHLORIDE 1.5 G/1.58G
40 POWDER, FOR SOLUTION ORAL ONCE
Status: COMPLETED | OUTPATIENT
Start: 2023-02-17 | End: 2023-02-17

## 2023-02-17 RX ORDER — NICOTINE POLACRILEX 4 MG
15-30 LOZENGE BUCCAL
Status: DISCONTINUED | OUTPATIENT
Start: 2023-02-17 | End: 2023-02-18

## 2023-02-17 RX ORDER — DEXTROSE MONOHYDRATE 25 G/50ML
25-50 INJECTION, SOLUTION INTRAVENOUS
Status: DISCONTINUED | OUTPATIENT
Start: 2023-02-17 | End: 2023-02-18

## 2023-02-17 RX ORDER — AMOXICILLIN 250 MG
2 CAPSULE ORAL 2 TIMES DAILY PRN
Status: DISCONTINUED | OUTPATIENT
Start: 2023-02-17 | End: 2023-02-20 | Stop reason: HOSPADM

## 2023-02-17 RX ORDER — ONDANSETRON 4 MG/1
4 TABLET, ORALLY DISINTEGRATING ORAL ONCE
Status: COMPLETED | OUTPATIENT
Start: 2023-02-17 | End: 2023-02-17

## 2023-02-17 RX ORDER — POTASSIUM CHLORIDE 1500 MG/1
20 TABLET, EXTENDED RELEASE ORAL DAILY
Status: DISCONTINUED | OUTPATIENT
Start: 2023-02-18 | End: 2023-02-18

## 2023-02-17 RX ORDER — DIAZEPAM 5 MG
10 TABLET ORAL EVERY 30 MIN PRN
Status: DISCONTINUED | OUTPATIENT
Start: 2023-02-17 | End: 2023-02-17

## 2023-02-17 RX ORDER — MAGNESIUM OXIDE 400 MG/1
400 TABLET ORAL DAILY
Status: DISCONTINUED | OUTPATIENT
Start: 2023-02-18 | End: 2023-02-20 | Stop reason: HOSPADM

## 2023-02-17 RX ORDER — LIDOCAINE 40 MG/G
CREAM TOPICAL
Status: DISCONTINUED | OUTPATIENT
Start: 2023-02-17 | End: 2023-02-20 | Stop reason: HOSPADM

## 2023-02-17 RX ORDER — ASPIRIN 81 MG/1
81 TABLET ORAL DAILY
Status: DISCONTINUED | OUTPATIENT
Start: 2023-02-18 | End: 2023-02-20 | Stop reason: HOSPADM

## 2023-02-17 RX ORDER — FLUMAZENIL 0.1 MG/ML
0.2 INJECTION, SOLUTION INTRAVENOUS
Status: DISCONTINUED | OUTPATIENT
Start: 2023-02-17 | End: 2023-02-17

## 2023-02-17 RX ORDER — ACETAMINOPHEN 650 MG/1
650 SUPPOSITORY RECTAL EVERY 6 HOURS PRN
Status: DISCONTINUED | OUTPATIENT
Start: 2023-02-17 | End: 2023-02-20 | Stop reason: HOSPADM

## 2023-02-17 RX ORDER — LOSARTAN POTASSIUM 50 MG/1
50 TABLET ORAL ONCE
Status: COMPLETED | OUTPATIENT
Start: 2023-02-17 | End: 2023-02-17

## 2023-02-17 RX ORDER — GADOBUTROL 604.72 MG/ML
10 INJECTION INTRAVENOUS ONCE
Status: COMPLETED | OUTPATIENT
Start: 2023-02-17 | End: 2023-02-17

## 2023-02-17 RX ORDER — ENOXAPARIN SODIUM 100 MG/ML
40 INJECTION SUBCUTANEOUS EVERY 12 HOURS
Status: DISCONTINUED | OUTPATIENT
Start: 2023-02-17 | End: 2023-02-18

## 2023-02-17 RX ORDER — FLUMAZENIL 0.1 MG/ML
0.2 INJECTION, SOLUTION INTRAVENOUS
Status: DISCONTINUED | OUTPATIENT
Start: 2023-02-17 | End: 2023-02-19

## 2023-02-17 RX ORDER — CLONIDINE HYDROCHLORIDE 0.1 MG/1
0.1 TABLET ORAL EVERY 8 HOURS
Status: DISCONTINUED | OUTPATIENT
Start: 2023-02-17 | End: 2023-02-18

## 2023-02-17 RX ORDER — ONDANSETRON 4 MG/1
4 TABLET, ORALLY DISINTEGRATING ORAL EVERY 6 HOURS PRN
Status: DISCONTINUED | OUTPATIENT
Start: 2023-02-17 | End: 2023-02-20 | Stop reason: HOSPADM

## 2023-02-17 RX ORDER — ONDANSETRON 2 MG/ML
4 INJECTION INTRAMUSCULAR; INTRAVENOUS EVERY 6 HOURS PRN
Status: DISCONTINUED | OUTPATIENT
Start: 2023-02-17 | End: 2023-02-20 | Stop reason: HOSPADM

## 2023-02-17 RX ORDER — HYDRALAZINE HYDROCHLORIDE 20 MG/ML
10 INJECTION INTRAMUSCULAR; INTRAVENOUS EVERY 4 HOURS PRN
Status: DISCONTINUED | OUTPATIENT
Start: 2023-02-17 | End: 2023-02-20 | Stop reason: HOSPADM

## 2023-02-17 RX ORDER — LOSARTAN POTASSIUM 100 MG/1
100 TABLET ORAL DAILY
Status: DISCONTINUED | OUTPATIENT
Start: 2023-02-18 | End: 2023-02-20 | Stop reason: HOSPADM

## 2023-02-17 RX ADMIN — SODIUM CHLORIDE, PRESERVATIVE FREE: 5 INJECTION INTRAVENOUS at 22:22

## 2023-02-17 RX ADMIN — IOPAMIDOL 125 ML: 755 INJECTION, SOLUTION INTRAVENOUS at 09:57

## 2023-02-17 RX ADMIN — DIAZEPAM 10 MG: 5 TABLET ORAL at 20:32

## 2023-02-17 RX ADMIN — POTASSIUM CHLORIDE 40 MEQ: 1.5 POWDER, FOR SOLUTION ORAL at 13:40

## 2023-02-17 RX ADMIN — LIDOCAINE PATCH 4% 1 PATCH: 40 PATCH TOPICAL at 15:30

## 2023-02-17 RX ADMIN — GADOBUTROL 10 ML: 604.72 INJECTION INTRAVENOUS at 13:02

## 2023-02-17 RX ADMIN — ONDANSETRON 4 MG: 2 INJECTION INTRAMUSCULAR; INTRAVENOUS at 10:41

## 2023-02-17 RX ADMIN — SODIUM CHLORIDE 500 ML: 9 INJECTION, SOLUTION INTRAVENOUS at 14:45

## 2023-02-17 RX ADMIN — FOLIC ACID 1 MG: 1 TABLET ORAL at 16:43

## 2023-02-17 RX ADMIN — ONDANSETRON 4 MG: 4 TABLET, ORALLY DISINTEGRATING ORAL at 16:43

## 2023-02-17 RX ADMIN — MULTIPLE VITAMINS W/ MINERALS TAB 1 TABLET: TAB at 16:43

## 2023-02-17 RX ADMIN — ENOXAPARIN SODIUM 40 MG: 40 INJECTION SUBCUTANEOUS at 22:25

## 2023-02-17 RX ADMIN — CLONIDINE HYDROCHLORIDE 0.1 MG: 0.1 TABLET ORAL at 22:27

## 2023-02-17 RX ADMIN — DIAZEPAM 10 MG: 5 TABLET ORAL at 16:49

## 2023-02-17 RX ADMIN — LOSARTAN POTASSIUM 50 MG: 50 TABLET, FILM COATED ORAL at 11:56

## 2023-02-17 RX ADMIN — ACETAMINOPHEN 1000 MG: 500 TABLET ORAL at 10:32

## 2023-02-17 RX ADMIN — THIAMINE HCL TAB 100 MG 100 MG: 100 TAB at 16:43

## 2023-02-17 SDOH — ECONOMIC STABILITY - HOUSING INSECURITY: HOMELESSNESS UNSPECIFIED: Z59.00

## 2023-02-17 ASSESSMENT — COLUMBIA-SUICIDE SEVERITY RATING SCALE - C-SSRS
TOTAL  NUMBER OF ACTUAL ATTEMPTS LIFETIME: 1
1. IN THE PAST MONTH, HAVE YOU WISHED YOU WERE DEAD OR WISHED YOU COULD GO TO SLEEP AND NOT WAKE UP?: YES
ATTEMPT LIFETIME: YES
5. HAVE YOU STARTED TO WORK OUT OR WORKED OUT THE DETAILS OF HOW TO KILL YOURSELF? DO YOU INTEND TO CARRY OUT THIS PLAN?: YES
2. HAVE YOU ACTUALLY HAD ANY THOUGHTS OF KILLING YOURSELF?: YES
1. HAVE YOU WISHED YOU WERE DEAD OR WISHED YOU COULD GO TO SLEEP AND NOT WAKE UP?: YES
2. HAVE YOU ACTUALLY HAD ANY THOUGHTS OF KILLING YOURSELF?: YES
5. HAVE YOU STARTED TO WORK OUT OR WORKED OUT THE DETAILS OF HOW TO KILL YOURSELF? DO YOU INTEND TO CARRY OUT THIS PLAN?: NO
ATTEMPT PAST THREE MONTHS: NO

## 2023-02-17 ASSESSMENT — ACTIVITIES OF DAILY LIVING (ADL)
ADLS_ACUITY_SCORE: 35

## 2023-02-17 ASSESSMENT — ENCOUNTER SYMPTOMS
MYALGIAS: 0
NUMBNESS: 1
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
## 2023-02-17 NOTE — CONSULTS
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 shelters/drop-in centers.  Hours: Mon-Fri at varying sites. Call for sites/times. No insurance or appts required to receive care.  Clinic sites: Adult [facility], [facility], [facility], [facility], [facility], [facility], [facility], [facility], [facility], [facility]         Crisis Lines  Crisis Text Line  Text 494138  You will be connected with a trained live crisis counselor to  "provide support.     Por espanol, texto  JAYJAY a 733879 o texto a 442-AYUDAME en WhatsApp     [facility] (LGBTQ Youth Crisis Line)  2.495.617.2198  text START to 461-204        Community Resources  Fast Tracker  Linking people to mental health and substance use disorder resources  RichRelevancen.GeekChicDaily      [place] [facility] Warm Line  Peer to peer support  Monday thru Saturday, 12 pm to 10 pm  328.239.3584 or 0.388.659.9275  Text \"Support\" to 90502     National [place] on Mental Illness (SUKUMAR)  234.355.6933 or 1.888.SUKUMAR.HELPS        Mental Health Apps  My3  https://NVoicePay.org/     VirtualHopeBox  https://Vendobots/apps/virtual-hope-box/        Additional Information  Today you were seen by a licensed mental health professional through Triage and Transition services, [facility] Providers ([facility])  for a crisis assessment in the Emergency Department at [facility].  It is recommended that you follow up with your established providers (psychiatrist, mental health therapist, and/or primary care doctor - as relevant) as soon as possible. Coordinators from [facility] will be calling you in the next 24-48 hours to ensure that you have the resources you need.  You can also contact [facility] coordinators directly at [phone]. You may have been scheduled for or offered an appointment with a mental health provider. [facility] maintains an extensive network of licensed behavioral health providers to connect patients with the services they need.  We do not charge providers a fee to participate in our referral network.  We match patients with providers based on a patient's specific needs, insurance coverage, and location.  Our first effort will be to refer you to a provider within your care system, and will utilize providers outside your care system as needed.             "

## 2023-02-18 ENCOUNTER — APPOINTMENT (OUTPATIENT)
Dept: GENERAL RADIOLOGY | Facility: CLINIC | Age: 53
End: 2023-02-18
Attending: INTERNAL MEDICINE
Payer: COMMERCIAL

## 2023-02-18 LAB
ALBUMIN SERPL BCG-MCNC: 3.6 G/DL (ref 3.5–5.2)
ALP SERPL-CCNC: 69 U/L (ref 35–104)
ALT SERPL W P-5'-P-CCNC: 24 U/L (ref 10–35)
ANION GAP SERPL CALCULATED.3IONS-SCNC: 11 MMOL/L (ref 7–15)
AST SERPL W P-5'-P-CCNC: 62 U/L (ref 10–35)
BILIRUB SERPL-MCNC: 0.7 MG/DL
BUN SERPL-MCNC: 6.3 MG/DL (ref 6–20)
CALCIUM SERPL-MCNC: 8 MG/DL (ref 8.6–10)
CHLORIDE SERPL-SCNC: 96 MMOL/L (ref 98–107)
CREAT SERPL-MCNC: 0.63 MG/DL (ref 0.51–0.95)
DEPRECATED HCO3 PLAS-SCNC: 31 MMOL/L (ref 22–29)
ERYTHROCYTE [DISTWIDTH] IN BLOOD BY AUTOMATED COUNT: 18 % (ref 10–15)
GFR SERPL CREATININE-BSD FRML MDRD: >90 ML/MIN/1.73M2
GLUCOSE BLDC GLUCOMTR-MCNC: 112 MG/DL (ref 70–99)
GLUCOSE BLDC GLUCOMTR-MCNC: 120 MG/DL (ref 70–99)
GLUCOSE SERPL-MCNC: 96 MG/DL (ref 70–99)
HCT VFR BLD AUTO: 28 % (ref 35–47)
HGB BLD-MCNC: 10.2 G/DL (ref 11.7–15.7)
MAGNESIUM SERPL-MCNC: 1.1 MG/DL (ref 1.7–2.3)
MAGNESIUM SERPL-MCNC: 2.5 MG/DL (ref 1.7–2.3)
MCH RBC QN AUTO: 37.2 PG (ref 26.5–33)
MCHC RBC AUTO-ENTMCNC: 36.4 G/DL (ref 31.5–36.5)
MCV RBC AUTO: 102 FL (ref 78–100)
PHOSPHATE SERPL-MCNC: 2.6 MG/DL (ref 2.5–4.5)
PLATELET # BLD AUTO: 143 10E3/UL (ref 150–450)
POTASSIUM SERPL-SCNC: 2.9 MMOL/L (ref 3.4–5.3)
POTASSIUM SERPL-SCNC: 3.4 MMOL/L (ref 3.4–5.3)
PROT SERPL-MCNC: 6.4 G/DL (ref 6.4–8.3)
RBC # BLD AUTO: 2.74 10E6/UL (ref 3.8–5.2)
SODIUM SERPL-SCNC: 138 MMOL/L (ref 136–145)
WBC # BLD AUTO: 3.3 10E3/UL (ref 4–11)

## 2023-02-18 PROCEDURE — 250N000013 HC RX MED GY IP 250 OP 250 PS 637: Performed by: EMERGENCY MEDICINE

## 2023-02-18 PROCEDURE — 80053 COMPREHEN METABOLIC PANEL: CPT | Performed by: INTERNAL MEDICINE

## 2023-02-18 PROCEDURE — G0378 HOSPITAL OBSERVATION PER HR: HCPCS

## 2023-02-18 PROCEDURE — 82962 GLUCOSE BLOOD TEST: CPT

## 2023-02-18 PROCEDURE — 96361 HYDRATE IV INFUSION ADD-ON: CPT

## 2023-02-18 PROCEDURE — 85014 HEMATOCRIT: CPT | Performed by: INTERNAL MEDICINE

## 2023-02-18 PROCEDURE — 250N000011 HC RX IP 250 OP 636: Performed by: INTERNAL MEDICINE

## 2023-02-18 PROCEDURE — 73110 X-RAY EXAM OF WRIST: CPT | Mod: LT

## 2023-02-18 PROCEDURE — 258N000003 HC RX IP 258 OP 636: Performed by: INTERNAL MEDICINE

## 2023-02-18 PROCEDURE — 99232 SBSQ HOSP IP/OBS MODERATE 35: CPT | Performed by: INTERNAL MEDICINE

## 2023-02-18 PROCEDURE — 83735 ASSAY OF MAGNESIUM: CPT | Performed by: INTERNAL MEDICINE

## 2023-02-18 PROCEDURE — 96375 TX/PRO/DX INJ NEW DRUG ADDON: CPT

## 2023-02-18 PROCEDURE — 84100 ASSAY OF PHOSPHORUS: CPT | Performed by: INTERNAL MEDICINE

## 2023-02-18 PROCEDURE — 250N000013 HC RX MED GY IP 250 OP 250 PS 637: Performed by: INTERNAL MEDICINE

## 2023-02-18 PROCEDURE — 96372 THER/PROPH/DIAG INJ SC/IM: CPT

## 2023-02-18 PROCEDURE — 84132 ASSAY OF SERUM POTASSIUM: CPT | Mod: 91 | Performed by: INTERNAL MEDICINE

## 2023-02-18 PROCEDURE — 36415 COLL VENOUS BLD VENIPUNCTURE: CPT | Performed by: INTERNAL MEDICINE

## 2023-02-18 PROCEDURE — 120N000001 HC R&B MED SURG/OB

## 2023-02-18 PROCEDURE — 85045 AUTOMATED RETICULOCYTE COUNT: CPT | Performed by: INTERNAL MEDICINE

## 2023-02-18 RX ORDER — POTASSIUM CHLORIDE 1500 MG/1
20 TABLET, EXTENDED RELEASE ORAL DAILY
Status: DISCONTINUED | OUTPATIENT
Start: 2023-02-18 | End: 2023-02-20 | Stop reason: HOSPADM

## 2023-02-18 RX ORDER — MAGNESIUM SULFATE HEPTAHYDRATE 40 MG/ML
4 INJECTION, SOLUTION INTRAVENOUS EVERY 4 HOURS
Status: DISCONTINUED | OUTPATIENT
Start: 2023-02-18 | End: 2023-02-18

## 2023-02-18 RX ORDER — NALOXONE HYDROCHLORIDE 0.4 MG/ML
0.4 INJECTION, SOLUTION INTRAMUSCULAR; INTRAVENOUS; SUBCUTANEOUS
Status: DISCONTINUED | OUTPATIENT
Start: 2023-02-18 | End: 2023-02-20 | Stop reason: HOSPADM

## 2023-02-18 RX ORDER — NALOXONE HYDROCHLORIDE 0.4 MG/ML
0.2 INJECTION, SOLUTION INTRAMUSCULAR; INTRAVENOUS; SUBCUTANEOUS
Status: DISCONTINUED | OUTPATIENT
Start: 2023-02-18 | End: 2023-02-20 | Stop reason: HOSPADM

## 2023-02-18 RX ORDER — AMLODIPINE BESYLATE 5 MG/1
5 TABLET ORAL DAILY
Status: DISCONTINUED | OUTPATIENT
Start: 2023-02-18 | End: 2023-02-19

## 2023-02-18 RX ORDER — POTASSIUM CHLORIDE 1500 MG/1
20 TABLET, EXTENDED RELEASE ORAL ONCE
Status: COMPLETED | OUTPATIENT
Start: 2023-02-18 | End: 2023-02-18

## 2023-02-18 RX ORDER — MAGNESIUM SULFATE HEPTAHYDRATE 40 MG/ML
4 INJECTION, SOLUTION INTRAVENOUS ONCE
Status: COMPLETED | OUTPATIENT
Start: 2023-02-18 | End: 2023-02-18

## 2023-02-18 RX ORDER — POTASSIUM CHLORIDE 1500 MG/1
40 TABLET, EXTENDED RELEASE ORAL ONCE
Status: COMPLETED | OUTPATIENT
Start: 2023-02-18 | End: 2023-02-18

## 2023-02-18 RX ORDER — ESCITALOPRAM OXALATE 5 MG/1
5 TABLET ORAL DAILY
Status: DISCONTINUED | OUTPATIENT
Start: 2023-02-18 | End: 2023-02-20 | Stop reason: HOSPADM

## 2023-02-18 RX ORDER — ENOXAPARIN SODIUM 100 MG/ML
40 INJECTION SUBCUTANEOUS EVERY 24 HOURS
Status: DISCONTINUED | OUTPATIENT
Start: 2023-02-19 | End: 2023-02-19

## 2023-02-18 RX ADMIN — LOSARTAN POTASSIUM 100 MG: 100 TABLET, FILM COATED ORAL at 08:08

## 2023-02-18 RX ADMIN — MULTIPLE VITAMINS W/ MINERALS TAB 1 TABLET: TAB at 08:08

## 2023-02-18 RX ADMIN — SODIUM CHLORIDE, PRESERVATIVE FREE: 5 INJECTION INTRAVENOUS at 21:09

## 2023-02-18 RX ADMIN — CYANOCOBALAMIN TAB 1000 MCG 2500 MCG: 1000 TAB at 08:08

## 2023-02-18 RX ADMIN — ASPIRIN 81 MG: 81 TABLET, COATED ORAL at 08:08

## 2023-02-18 RX ADMIN — POTASSIUM CHLORIDE 40 MEQ: 1500 TABLET, EXTENDED RELEASE ORAL at 20:28

## 2023-02-18 RX ADMIN — MAGNESIUM SULFATE HEPTAHYDRATE 4 G: 40 INJECTION, SOLUTION INTRAVENOUS at 10:08

## 2023-02-18 RX ADMIN — ESCITALOPRAM 5 MG: 5 TABLET, FILM COATED ORAL at 18:44

## 2023-02-18 RX ADMIN — THIAMINE HCL TAB 100 MG 100 MG: 100 TAB at 08:08

## 2023-02-18 RX ADMIN — SODIUM CHLORIDE, PRESERVATIVE FREE: 5 INJECTION INTRAVENOUS at 08:06

## 2023-02-18 RX ADMIN — FOLIC ACID 1 MG: 1 TABLET ORAL at 08:08

## 2023-02-18 RX ADMIN — POTASSIUM CHLORIDE 40 MEQ: 1500 TABLET, EXTENDED RELEASE ORAL at 10:07

## 2023-02-18 RX ADMIN — CLONIDINE HYDROCHLORIDE 0.1 MG: 0.1 TABLET ORAL at 14:25

## 2023-02-18 RX ADMIN — MAGNESIUM OXIDE 400 MG (241.3 MG MAGNESIUM) TABLET 400 MG: at 08:08

## 2023-02-18 RX ADMIN — CARVEDILOL 25 MG: 25 TABLET, FILM COATED ORAL at 08:08

## 2023-02-18 RX ADMIN — AMLODIPINE BESYLATE 5 MG: 5 TABLET ORAL at 17:56

## 2023-02-18 RX ADMIN — POTASSIUM CHLORIDE 20 MEQ: 1500 TABLET, EXTENDED RELEASE ORAL at 12:01

## 2023-02-18 RX ADMIN — ENOXAPARIN SODIUM 40 MG: 40 INJECTION SUBCUTANEOUS at 12:02

## 2023-02-18 RX ADMIN — POTASSIUM CHLORIDE 20 MEQ: 1500 TABLET, EXTENDED RELEASE ORAL at 08:08

## 2023-02-18 RX ADMIN — CLONIDINE HYDROCHLORIDE 0.1 MG: 0.1 TABLET ORAL at 06:18

## 2023-02-18 RX ADMIN — CARVEDILOL 25 MG: 25 TABLET, FILM COATED ORAL at 17:56

## 2023-02-18 ASSESSMENT — ACTIVITIES OF DAILY LIVING (ADL)
ADLS_ACUITY_SCORE: 31
WEAR_GLASSES_OR_BLIND: YES
ADLS_ACUITY_SCORE: 31
ADLS_ACUITY_SCORE: 35
ADLS_ACUITY_SCORE: 31
ADLS_ACUITY_SCORE: 35
DEPENDENT_IADLS:: INDEPENDENT
ADLS_ACUITY_SCORE: 31
ADLS_ACUITY_SCORE: 35

## 2023-02-18 NOTE — PROGRESS NOTES
Pt's /123 at 0908. Notified MD as patient does not have PRN meds.     MD did come see her and orders placed. No PRN meds needed. Blood sugar checks done. Patient A&Ox4. Up independently to the commode. CIWA scores have been lower than 8 since midnight. Patient sleeping intermittently. On tele. NSR.    Had 203 SBP this morning. Gave her her scheduled clonidine.     BP (!) 145/96   Pulse 77   Temp 98.7  F (37.1  C) (Oral)   Resp 12   Wt 104.3 kg (230 lb)   SpO2 97%   BMI 42.07 kg/m

## 2023-02-18 NOTE — PHARMACY-ADMISSION MEDICATION HISTORY
Admission medication history interview status for this patient is complete. See [facility] admission navigator for allergy information, prior to admission medications and immunization status.     Medication history interview done, indicate source(s): Patient  Medication history resources (including written lists, pill bottles, clinic record):None  Pharmacy: -    Changes made to PTA medication list:  Added: folic acid, mvi, B12  Changed: -  Reported as Not Taking: xarelto  Removed: -    Actions taken by pharmacist (provider contacted, etc):None     Additional medication history information: Xarelto - reports stopped taking by self in June 2022. Says stopped taking because the clots resolved.    Medication reconciliation/reorder completed by provider prior to medication history?  no   (Y/N)     Medication Affordability: no        For patients on insulin therapy:   Do you use sliding scale insulin based on blood sugars?   Do you typically eat three meals a day?   How many times do you check your blood glucose per day?   How many episodes of hypoglycemia do you typically have per month? none  Do you have a Continuous Glucose Monitor (CGM)?      Prior to Admission medications    Medication Sig Last Dose Taking? Auth Provider Long Term End Date   acetaminophen (TYLENOL) 500 MG tablet Take 1-2 tablets (500-1,000 mg) by mouth every 6 hours as needed for mild pain  Yes Elizabeth Ann PA-C     carvedilol (COREG) 25 MG tablet Take 1 tablet (25 mg) by mouth 2 times daily (with meals) Past Week Yes Leatha Edward PA-C Yes    folic acid (FOLVITE) 1 MG tablet Take 1 mg by mouth daily Past Week Yes Unknown, Entered By History     losartan (COZAAR) 100 MG tablet Take 1 tablet (100 mg) by mouth daily Past Week Yes Leatha Edward PA-C Yes    magnesium oxide (MAG-OX) 400 MG tablet Take 1 tablet (400 mg) by mouth daily Past Week Yes Austin Quiros MD     multivitamin, therapeutic (THERA-VIT) TABS tablet Take 1 tablet by  mouth daily Past Week Yes Unknown, Entered By History     potassium chloride ER (K-TAB) 20 MEQ CR tablet Take 1 tablet (20 mEq) by mouth daily Past Week Yes Arlene Welsh APRN CNP     vitamin B-12 (CYANOCOBALAMIN) 2500 MCG sublingual tablet Take 2,500 mcg by mouth daily Past Week Yes Unknown, Entered By History     methocarbamol (ROBAXIN) 500 MG tablet Take 1 tablet (500 mg) by mouth 3 times daily as needed for other (leg pain) If tylenol is ineffective  Patient not taking: Reported on 9/1/2022   Leatha Edward PA-C     rivaroxaban ANTICOAGULANT (XARELTO) 20 MG TABS tablet Take 1 tablet (20 mg) by mouth daily (with dinner)  Patient not taking: Reported on 2/17/2023 Not Taking at last took June 2022  Arlene Welsh APRN CNP Yes
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
## 2023-02-18 NOTE — PLAN OF CARE
Goal Outcome Evaluation: Progressing    POC reviewed with: Patient    BP (!) 168/103 (BP Location: Right arm)   Pulse 71   Temp 98.1  F (36.7  C) (Oral)   Resp 16   Wt 104.3 kg (230 lb)   SpO2 97%   BMI 42.07 kg/m      On RA.    Pertinent Assessments: A/Ox4. CIWA scores 2, 3. LS clear. Continued c/o 5/10 intermittent midsternal CP. Numbness/tingling reported to BLE. Pt reports occasional loose stools and urinary urgency/frequency.   Major Shift Events: K replaced PO. Mag replaced IV.   Treatment Plan: IVF. CIWA protocol. Hypertension mgmt. SW following. Electrolyte monitoring and replacement.
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
## 2023-02-19 ENCOUNTER — APPOINTMENT (OUTPATIENT)
Dept: CARDIOLOGY | Facility: CLINIC | Age: 53
End: 2023-02-19
Attending: INTERNAL MEDICINE
Payer: COMMERCIAL

## 2023-02-19 LAB
ANION GAP SERPL CALCULATED.3IONS-SCNC: 8 MMOL/L (ref 7–15)
BUN SERPL-MCNC: 7.6 MG/DL (ref 6–20)
CALCIUM SERPL-MCNC: 7.9 MG/DL (ref 8.6–10)
CHLORIDE SERPL-SCNC: 100 MMOL/L (ref 98–107)
CREAT SERPL-MCNC: 0.6 MG/DL (ref 0.51–0.95)
DEPRECATED HCO3 PLAS-SCNC: 28 MMOL/L (ref 22–29)
FERRITIN SERPL-MCNC: 152 NG/ML (ref 11–328)
GFR SERPL CREATININE-BSD FRML MDRD: >90 ML/MIN/1.73M2
GLUCOSE SERPL-MCNC: 109 MG/DL (ref 70–99)
IRON BINDING CAPACITY (ROCHE): 202 UG/DL (ref 240–430)
IRON SATN MFR SERPL: 13 % (ref 15–46)
IRON SERPL-MCNC: 26 UG/DL (ref 37–145)
LVEF ECHO: NORMAL
PHOSPHATE SERPL-MCNC: 2.1 MG/DL (ref 2.5–4.5)
POTASSIUM SERPL-SCNC: 3.8 MMOL/L (ref 3.4–5.3)
POTASSIUM SERPL-SCNC: 3.9 MMOL/L (ref 3.4–5.3)
RETICS # AUTO: 0.04 10E6/UL (ref 0.03–0.1)
RETICS/RBC NFR AUTO: 1.4 % (ref 0.5–2)
SODIUM SERPL-SCNC: 136 MMOL/L (ref 136–145)
VIT B12 SERPL-MCNC: 841 PG/ML (ref 232–1245)

## 2023-02-19 PROCEDURE — 82728 ASSAY OF FERRITIN: CPT | Performed by: INTERNAL MEDICINE

## 2023-02-19 PROCEDURE — 93321 DOPPLER ECHO F-UP/LMTD STD: CPT

## 2023-02-19 PROCEDURE — 258N000003 HC RX IP 258 OP 636: Performed by: INTERNAL MEDICINE

## 2023-02-19 PROCEDURE — 93321 DOPPLER ECHO F-UP/LMTD STD: CPT | Mod: 26 | Performed by: INTERNAL MEDICINE

## 2023-02-19 PROCEDURE — 36415 COLL VENOUS BLD VENIPUNCTURE: CPT | Performed by: INTERNAL MEDICINE

## 2023-02-19 PROCEDURE — 83550 IRON BINDING TEST: CPT | Performed by: INTERNAL MEDICINE

## 2023-02-19 PROCEDURE — 250N000011 HC RX IP 250 OP 636: Performed by: INTERNAL MEDICINE

## 2023-02-19 PROCEDURE — 250N000013 HC RX MED GY IP 250 OP 250 PS 637: Performed by: INTERNAL MEDICINE

## 2023-02-19 PROCEDURE — 93325 DOPPLER ECHO COLOR FLOW MAPG: CPT

## 2023-02-19 PROCEDURE — G0378 HOSPITAL OBSERVATION PER HR: HCPCS

## 2023-02-19 PROCEDURE — 84132 ASSAY OF SERUM POTASSIUM: CPT | Mod: 91 | Performed by: INTERNAL MEDICINE

## 2023-02-19 PROCEDURE — 120N000001 HC R&B MED SURG/OB

## 2023-02-19 PROCEDURE — 82607 VITAMIN B-12: CPT | Performed by: INTERNAL MEDICINE

## 2023-02-19 PROCEDURE — 93325 DOPPLER ECHO COLOR FLOW MAPG: CPT | Mod: 26 | Performed by: INTERNAL MEDICINE

## 2023-02-19 PROCEDURE — 93308 TTE F-UP OR LMTD: CPT | Mod: 26 | Performed by: INTERNAL MEDICINE

## 2023-02-19 PROCEDURE — 84132 ASSAY OF SERUM POTASSIUM: CPT | Performed by: INTERNAL MEDICINE

## 2023-02-19 PROCEDURE — 82306 VITAMIN D 25 HYDROXY: CPT | Performed by: INTERNAL MEDICINE

## 2023-02-19 PROCEDURE — 96375 TX/PRO/DX INJ NEW DRUG ADDON: CPT | Mod: XU

## 2023-02-19 PROCEDURE — 99232 SBSQ HOSP IP/OBS MODERATE 35: CPT | Performed by: INTERNAL MEDICINE

## 2023-02-19 PROCEDURE — 250N000013 HC RX MED GY IP 250 OP 250 PS 637: Performed by: EMERGENCY MEDICINE

## 2023-02-19 PROCEDURE — 96372 THER/PROPH/DIAG INJ SC/IM: CPT

## 2023-02-19 PROCEDURE — 84100 ASSAY OF PHOSPHORUS: CPT | Performed by: INTERNAL MEDICINE

## 2023-02-19 RX ORDER — ENOXAPARIN SODIUM 100 MG/ML
40 INJECTION SUBCUTANEOUS EVERY 24 HOURS
Status: DISCONTINUED | OUTPATIENT
Start: 2023-02-19 | End: 2023-02-20 | Stop reason: HOSPADM

## 2023-02-19 RX ORDER — AMLODIPINE BESYLATE 5 MG/1
5 TABLET ORAL ONCE
Status: COMPLETED | OUTPATIENT
Start: 2023-02-19 | End: 2023-02-19

## 2023-02-19 RX ORDER — AMLODIPINE BESYLATE 10 MG/1
10 TABLET ORAL DAILY
Status: DISCONTINUED | OUTPATIENT
Start: 2023-02-20 | End: 2023-02-20 | Stop reason: HOSPADM

## 2023-02-19 RX ADMIN — ACETAMINOPHEN 650 MG: 325 TABLET ORAL at 00:37

## 2023-02-19 RX ADMIN — AMLODIPINE BESYLATE 5 MG: 5 TABLET ORAL at 17:24

## 2023-02-19 RX ADMIN — ACETAMINOPHEN 650 MG: 325 TABLET ORAL at 08:04

## 2023-02-19 RX ADMIN — LOSARTAN POTASSIUM 100 MG: 100 TABLET, FILM COATED ORAL at 08:04

## 2023-02-19 RX ADMIN — MULTIPLE VITAMINS W/ MINERALS TAB 1 TABLET: TAB at 08:04

## 2023-02-19 RX ADMIN — CARVEDILOL 25 MG: 25 TABLET, FILM COATED ORAL at 08:04

## 2023-02-19 RX ADMIN — ENOXAPARIN SODIUM 40 MG: 40 INJECTION SUBCUTANEOUS at 11:23

## 2023-02-19 RX ADMIN — POTASSIUM CHLORIDE 20 MEQ: 1500 TABLET, EXTENDED RELEASE ORAL at 08:04

## 2023-02-19 RX ADMIN — ASPIRIN 81 MG: 81 TABLET, COATED ORAL at 08:04

## 2023-02-19 RX ADMIN — CARVEDILOL 25 MG: 25 TABLET, FILM COATED ORAL at 17:25

## 2023-02-19 RX ADMIN — HYDRALAZINE HYDROCHLORIDE 10 MG: 20 INJECTION, SOLUTION INTRAMUSCULAR; INTRAVENOUS at 11:26

## 2023-02-19 RX ADMIN — MAGNESIUM OXIDE 400 MG (241.3 MG MAGNESIUM) TABLET 400 MG: at 08:04

## 2023-02-19 RX ADMIN — FOLIC ACID 1 MG: 1 TABLET ORAL at 08:04

## 2023-02-19 RX ADMIN — AMLODIPINE BESYLATE 5 MG: 5 TABLET ORAL at 08:09

## 2023-02-19 RX ADMIN — POTASSIUM & SODIUM PHOSPHATES POWDER PACK 280-160-250 MG 1 PACKET: 280-160-250 PACK at 08:05

## 2023-02-19 RX ADMIN — SODIUM CHLORIDE, PRESERVATIVE FREE: 5 INJECTION INTRAVENOUS at 06:57

## 2023-02-19 RX ADMIN — POTASSIUM & SODIUM PHOSPHATES POWDER PACK 280-160-250 MG 1 PACKET: 280-160-250 PACK at 11:23

## 2023-02-19 RX ADMIN — CYANOCOBALAMIN TAB 1000 MCG 2500 MCG: 1000 TAB at 08:09

## 2023-02-19 RX ADMIN — THIAMINE HCL TAB 100 MG 100 MG: 100 TAB at 08:04

## 2023-02-19 RX ADMIN — ESCITALOPRAM 5 MG: 5 TABLET, FILM COATED ORAL at 11:23

## 2023-02-19 RX ADMIN — POTASSIUM & SODIUM PHOSPHATES POWDER PACK 280-160-250 MG 1 PACKET: 280-160-250 PACK at 17:24

## 2023-02-19 ASSESSMENT — ACTIVITIES OF DAILY LIVING (ADL)
ADLS_ACUITY_SCORE: 31

## 2023-02-19 NOTE — PLAN OF CARE
Goal Outcome Evaluation:    A/O x 4; Had elevated Bps; on RA. NS running at 100 ml/hr. Had xray of wrist. On CIWA. Will continue to monitor.

## 2023-02-19 NOTE — PROGRESS NOTES
Care Management Follow Up    Length of Stay (days): 2    Expected Discharge Date: 02/19/2023     Concerns to be Addressed: all concerns addressed in this encounter     Patient plan of care discussed at interdisciplinary rounds: Yes    Anticipated Discharge Disposition: Shelter     Anticipated Discharge Services: Other (see comment) (Mental radha and substance use, shelters, crisis lines, and other basic needs services/resources were given.)  Anticipated Discharge DME: None    Patient/family educated on Medicare website which has current facility and service quality ratings: yes  Education Provided on the Discharge Plan:    Patient/Family in Agreement with the Plan: other (see comments)    Referrals Placed by CM/SW:    Private pay costs discussed: Not applicable    Additional Information:  Met with patient today as follow from yesterday, patient is stating that she has not gotten through to the shelter phone numbers due to the weekend.  Encouraged her to call the [facility] number which she has listed in her resources. Patient states she has been to the [facility] in the past but does not know if she can walk in there, she will call there as well.  Patient will need SW to arrange taxi ride once a shelter is found.    NICKY Moreau  [phone]
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
## 2023-02-19 NOTE — PLAN OF CARE
Goal Outcome Evaluation: Progressing    POC reviewed with: Patient    BP (!) 171/90   Pulse 74   Temp 98.6  F (37  C) (Oral)   Resp 18   Wt 104.3 kg (230 lb)   SpO2 99%   BMI 42.07 kg/m      On RA.    Pertinent Assessments: A/Ox4. C/O headache, improved with tylenol. LS clear. Up ad juvenal. Voids via BR.   Major Shift Events: CIWAs discontinued. Echo completed. Phosphorus replaced. IVF discontinued. BP meds adjusted.   Treatment Plan: SW following. Monitor lytes. BP mgmt.

## 2023-02-20 VITALS
DIASTOLIC BLOOD PRESSURE: 107 MMHG | TEMPERATURE: 98.2 F | SYSTOLIC BLOOD PRESSURE: 155 MMHG | HEART RATE: 75 BPM | OXYGEN SATURATION: 100 % | RESPIRATION RATE: 18 BRPM | WEIGHT: 230 LBS | BODY MASS INDEX: 42.07 KG/M2
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
## 2023-02-20 PROBLEM — E87.6 HYPOKALEMIA: Status: ACTIVE | Noted: 2022-03-26

## 2023-02-20 LAB
DEPRECATED CALCIDIOL+CALCIFEROL SERPL-MC: 4 UG/L (ref 20–75)
ERYTHROCYTE [DISTWIDTH] IN BLOOD BY AUTOMATED COUNT: 17.1 % (ref 10–15)
HCT VFR BLD AUTO: 33.8 % (ref 35–47)
HGB BLD-MCNC: 12.2 G/DL (ref 11.7–15.7)
MAGNESIUM SERPL-MCNC: 1.6 MG/DL (ref 1.7–2.3)
MCH RBC QN AUTO: 36.7 PG (ref 26.5–33)
MCHC RBC AUTO-ENTMCNC: 36.1 G/DL (ref 31.5–36.5)
MCV RBC AUTO: 102 FL (ref 78–100)
PHOSPHATE SERPL-MCNC: 2.7 MG/DL (ref 2.5–4.5)
PLATELET # BLD AUTO: 177 10E3/UL (ref 150–450)
RBC # BLD AUTO: 3.32 10E6/UL (ref 3.8–5.2)
WBC # BLD AUTO: 3.8 10E3/UL (ref 4–11)

## 2023-02-20 PROCEDURE — 250N000013 HC RX MED GY IP 250 OP 250 PS 637: Performed by: INTERNAL MEDICINE

## 2023-02-20 PROCEDURE — 83735 ASSAY OF MAGNESIUM: CPT | Performed by: INTERNAL MEDICINE

## 2023-02-20 PROCEDURE — 85014 HEMATOCRIT: CPT | Performed by: INTERNAL MEDICINE

## 2023-02-20 PROCEDURE — 99238 HOSP IP/OBS DSCHRG MGMT 30/<: CPT | Performed by: INTERNAL MEDICINE

## 2023-02-20 PROCEDURE — G0378 HOSPITAL OBSERVATION PER HR: HCPCS

## 2023-02-20 PROCEDURE — 36415 COLL VENOUS BLD VENIPUNCTURE: CPT | Performed by: INTERNAL MEDICINE

## 2023-02-20 PROCEDURE — 250N000013 HC RX MED GY IP 250 OP 250 PS 637: Performed by: EMERGENCY MEDICINE

## 2023-02-20 PROCEDURE — 84100 ASSAY OF PHOSPHORUS: CPT | Performed by: INTERNAL MEDICINE

## 2023-02-20 RX ORDER — AMLODIPINE BESYLATE 10 MG/1
10 TABLET ORAL DAILY
Qty: 90 TABLET | Refills: 0 | Status: SHIPPED | OUTPATIENT
Start: 2023-02-21

## 2023-02-20 RX ORDER — ESCITALOPRAM OXALATE 5 MG/1
5 TABLET ORAL DAILY
Qty: 90 TABLET | Refills: 0 | Status: SHIPPED | OUTPATIENT
Start: 2023-02-21

## 2023-02-20 RX ADMIN — MULTIPLE VITAMINS W/ MINERALS TAB 1 TABLET: TAB at 09:33

## 2023-02-20 RX ADMIN — ESCITALOPRAM 5 MG: 5 TABLET, FILM COATED ORAL at 09:34

## 2023-02-20 RX ADMIN — CARVEDILOL 25 MG: 25 TABLET, FILM COATED ORAL at 09:34

## 2023-02-20 RX ADMIN — LOSARTAN POTASSIUM 100 MG: 100 TABLET, FILM COATED ORAL at 09:33

## 2023-02-20 RX ADMIN — MAGNESIUM OXIDE 400 MG (241.3 MG MAGNESIUM) TABLET 400 MG: at 09:34

## 2023-02-20 RX ADMIN — CYANOCOBALAMIN TAB 1000 MCG 2500 MCG: 1000 TAB at 09:33

## 2023-02-20 RX ADMIN — POTASSIUM CHLORIDE 20 MEQ: 1500 TABLET, EXTENDED RELEASE ORAL at 09:33

## 2023-02-20 RX ADMIN — ASPIRIN 81 MG: 81 TABLET, COATED ORAL at 09:33

## 2023-02-20 RX ADMIN — AMLODIPINE BESYLATE 10 MG: 10 TABLET ORAL at 09:34

## 2023-02-20 RX ADMIN — THIAMINE HCL TAB 100 MG 100 MG: 100 TAB at 09:34

## 2023-02-20 RX ADMIN — FOLIC ACID 1 MG: 1 TABLET ORAL at 09:33

## 2023-02-20 ASSESSMENT — ACTIVITIES OF DAILY LIVING (ADL)
ADLS_ACUITY_SCORE: 31
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
## 2023-02-20 NOTE — PLAN OF CARE
VSS. IV removed. Discharge instructions gone over and understanding verbalized. To take bus to shelter.
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
## 2023-02-21 ENCOUNTER — PATIENT OUTREACH (OUTPATIENT)
Dept: CARE COORDINATION | Facility: CLINIC | Age: 53
End: 2023-02-21
Payer: COMMERCIAL

## 2023-02-21 NOTE — PROGRESS NOTES
Clinic Care Coordination Contact  Community Health Worker Initial Outreach       CHW talked with Pt's brother, stated that he doesn't know where is his sister.  CHW reviewed chart, it shows Pt is homeless and there is no other contact no. for patient in chart.           MARCO A Fermin  [phone]  [facility]

## 2023-06-04 ENCOUNTER — HEALTH MAINTENANCE LETTER (OUTPATIENT)
Age: 53
End: 2023-06-04

## 2023-07-06 ENCOUNTER — HOSPITAL ENCOUNTER (EMERGENCY)
Facility: CLINIC | Age: 53
Discharge: HOME | End: 2023-07-07
Attending: EMERGENCY MEDICINE | Admitting: EMERGENCY MEDICINE
Payer: COMMERCIAL

## 2023-07-06 ENCOUNTER — MEDICAL CORRESPONDENCE (OUTPATIENT)
Dept: HEALTH INFORMATION MANAGEMENT | Facility: CLINIC | Age: 53
End: 2023-07-06
Payer: COMMERCIAL

## 2023-07-06 VITALS
TEMPERATURE: 98.8 F | HEART RATE: 105 BPM | SYSTOLIC BLOOD PRESSURE: 143 MMHG | DIASTOLIC BLOOD PRESSURE: 88 MMHG | OXYGEN SATURATION: 94 % | RESPIRATION RATE: 20 BRPM

## 2023-07-06 DIAGNOSIS — F10.920 ALCOHOLIC INTOXICATION WITHOUT COMPLICATION (H): ICD-10-CM

## 2023-07-06 DIAGNOSIS — F41.9 ANXIETY: ICD-10-CM

## 2023-07-06 LAB — ETHANOL SERPL-MCNC: 0.32 G/DL

## 2023-07-06 PROCEDURE — 36415 COLL VENOUS BLD VENIPUNCTURE: CPT | Performed by: EMERGENCY MEDICINE

## 2023-07-06 PROCEDURE — 250N000011 HC RX IP 250 OP 636: Mod: JZ

## 2023-07-06 PROCEDURE — 99285 EMERGENCY DEPT VISIT HI MDM: CPT | Mod: 25

## 2023-07-06 PROCEDURE — 82077 ASSAY SPEC XCP UR&BREATH IA: CPT | Performed by: EMERGENCY MEDICINE

## 2023-07-06 RX ORDER — OLANZAPINE 10 MG/2ML
10 INJECTION, POWDER, FOR SOLUTION INTRAMUSCULAR ONCE
Status: COMPLETED | OUTPATIENT
Start: 2023-07-06 | End: 2023-07-06

## 2023-07-06 RX ORDER — OLANZAPINE 10 MG/2ML
INJECTION, POWDER, FOR SOLUTION INTRAMUSCULAR
Status: COMPLETED
Start: 2023-07-06 | End: 2023-07-06

## 2023-07-06 RX ADMIN — OLANZAPINE 10 MG: 10 INJECTION, POWDER, FOR SOLUTION INTRAMUSCULAR at 14:44

## 2023-07-06 ASSESSMENT — ACTIVITIES OF DAILY LIVING (ADL)
ADLS_ACUITY_SCORE: 35

## 2023-07-06 NOTE — ED TRIAGE NOTES
Pt arrives with EMS due to  needs to control depression and anxiety as well as ETOH. Pt arrives on hold per ems. Pt attempting to get into 1/5 personal belongings bags to drink alcohol. Slurred speech, angry, attempting to push staff, yelling, and threatening. Code 21 called; pt in restraints and Zyprexa given. Pt admits to unknown amount of alcohol and meth use she received from the homeless shelter yesterday or today; denies chronic use. Pt has not taken her mental health medications recently and will not disclose when last doses were taken. ABC in tact.     Triage Assessment     Row Name 07/06/23 1529       Triage Assessment (Adult)    Airway WDL WDL       Respiratory WDL    Respiratory WDL WDL       Skin Circulation/Temperature WDL    Skin Circulation/Temperature WDL WDL       Cardiac WDL    Cardiac WDL WDL       Peripheral/Neurovascular WDL    Peripheral Neurovascular WDL WDL       Cognitive/Neuro/Behavioral WDL    Cognitive/Neuro/Behavioral WDL mood/behavior;X;speech    Level of Consciousness intermittent confusion    Arousal Level opens eyes spontaneously    Orientation oriented x 4    Speech slurred;illogical    Mood/Behavior agitated;angry;anxious;hyperactive (agitated, impulsive);restless       Pupils (CN II)    Pupil PERRLA yes    Pupil Size Left 4 mm    Pupil Size Right 4 mm       [place] Coma Scale    Best Eye Response 4-->(E4) spontaneous    Best Motor Response 6-->(M6) obeys commands    Best Verbal Response 5-->(V5) oriented    [place] Coma Scale Score 15

## 2023-07-06 NOTE — CONSULTS
7/6/2023  Jose JOE Corral 1970     Writer consulted with ED provider, Carlos A Sheth, on this date at 5:59 PM. It was determined that pt would not benefit from assessment at this time due to Pt unable able to participate in assessment as patient declined to participate in the DEC assessment and ended the [place] call.  requested BAL test results as patient was intoxicated when she came to the ER.    ED will call DEC at [phone] when pt is ready and able to participate in assessment.     OR DEC order has been closed at this time.    MARCEL Martin
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
## 2023-07-06 NOTE — DISCHARGE INSTRUCTIONS
Discharge Instructions  Alcohol Intoxication    You have been seen today with alcohol intoxication. This means that you have enough alcohol in your system to impair your ability to mentally and physically function, perhaps to the extent that you were unable to care for yourself.    Generally, every Emergency Department visit should have a follow-up clinic visit with either a primary or a specialty clinic/provider. Please follow-up as instructed by your emergency provider today.    You may have come to the Emergency Department because of your intoxication, or for another reason, such as because of an injury. No matter what the case is, this visit is a  red flag  regarding alcohol use, and you should consider whether your drinking pattern is a problem for you.     You may be at risk for alcohol-related problems if:    Men: you drink more than 14 drinks per week, or more than 4 drinks per occasion.    Women: you drink more than 7 drinks per week or more than 3 drinks per occasion.    You have black-outs.  You do things you regret while drinking.  You have legal problems because of drinking.  You have job problems because of drinking (you call in sick to work because of drinking).    CAGE Questions  Have you ever felt you should cut down on your drinking?  Have people annoyed you by criticizing your drinking?  Have you ever felt bad or guilty about your drinking?  Have you ever had a drink first thing in the morning to steady your nerves or get rid of a hangover (eye opener)?    If you answer yes to any of the CAGE questions, you may have a problem with alcohol.      Return to the Emergency Department if:  You become shaky or tremble when you try to stop drinking.   You have severe abdominal pain (belly pain).   You have a seizure or pass out.    You vomit (throw up) blood or have blood in your stool. This may be bright red or it may look like black coffee grounds.  You become lightheaded or faint.      For further  help, contact:   Your caregiver.    Alcoholics Anonymous (AA).    [facility] Intergroup: [phone]  [facility] Central Office: [phone]   A drug or alcohol rehabilitation program.    You can get information on alcohol resources and groups by calling the number 211 or 1-424.914.8469 on any phone.     Seek medical care if:  You have persistent vomiting.   You have persistent pain in any part of your body.    You do not feel better after a few days.    If you were given a prescription for medicine here today, be sure to read all of the information (including the package insert) that comes with your prescription.  This will include important information about the medicine, its side effects, and any warnings that you need to know about.  The pharmacist who fills the prescription can provide more information and answer questions you may have about the medicine.  If you have questions or concerns that the pharmacist cannot address, please call or return to the Emergency Department.   Remember that you can always come back to the Emergency Department if you are not able to see your regular doctor in the amount of time listed above, if you get any new symptoms, or if there is anything that worries you.        Aftercare Plan    Patient is currently on a waitlist for an IRTS facility and is recommended to follow through with this plan and continue to work with established therapist (Lourdes) and psychiatrist (Kris) for care in the interim.          If I am feeling unsafe or I am in a crisis, I will:   Contact my established care providers   Call the National Suicide Prevention Lifeline: 870.144.8358   Go to the nearest emergency room   Call 864     Warning signs that I or other people might notice when a crisis is developing for me:     I am having increasing suicidal thoughts that turn to plans with intent or means  I am having additional urges to self-harm    My emotions are of hopelessness;  feeling like there's no way out.       Things I am able to do on my own to cope or help me feel better:    Play board games  Take a walk       Things that I am able to do with others to cope or help me better:   Ask for help  Play game       Things I can use or do for distraction:   Listen to music  Watch movie/TV  Journaling  Reading a book  Meditating  Call a friend     Changes I can make to support my mental health and wellness:    -I will abstain from all mood altering chemicals not currently prescribed to me   -I will attend scheduled mental health therapy and psychiatric appointments and follow all   recommendations  -I will commit to 30 minutes of self care daily - this can be as simple as taking a shower, going for a   walk, cooking a meal, read, writing, etc  -I will practice square breathing when I begin to feel anxious - in breath through the nose for the count   of 4 and the first line on the square. Out breath through the mouth for the count of 4 for the second line   of the square. Repeat to complete the square. Repeat the square as many times as needed.  - I will use distraction skills of: going for walks, watching TV, spending time outside, calling a friend or   family member  -Use community resources, including hotline numbers, [facility] crisis and support meetings  -Maintain a daily schedule/routine  -Practice deep breathing skills       People in my life that I can ask for help:   Family  Friends  Therapist      Your [facility] has a mental health crisis team you can call 24/7: [facility] Mobile Crisis  [phone]           Crisis Lines  Crisis Text Line  Text 832387  You will be connected with a trained live crisis counselor to provide support.    Por jolanta, texto  JAYJAY a 576217 o texto a 442-AYUDAME en WhatsApp    [employer] (LGBTQ Youth Crisis Line)  2.261.315.4809  text START to 615-210      Community Resources  Fast Tracker  Linking people to mental health and substance use  "disorder resources  [employer]     [place] [facility] Warm Line  Peer to peer support  Monday thru Saturday, 12 pm to 10 pm  052.441.0223 or 9.604.874.0023  Text \"Support\" to 36516    National [place] on Mental Illness (SUKUMAR)  474.207.4345 or 1.888.SUKUMAR.HELPS      Mental Health Apps  My3  https://[employer]/    [employer]  https://[employer].org/apps/virtual-hope-box/      Crisis Lines  Crisis Text Line  Text 049079  You will be connected with a trained live crisis counselor to provide support.    Por espanol, texto  JAYJAY a 089088 o texto a 442-AYUDAME en WhatsApp    National Hope Line  1.800.SUICIDE [9612635]      Community Resources  Fast Tracker  Linking people to mental health and substance use disorder resources  [employer]     [place] [facility] Warm Line  Peer to peer support  Monday thru Saturday, 12 pm to 10 pm  543.454.4394 or 9.314.729.1214  Text \"Support\" to 81962    [employer] [place] [employer] Mental Illness (SUKUMAR)  016.437.7547 or 1.888.SUKUMAR.HELPS      Mental Health Apps  My3  https://Mapori/    [employer]  https://[employer].org/apps/virtual-hope-box/      Additional Information  Today you were seen by a licensed mental health professional through Triage and Transition services, [facility] ([facility])  for a crisis assessment in the Emergency Department at [facility].  It is recommended that you follow up with your established providers (psychiatrist, mental health therapist, and/or primary care doctor - as relevant) as soon as possible. Coordinators from [facility] will be calling you in the next 24-48 hours to ensure that you have the resources you need.  You can also contact [facility] coordinators directly at [phone]. You may have been scheduled for or offered an appointment with a mental health provider. [facility] maintains an extensive network of licensed behavioral health providers to connect patients with the services they need.  We " do not charge providers a fee to participate in our referral network.  We match patients with providers based on a patient's specific needs, insurance coverage, and location.  Our first effort will be to refer you to a provider within your care system, and will utilize providers outside your care system as needed.

## 2023-07-06 NOTE — CONSULTS
This  called Emre Corral (Brother) at [phone] and attempted to obtain Collateral information. Emre is listed as the patient's emergency .  was unable to reach him or leave a vm. His voicemail box has not been set up yet.     MOY Latham, LICSW, Psychotherapist  [facility] - Triage & Transition Services  Callback: [phone]

## 2023-07-07 PROCEDURE — 90791 PSYCH DIAGNOSTIC EVALUATION: CPT

## 2023-07-07 ASSESSMENT — COLUMBIA-SUICIDE SEVERITY RATING SCALE - C-SSRS
5. HAVE YOU STARTED TO WORK OUT OR WORKED OUT THE DETAILS OF HOW TO KILL YOURSELF? DO YOU INTEND TO CARRY OUT THIS PLAN?: NO
1. SINCE LAST CONTACT, HAVE YOU WISHED YOU WERE DEAD OR WISHED YOU COULD GO TO SLEEP AND NOT WAKE UP?: NO
TOTAL  NUMBER OF INTERRUPTED ATTEMPTS SINCE LAST CONTACT: NO
SUICIDE, SINCE LAST CONTACT: NO
REASONS FOR IDEATION SINCE LAST CONTACT: EQUALLY TO GET ATTENTION, REVENGE, OR A REACTION FROM OTHERS AND TO END/STOP THE PAIN
TOTAL  NUMBER OF ABORTED OR SELF INTERRUPTED ATTEMPTS SINCE LAST CONTACT: NO
ATTEMPT SINCE LAST CONTACT: NO
2. HAVE YOU ACTUALLY HAD ANY THOUGHTS OF KILLING YOURSELF?: YES
6. HAVE YOU EVER DONE ANYTHING, STARTED TO DO ANYTHING, OR PREPARED TO DO ANYTHING TO END YOUR LIFE?: NO

## 2023-07-07 ASSESSMENT — ACTIVITIES OF DAILY LIVING (ADL)
ADLS_ACUITY_SCORE: 35

## 2023-07-07 NOTE — CONSULTS
"Diagnostic Evaluation Consultation  Crisis Assessment    Patient was assessed: Caesar  Patient location: declocations: [facility] Emergency Department  Was a release of information signed: No. Reason: on file        Referral Data and Chief Complaint  Jose Corral (Char) is a 52 year old, who uses she/her pronouns, and presents to the ED via EMS. Patient is referred to the ED by community provider(s). Patient is presenting to the ED for the following concerns: alcohol intoxication and mental health.      Informed Consent and Assessment Methods     Patient is her own guardian. Writer met with patient and explained the crisis assessment process, including applicable information disclosures and limits to confidentiality, assessed understanding of the process, and obtained consent to proceed with the assessment. Patient was observed to be able to participate in the assessment as evidenced by alert and engaged appropriately. Assessment methods included conducting a formal interview with patient, review of medical records, collaboration with medical staff, and obtaining relevant collateral information from family and community providers when available..     Over the course of this crisis assessment provided reassurance, offered validation, engaged patient in problem solving and disposition planning and worked with patient on safety and aftercare planning. Patient's response to interventions was receptive and appreciative.     Summary of Patient Situation     Patient states \"I have a lot going on in my life...I was drinking and at a friend's house...A lot of thoughts coming in my head, missing my parents, uncertainty of when my friends would be coming back, not knowing if I could keep my bed at the shelter.\"  Patient reports she was staying at a friend's apartment while they were out of town.  She states, \"I just want to get back to the shelter so they don't give my bed away.  I've been waiting to get into an IRTS " "facility\".  Pt reports she drinks to help her sleep.  She states she is experiencing a lot of anxiety and worry because of uncertainty.  Pt expresses she wants to get into the IRTS facility, and is staying at the shelter while she waits, but was unable to get back to the shelter because her friend stayed longer out of town than expected and pt fears she'll lose her spot.        She has multiple episodes of ED encounters and DEC assessments in the past year (2/17, 12/22, 10/5, 9/1, 7/30), with similar presentation of intoxication and suicidal ideation.  Though she has had multiple ED visits she denies admission to psychiatric unit.  Pt endorses passive suicidal ideation, which appears to be baseline from chart review.  She reports thoughts such as \"what purpose do I have here?\".  Pt denies having plan or intent.  Patient had a previous suicide attempt at age 19.  She denies self harm.  Patient reports coping skills that are helpful to manage her distress are prayer and playing board games.      Patient is tearful at times during interview, expressing desire to get back to the shelter so she can follow through with her plan of getting into the IRTS facility.  Pt seems genuine in her desire to make change which appears to be progress as previous DEC assessments indicates poor follow through in the past. Additionally, it does not appear patient has had an ED admission within the past few months, which may coincide with her regularly seeing a therapist and psychiatrist.         Brief Psychosocial History  Pt shared her parents were  and both passed away.  Pt reported having one brother.  Pt was , has been single and has one adult son who lived in another state.  She is currently homeless and has been staying at the [facility] in [place].  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  Per chart review, pt " reports no family history of MI/CD issues.  Pt has a history of hypertension, chronic pain and chest pain.  Pt denied having legal issues.         Significant Clinical History     Pt has a history of depression, anxiety, suicidal ideations and alcohol abuse.  Patient reports she has been seeing a therapist since March and meets with her most weeks.  She also has been seeing a psychiatrist and is prescribed medications.  Pt denies history of CD treatment.  She indicates experiencing a lot of anxiety, worry and sleep difficulty.       Collateral Information    Collateral contact was attempted with emergency contact (brother) but no response.    Patient's chart was reviewed and noted in current assessment.         Risk Assessment  [place] Suicide Severity Rating Scale Full Clinical Version:2/17/23                [place] Suicide Severity Rating Scale Since Last Contact: 7/7/23  Suicidal Ideation (Since Last Contact)  1. Wish to be Dead (Since Last Contact): No  2. Non-Specific Active Suicidal Thoughts (Since Last Contact): Yes  3. Active Suicidal Ideation with any Methods (Not Plan) Without Intent to Act (Since Last Contact): No  4. Active Suicidal Ideation with Some Intent to Act, Without Specific Plan (Since Last Contact): No  5. Active Suicidal Ideation with Specific Plan and Intent (Since Last Contact): No  Suicidal Behavior (Since Last Contact)  Actual Attempt (Since Last Contact): No  Has subject engaged in non-suicidal self-injurious behavior? (Since Last Contact): No  Interrupted Attempts (Since Last Contact): No  Aborted or Self-Interrupted Attempt (Since Last Contact): No  Preparatory Acts or Behavior (Since Last Contact): No  Suicide (Since Last Contact): No     C-SSRS Risk (Since Last Contact)  Calculated C-SSRS Risk Score (Since Last Contact): Low Risk    Validity of evaluation is not impacted by presenting factors during interview .   Comments regarding subjective versus objective responses to [place]  "tool: n/a  Environmental or Psychosocial Events: helplessness/hopelessness, unemployment/underemployment, unstable housing, homelessness and ongoing abuse of substances  Chronic Risk Factors: chronic and ongoing sleep difficulties and history of abuse or neglect   Warning Signs: seeking access to means to hurt or kill self, hopelessness, increasing substance use or abuse and anxiety, agitation, unable to sleep, sleeping all the time  Protective Factors: supportive ongoing medical and mental health care relationships and help seeking  Interpretation of Risk Scoring, Risk Mitigation Interventions and Safety Plan:  Pt has baseline passive suicidal ideation.  She denies plan or intent.  She is able to contract for safety.         Does the patient have thoughts of harming others? No     Is the patient engaging in sexually inappropriate behavior?  no        Current Substance Abuse     Is there recent substance abuse? Substance type(s): alcohol Frequency: 2-3 times a week Quantity: \"a lot\" Method: oral Duration: varies Last use: 7/5/23        Was a urine drug screen or blood alcohol level obtained: Yes DARIN 0.3       Mental Status Exam     Affect: Flat   Appearance: Appropriate    Attention Span/Concentration: Attentive  Eye Contact: Engaged   Fund of Knowledge: Appropriate    Language /Speech Content: Fluent   Language /Speech Volume: Normal    Language /Speech Rate/Productions: Normal    Recent Memory: Intact   Remote Memory: Intact   Mood: Anxious    Orientation to Person: Yes    Orientation to Place: Yes   Orientation to Time of Day: Yes    Orientation to Date: Yes    Situation (Do they understand why they are here?): Yes    Psychomotor Behavior: Normal    Thought Content: Clear   Thought Form: Intact      History of commitment: No           Medication    No current facility-administered medications for this encounter.     Current Outpatient Medications   Medication     acetaminophen (TYLENOL) 500 MG tablet     " amLODIPine (NORVASC) 10 MG tablet     carvedilol (COREG) 25 MG tablet     escitalopram (LEXAPRO) 5 MG tablet     folic acid (FOLVITE) 1 MG tablet     losartan (COZAAR) 100 MG tablet     magnesium oxide (MAG-OX) 400 MG tablet     multivitamin, therapeutic (THERA-VIT) TABS tablet     potassium chloride ER (K-TAB) 20 MEQ CR tablet     vitamin B-12 (CYANOCOBALAMIN) 2500 MCG sublingual tablet         Psychotropic medications: Yes. Pt is currently taking Zoloft and Hydroxyzine. Medication compliant: No: ED notes indicate pt hasn't taken recently. Recent medication changes: No  Medication changes made in the last two weeks: No       Current Care Team    Primary Care Provider: No  Psychiatrist: Yes. Name: Kris. Location: [facility]. Date of last visit: 3 weeks ago. Frequency: as needed. Perceived helpfulness: yes.  Therapist: Yes. Name: Lourdes. Location: she comes to the shelter. Date of last visit: 2 weeks ago. Frequency: weekly. Perceived helpfulness: yes, helpful.  : No     CTSS or ARMHS: No  ACT Team: No  Other: No      Diagnosis    300.02 (F41.1) Generalized Anxiety Disorder   296.32 (F33.1) Major Depressive Disorder, Recurrent Episode, Moderate _ - by history   Substance-Related & Addictive Disorders Alcohol Use Disorder   303.90 (F10.20) Severe  - by history     Clinical Summary and Substantiation of Recommendations    After therapeutic assessment, intervention and aftercare planning by ED care team and [facility] and in consultation with attending provider, the patient's circumstances and mental state were appropriate for outpatient management. It is the recommendation of this clinician that pt discharge with OP MH support. A this time the pt is not presenting as an acute risk to self or others due to the following factors: Pt presented to the ED with alcohol intoxication and passive suicidal ideation.  Pt's suicidal ideation appears to be baseline from chart history.  She denies plan or  intent, with one previous attempt at age 19.  Pt denies self harm.  She reports she is currently working with a psychiatrist and therapist for the past 3-4 months and is currently on the waitlist for an IRTS program.  She expresses, what appears to be, genuine desire to follow through with this plan.  This appears to be an appropriate plan to address her mental health, substance use and housing needs.       Disposition    Recommended disposition: Individual Therapy, Medication Management and Residential Treatment: IRTS       Reviewed case and recommendations with attending provider. Attending Name: Dr. Florentino       Attending concurs with disposition: Yes       Patient and/or validated legal guardian concurs with disposition: Yes       Final disposition: Individual therapy , Medication management and Residential treatment: IRTS.       Outpatient Details (if applicable):   Aftercare plan and appointments placed in the AVS and provided to patient: Yes. Given to patient by ED staff    Was lethal means counseling provided as a part of aftercare planning? No;       Assessment Details    Patient interview started at: 3:26am and completed at: 3:56am.     Total time spent with the patient or their family: .50 hrs      CPT code(s) utilized: 47647 - Psychotherapy for Crisis - 60 (30-74*) min       Jocelyn Kehr Sparby, LPCC, LADC, MS, LPCC, LADC, Psychotherapist  DEC - Triage & Transition Services  Callback: [phone]      Aftercare Plan    Patient is currently on a waitlist for an IRTS facility and is recommended to follow through with this plan and continue to work with established therapist (Lourdes) and psychiatrist (Kris) for care in the interim.          If I am feeling unsafe or I am in a crisis, I will:   Contact my established care providers   Call the National Suicide Prevention Lifeline: 549.598.2172   Go to the nearest emergency room   Call 911     Warning signs that I or other people might notice when a crisis is  developing for me:     I am having increasing suicidal thoughts that turn to plans with intent or means  I am having additional urges to self-harm    My emotions are of hopelessness; feeling like there's no way out.       Things I am able to do on my own to cope or help me feel better:    Play board games  Take a walk       Things that I am able to do with others to cope or help me better:   Ask for help  Play game       Things I can use or do for distraction:   Listen to music  Watch movie/TV  Journaling  Reading a book  Meditating  Call a friend     Changes I can make to support my mental health and wellness:    -I will abstain from all mood altering chemicals not currently prescribed to me   -I will attend scheduled mental health therapy and psychiatric appointments and follow all   recommendations  -I will commit to 30 minutes of self care daily - this can be as simple as taking a shower, going for a   walk, cooking a meal, read, writing, etc  -I will practice square breathing when I begin to feel anxious - in breath through the nose for the count   of 4 and the first line on the square. Out breath through the mouth for the count of 4 for the second line   of the square. Repeat to complete the square. Repeat the square as many times as needed.  - I will use distraction skills of: going for walks, watching TV, spending time outside, calling a friend or   family member  -Use community resources, including hotline numbers, [facility] crisis and support meetings  -Maintain a daily schedule/routine  -Practice deep breathing skills       People in my life that I can ask for help:   Family  Friends  Therapist      Your [facility] has a mental health crisis team you can call 24/7: [facility] Mobile Crisis  [phone]           Crisis Lines  Crisis Text Line  Text 362154  You will be connected with a trained live crisis counselor to provide support.    Por jolanta, texto  JAYJAY a 211633 o texto a 442-AYUDAME en  "WhatsApp    [employer] (LGBTQ Youth Crisis Line)  7.089.415.0043  text START to 155-043      Community [employer]  Fast Tracker  Linking people to mental health and substance use disorder resources  [employer]     [facility] Warm Line  Peer to peer support  Monday thru Saturday, 12 pm to 10 pm  411.290.7014 or 8.442.300.8299  Text \"Support\" to 36536    [place] on Mental Illness (SUKUMAR)  747.417.2738 or 1.888.SUKUMAR.HELPS      Mental Health Apps  My3  https://[employer]/    [employer]  https://Medius.org/apps/virtual-hope-box/      Crisis Lines  Crisis Text Line  Text 482287  You will be connected with a trained live crisis counselor to provide support.    Por espanol, texto  JAYJAY a 645808 o texto a 442-AYUDAME en WhatsApp    National Hope Line  1.800.SUICIDE [5180111]      51intern.com Ã¨â€¹Â±Ã¨â€¦Â¾Ã§Â½â€˜  Fast Tracker  Linking people to mental health and substance use disorder resources  [employer].XZERES     [place] [facility] Warm Line  Peer to peer support  Monday thru Saturday, 12 pm to 10 pm  856.873.8514 or 9.549.665.9008  Text \"Support\" to 82708    [employer] [place] on Mental Illness (SUKUMAR)  502.014.1498 or 1.888.SUKUMAR.HELPS      Mental Health Apps  My3  https://[employer]/    [employer]  https://Medius.org/apps/virtual-hope-box/      Additional Information  Today you were seen by a licensed mental health professional through Triage and Transition services, Behavioral Healthcare Providers ([facility])  for a crisis assessment in the Emergency Department at [facility].  It is recommended that you follow up with your established providers (psychiatrist, mental health therapist, and/or primary care doctor - as relevant) as soon as possible. Coordinators from [facility] will be calling you in the next 24-48 hours to ensure that you have the resources you need.  You can also contact [facility] coordinators directly at [phone]. You may have been scheduled " for or offered an appointment with a mental health provider. [facility] maintains an extensive network of licensed behavioral health providers to connect patients with the services they need.  We do not charge providers a fee to participate in our referral network.  We match patients with providers based on a patient's specific needs, insurance coverage, and location.  Our first effort will be to refer you to a provider within your care system, and will utilize providers outside your care system as needed.

## 2023-07-07 NOTE — ED NOTES
RN ED Mental Health Handoff Note    GISEL    Does patient require 1:1? No    Hold and rights been given and documented for patient: Yes    Is the patient in BH scrubs? Yes    Has the patient been searched? Yes    Is the 15 minute observation tool up to date? Yes    Was patient issued a welcome folder? No -intoxicated    Room check completed this shift: Yes    PSS3 and [place] Assessment/Reassessment this shift:    PSS-3      Date and Time Over the past 2 weeks have you felt down, depressed, or hopeless? Over the past 2 weeks have you had thoughts of killing yourself? Have you ever attempted to kill yourself? When did this last happen? User   07/06/23 1528 yes yes yes more than 6 months ago JJ          C-SSRS ([place])      Date and Time Q1 Wished to be Dead (Past Month) Q2 Suicidal Thoughts (Past Month) Q3 Suicidal Thought Method Q4 Suicidal Intent without Specific Plan Q5 Suicide Intent with Specific Plan Q6 Suicide Behavior (Lifetime) Within the Past 3 Months? RETIRED: Level of Risk per Screen Screening Not Complete User   07/06/23 1528 yes yes no no no no -- -- -- JJ   07/06/23 1440 -- -- -- -- -- -- -- -- Refuses to answer J            Behavioral status of patient: Green    Code 21 called this shift? Yes    Use of restraints/seclusion this shift? Yes. Details: 24 minutes, out of restraints     Most recent vital signs:  Temp: 98.8  F (37.1  C) Temp src: Temporal BP: (!) 143/88 Pulse: 105   Resp: 20 SpO2: 94 % O2 Device: None (Room air)      Medications:  Scheduled medication compliance? Yes    PRN Meds administered this shift? No    Medications   OLANZapine (zyPREXA) injection 10 mg (10 mg Intramuscular $Given 7/6/23 0279)         ADLs    Meal Provided this shift? Yes, water, pt was sleeping    Hygiene items provided? Yes    ADLs completed? Yes    Date of last shower: pta    Any significant events this shift? Yes. Details: code 21, restraints    Any information that would be helpful in caring for this  patient?  Awaiting DEC after clinically sober    Family present/updated? Yes    Location of patient's belongings: room 5    Critical Care Minutes:  Does the patient need critical care minutes documented? No

## 2023-07-07 NOTE — ED PROVIDER NOTES
I received signout from Dr. Sheth to follow-up on DEC evaluation.  Per DEC patient is not suicidal and is appropriate for outpatient management.  She is now clinically sober is appropriate for discharge.     Osmin Florentino MD  07/07/23 0404

## 2023-09-29 NOTE — PROVIDER NOTIFICATION
/120 and experiencing palpitations. Please advise. Will give PRN hydralazine   
Provider Notified: Temp was 99.2 and received Tylenol at 1345 and now it 99.5. Lethargic. Advice??    Addendum:   Md states to continue monitoring. Not to give any further ativan.     
Pt BP recheck was 221/110. Please advise, thanks. Pt is also becoming more tremorous, MARIO ALBERTO.     
This writer received verbal orders to discontinue 1:1 sitter. Primary SHWETA eastman  
 used

## 2023-10-14 ENCOUNTER — HEALTH MAINTENANCE LETTER (OUTPATIENT)
Age: 53
End: 2023-10-14

## 2024-02-09 ENCOUNTER — HOSPITAL ENCOUNTER (EMERGENCY)
Facility: CLINIC | Age: 54
Discharge: HOME | End: 2024-02-10
Attending: EMERGENCY MEDICINE | Admitting: EMERGENCY MEDICINE
Payer: COMMERCIAL

## 2024-02-09 DIAGNOSIS — F10.929 ALCOHOLIC INTOXICATION WITH COMPLICATION (H): ICD-10-CM

## 2024-02-09 DIAGNOSIS — R45.851 SUICIDAL IDEATION: ICD-10-CM

## 2024-02-09 LAB
BASOPHILS # BLD AUTO: 0 10E3/UL (ref 0–0.2)
BASOPHILS NFR BLD AUTO: 1 %
EOSINOPHIL # BLD AUTO: 0.1 10E3/UL (ref 0–0.7)
EOSINOPHIL NFR BLD AUTO: 1 %
ERYTHROCYTE [DISTWIDTH] IN BLOOD BY AUTOMATED COUNT: 15.8 % (ref 10–15)
ETHANOL SERPL-MCNC: 0.22 G/DL
HCT VFR BLD AUTO: 41.3 % (ref 35–47)
HGB BLD-MCNC: 13.9 G/DL (ref 11.7–15.7)
IMM GRANULOCYTES # BLD: 0 10E3/UL
IMM GRANULOCYTES NFR BLD: 0 %
LYMPHOCYTES # BLD AUTO: 1.8 10E3/UL (ref 0.8–5.3)
LYMPHOCYTES NFR BLD AUTO: 24 %
MCH RBC QN AUTO: 28.8 PG (ref 26.5–33)
MCHC RBC AUTO-ENTMCNC: 33.7 G/DL (ref 31.5–36.5)
MCV RBC AUTO: 86 FL (ref 78–100)
MONOCYTES # BLD AUTO: 0.3 10E3/UL (ref 0–1.3)
MONOCYTES NFR BLD AUTO: 4 %
NEUTROPHILS # BLD AUTO: 5.3 10E3/UL (ref 1.6–8.3)
NEUTROPHILS NFR BLD AUTO: 70 %
NRBC # BLD AUTO: 0 10E3/UL
NRBC BLD AUTO-RTO: 0 /100
PLATELET # BLD AUTO: 395 10E3/UL (ref 150–450)
RBC # BLD AUTO: 4.82 10E6/UL (ref 3.8–5.2)
WBC # BLD AUTO: 7.5 10E3/UL (ref 4–11)

## 2024-02-09 PROCEDURE — 82077 ASSAY SPEC XCP UR&BREATH IA: CPT | Performed by: EMERGENCY MEDICINE

## 2024-02-09 PROCEDURE — 85025 COMPLETE CBC W/AUTO DIFF WBC: CPT | Performed by: EMERGENCY MEDICINE

## 2024-02-09 PROCEDURE — 99285 EMERGENCY DEPT VISIT HI MDM: CPT | Performed by: EMERGENCY MEDICINE

## 2024-02-09 PROCEDURE — 36415 COLL VENOUS BLD VENIPUNCTURE: CPT | Performed by: EMERGENCY MEDICINE

## 2024-02-09 ASSESSMENT — ACTIVITIES OF DAILY LIVING (ADL): ADLS_ACUITY_SCORE: 37

## 2024-02-09 ASSESSMENT — LIFESTYLE VARIABLES: INTOXICATION: 1

## 2024-02-10 VITALS
HEART RATE: 80 BPM | RESPIRATION RATE: 16 BRPM | DIASTOLIC BLOOD PRESSURE: 96 MMHG | SYSTOLIC BLOOD PRESSURE: 167 MMHG | OXYGEN SATURATION: 98 % | TEMPERATURE: 98.1 F

## 2024-02-10 PROBLEM — F10.10 ALCOHOL ABUSE: Status: ACTIVE | Noted: 2024-02-10

## 2024-02-10 PROBLEM — F41.1 GENERALIZED ANXIETY DISORDER: Status: ACTIVE | Noted: 2023-02-20

## 2024-02-10 PROBLEM — F32.A DEPRESSION, UNSPECIFIED DEPRESSION TYPE: Status: ACTIVE | Noted: 2021-04-27

## 2024-02-10 PROCEDURE — 250N000013 HC RX MED GY IP 250 OP 250 PS 637: Performed by: EMERGENCY MEDICINE

## 2024-02-10 RX ORDER — HYDROXYZINE HYDROCHLORIDE 50 MG/1
50 TABLET, FILM COATED ORAL ONCE
Status: COMPLETED | OUTPATIENT
Start: 2024-02-10 | End: 2024-02-10

## 2024-02-10 RX ORDER — MULTIVITAMIN,THERAPEUTIC
1 TABLET ORAL ONCE
Status: COMPLETED | OUTPATIENT
Start: 2024-02-10 | End: 2024-02-10

## 2024-02-10 RX ORDER — MAGNESIUM OXIDE 400 MG/1
800 TABLET ORAL ONCE
Status: COMPLETED | OUTPATIENT
Start: 2024-02-10 | End: 2024-02-10

## 2024-02-10 RX ORDER — FOLIC ACID 1 MG/1
1 TABLET ORAL ONCE
Status: COMPLETED | OUTPATIENT
Start: 2024-02-10 | End: 2024-02-10

## 2024-02-10 RX ADMIN — HYDROXYZINE HYDROCHLORIDE 50 MG: 50 TABLET, FILM COATED ORAL at 07:54

## 2024-02-10 RX ADMIN — FOLIC ACID 1 MG: 1 TABLET ORAL at 01:16

## 2024-02-10 RX ADMIN — THERA TABS 1 TABLET: TAB at 01:16

## 2024-02-10 RX ADMIN — THIAMINE HCL TAB 100 MG 100 MG: 100 TAB at 01:16

## 2024-02-10 RX ADMIN — MAGNESIUM OXIDE TAB 400 MG (241.3 MG ELEMENTAL MG) 800 MG: 400 (241.3 MG) TAB at 01:16

## 2024-02-10 ASSESSMENT — ACTIVITIES OF DAILY LIVING (ADL)
ADLS_ACUITY_SCORE: 37

## 2024-02-10 NOTE — ED NOTES
BEC Coordinator spoke with the RN to see if patient is appropriate to be seen by a virtual DEC . RN stated that patient is not really engaging in conversation and is soft spoken. Coordinator and RN decided that patient would benefit more with an in-person assessment in the AM.

## 2024-02-10 NOTE — ED NOTES
Bed: UREDH-D  Expected date: 2/9/24  Expected time: 9:32 PM  Means of arrival:   Comments:  [address]  52 y/o F ETOH and SI

## 2024-02-10 NOTE — DISCHARGE INSTRUCTIONS
Patient to return to the [facility] in [facility]. Patient to continue Intensive Outpatient substance use treatment.     MOY Patel, [facility] HospitalSW, Psychotherapist

## 2024-02-10 NOTE — ED TRIAGE NOTES
"BIBA per report pt feeling suicidal, also drunk and is incontinent. Per EMS pt was picked up at \"some facility\"     Triage Assessment (Adult)       Row Name 02/09/24 1073          Triage Assessment    Airway WDL WDL        Respiratory WDL    Respiratory WDL WDL        Cardiac WDL    Cardiac WDL WDL        Peripheral/Neurovascular WDL    Peripheral Neurovascular WDL X        Cognitive/Neuro/Behavioral WDL    Cognitive/Neuro/Behavioral WDL WDL                     "
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
## 2024-02-10 NOTE — ED PROVIDER NOTES
Sign out Provider: Lilliam  Sign out Plan: 53-year-old female with history of alcohol abuse, asthma, diabetes, hypertension, prior DVT, sarcoidosis who presents to the emergency department with suicidal ideation and alcohol intoxication.  Patient is currently awaiting [facility] evaluation for mental health assessment and disposition planning.    Reassessment: Patient was seen and evaluated by [facility] assessors.  She denies any ongoing suicidal ideation.  She reports she has had a number of stressors recently which are triggering to her but does not feel she is able to keep herself safe.  She is currently at a residential treatment facility for alcohol and they are willing to take her back.  They do have therapy staff onsite and are able to lock up her medications for additional safety.  Patient denies any acute medical concerns.  Her mental status has cleared and is appropriate for discharge to home at this time.    Disposition: Discharge         Yee Mcadams MD  02/10/24 1016

## 2024-02-10 NOTE — ED NOTES
Pt is A/O x 3, pleasant.Incontinent with bowel. Pt was able to clean self after staff set up the supplies  VSS. MSSA score:1. Unsteady on feet, SBA.   Pt soft spoken when being assessed pt usually faces the wall.  Pt did contract to safety.

## 2024-02-10 NOTE — ED PROVIDER NOTES
"ED Provider Note  [facility]      History     Chief Complaint   Patient presents with    Suicidal     BIBA per report pt feeling suicidal, also drunk and is incontinent. Per EMS pt was picked up at \"some facility\"    Alcohol Intoxication     The history is provided by the patient and medical records. The history is limited by the condition of the patient.   Alcohol Intoxication    Jose Corral is a 53-year-old female with a history of alcohol abuse, asthma, diabetes, previous DVT, sarcoidosis, hypertension, obesity, homelessness, who presents to the emergency department today after being picked up at \"some facility\" (per EMS) feeling suicidal.  She is intoxicated.  She is unable to provide much in the way of any sort of coherent history due to her intoxication.  She does state that she was drinking today but does not know what, how much, or how often she drinks.  She says that she \"(does not) like myself\" and does admit to feeling suicidal.  She has had some chronic SI for months but over the past few days this has increased.  She has had thoughts of overdosing on pills.  Patient states she does have pills in her backpack.  She denies doing anything tonight to harm herself.  She is not able to cooperate with further questioning, is not able to tell me if anything has been going on recently in her life that is making things more difficult, other than the fact that she mentions she has to wear bilateral wrist braces for carpal tunnel syndrome and this is bothersome to her.  The remainder of the interview is largely nonsensical, she is doing multiplication tables out loud and is not really cooperating with questions, is facing the wall during most of the interview and mumbling, covering herself with a blanket.  Of note she was incontinent prior to arrival.      This part of the medical record was transcribed by Preston Albrecht, Medical Scribe, from a dictation done by Rochelle" MD Nydia.     Past Medical History  Past Medical History:   Diagnosis Date    Alcohol abuse     Diabetes (H)      No past surgical history on file.  acetaminophen (TYLENOL) 500 MG tablet  amLODIPine (NORVASC) 10 MG tablet  carvedilol (COREG) 25 MG tablet  escitalopram (LEXAPRO) 5 MG tablet  folic acid (FOLVITE) 1 MG tablet  losartan (COZAAR) 100 MG tablet  magnesium oxide (MAG-OX) 400 MG tablet  multivitamin, therapeutic (THERA-VIT) TABS tablet  potassium chloride ER (K-TAB) 20 MEQ CR tablet  vitamin B-12 (CYANOCOBALAMIN) 2500 MCG sublingual tablet      No Known Allergies  Family History  Family History   Problem Relation Age of Onset    Diabetes Mother     Heart Disease Mother     Unknown/Adopted Father     Cerebrovascular Disease Paternal Grandmother     Diabetes Paternal Grandmother      Social History   Social History     Tobacco Use    Smoking status: Every Day    Smokeless tobacco: Never   Vaping Use    Vaping Use: Never used   Substance Use Topics    Alcohol use: Yes    Drug use: Not Currently         A medically appropriate review of systems was performed with pertinent positives and negatives noted in the HPI, and all other systems negative.    Physical Exam   BP: 127/87  Pulse: 61  Temp: 97.8  F (36.6  C)  Resp: 16  SpO2: 92 %  Physical Exam  Vitals and nursing note reviewed.   Constitutional:       General: She is not in acute distress.     Appearance: She is not diaphoretic.      Comments: Adult female, lying in bed, incontinent.  Severely intoxicated, mumbling, not cooperating with questions.  Turning to face the wall away from examiner.  Covering face with blanket.   HENT:      Head: Atraumatic.      Mouth/Throat:      Mouth: Mucous membranes are moist.      Pharynx: Oropharynx is clear. No oropharyngeal exudate.   Eyes:      General: No scleral icterus.     Pupils: Pupils are equal, round, and reactive to light.   Cardiovascular:      Rate and Rhythm: Normal rate.      Pulses: Normal pulses.       Heart sounds: Normal heart sounds. No murmur heard.  Pulmonary:      Effort: No respiratory distress.      Breath sounds: Normal breath sounds.   Abdominal:      General: Bowel sounds are normal.      Palpations: Abdomen is soft.      Tenderness: There is no abdominal tenderness.      Comments: Obese, soft, nontender   Musculoskeletal:         General: No tenderness.   Skin:     General: Skin is warm.      Findings: No rash.   Neurological:      Mental Status: She is alert.   Psychiatric:      Comments: Intoxicated. Slurred speech. Tearful affect at times. +SI with plan to overdose. Does not cooperate with  answering many questions, turning away from examiner and facing the wall.          ED Course, Procedures, & Data      Procedures       Mental Health Risk Assessment        PSS-3      Date and Time Over the past 2 weeks have you felt down, depressed, or hopeless? Over the past 2 weeks have you had thoughts of killing yourself? Have you ever attempted to kill yourself? When did this last happen? User   02/09/24 2200 yes yes yes more than 6 months ago IT          C-SSRS ([place])      Date and Time Q1 Wished to be Dead (Past Month) Q2 Suicidal Thoughts (Past Month) Q3 Suicidal Thought Method Q4 Suicidal Intent without Specific Plan Q5 Suicide Intent with Specific Plan Q6 Suicide Behavior (Lifetime) Within the Past 3 Months? RETIRED: Level of Risk per Screen Screening Not Complete User   02/09/24 2200 yes yes no yes no -- -- -- -- IT                Suicide assessment completed by mental health (D.E.C., LCSW, etc.)       Results for orders placed or performed during the hospital encounter of 02/09/24   CBC with platelets and differential     Status: Abnormal   Result Value Ref Range    WBC Count 7.5 4.0 - 11.0 10e3/uL    RBC Count 4.82 3.80 - 5.20 10e6/uL    Hemoglobin 13.9 11.7 - 15.7 g/dL    Hematocrit 41.3 35.0 - 47.0 %    MCV 86 78 - 100 fL    MCH 28.8 26.5 - 33.0 pg    MCHC 33.7 31.5 - 36.5 g/dL    RDW 15.8 (H)  10.0 - 15.0 %    Platelet Count 395 150 - 450 10e3/uL    % Neutrophils 70 %    % Lymphocytes 24 %    % Monocytes 4 %    % Eosinophils 1 %    % Basophils 1 %    % Immature Granulocytes 0 %    NRBCs per 100 WBC 0 <1 /100    Absolute Neutrophils 5.3 1.6 - 8.3 10e3/uL    Absolute Lymphocytes 1.8 0.8 - 5.3 10e3/uL    Absolute Monocytes 0.3 0.0 - 1.3 10e3/uL    Absolute Eosinophils 0.1 0.0 - 0.7 10e3/uL    Absolute Basophils 0.0 0.0 - 0.2 10e3/uL    Absolute Immature Granulocytes 0.0 <=0.4 10e3/uL    Absolute NRBCs 0.0 10e3/uL   Alcohol level blood     Status: Abnormal   Result Value Ref Range    Alcohol ethyl 0.22 (H) <=0.01 g/dL   CBC with platelets differential     Status: Abnormal    Narrative    The following orders were created for panel order CBC with platelets differential.  Procedure                               Abnormality         Status                     ---------                               -----------         ------                     CBC with platelets and d...[[id number]]  Abnormal            Final result                 Please view results for these tests on the individual orders.     Medications - No data to display  Labs Ordered and Resulted from Time of ED Arrival to Time of ED Departure   CBC WITH PLATELETS AND DIFFERENTIAL - Abnormal       Result Value    WBC Count 7.5      RBC Count 4.82      Hemoglobin 13.9      Hematocrit 41.3      MCV 86      MCH 28.8      MCHC 33.7      RDW 15.8 (*)     Platelet Count 395      % Neutrophils 70      % Lymphocytes 24      % Monocytes 4      % Eosinophils 1      % Basophils 1      % Immature Granulocytes 0      NRBCs per 100 WBC 0      Absolute Neutrophils 5.3      Absolute Lymphocytes 1.8      Absolute Monocytes 0.3      Absolute Eosinophils 0.1      Absolute Basophils 0.0      Absolute Immature Granulocytes 0.0      Absolute NRBCs 0.0     ETHYL ALCOHOL LEVEL - Abnormal    Alcohol ethyl 0.22 (*)      No orders to display          Critical care was not  performed.     Medical Decision Making  The patient's presentation was of high complexity (a chronic illness severe exacerbation, progression, or side effect of treatment).    The patient's evaluation involved:  review of external note(s) from 2 sources (see separate area of note for details)  review of 1 test result(s) ordered prior to this encounter (see separate area of note for details)  ordering and/or review of 2 test(s) in this encounter (see separate area of note for details)    The patient's management necessitated further care after sign-out to Dr Vyas (see their note for further management).    Assessment & Plan    Patient presents for the above complaints.  On my evaluation she is severely intoxicated, very difficult to evaluate.  Record review shows that she has had multiple ER visits across the [facility], for both alcohol related issues and mental health evaluations.  We did order a breathalyzer here in the emergency department, but she was unable to cooperate with this, so serum alcohol level was done and was 0.22.  Rally pack ordered.  Patient will require a period of sobering up before she can be appropriately evaluated from a mental health standpoint.      Patient will be signed out to Dr. Vyas on overnight shift to follow up on DEC assessment after she yang up.    I have reviewed the nursing notes. I have reviewed the findings, diagnosis, plan and need for follow up with the patient.    New Prescriptions    No medications on file       Final diagnoses:   Alcoholic intoxication with complication (H24)   Suicidal ideation       Rochelle Stafford MD  [facility] EMERGENCY DEPARTMENT  2/9/2024     Rochelle Stafford MD  02/10/24 0031

## 2024-02-25 ENCOUNTER — HEALTH MAINTENANCE LETTER (OUTPATIENT)
Age: 54
End: 2024-02-25

## 2024-07-14 ENCOUNTER — HEALTH MAINTENANCE LETTER (OUTPATIENT)
Age: 54
End: 2024-07-14

## 2024-12-01 ENCOUNTER — HEALTH MAINTENANCE LETTER (OUTPATIENT)
Age: 54
End: 2024-12-01

## 2025-03-15 ENCOUNTER — HEALTH MAINTENANCE LETTER (OUTPATIENT)
Age: 55
End: 2025-03-15

## 2025-06-28 ENCOUNTER — HEALTH MAINTENANCE LETTER (OUTPATIENT)
Age: 55
End: 2025-06-28

## (undated) RX ORDER — ONDANSETRON 2 MG/ML
INJECTION INTRAMUSCULAR; INTRAVENOUS
Status: DISPENSED
Start: 2022-10-04

## (undated) RX ORDER — ACETAMINOPHEN 325 MG/1
TABLET ORAL
Status: DISPENSED
Start: 2022-10-04